# Patient Record
Sex: FEMALE | Race: BLACK OR AFRICAN AMERICAN | Employment: OTHER | URBAN - NONMETROPOLITAN AREA
[De-identification: names, ages, dates, MRNs, and addresses within clinical notes are randomized per-mention and may not be internally consistent; named-entity substitution may affect disease eponyms.]

---

## 2017-01-03 ENCOUNTER — TELEPHONE (OUTPATIENT)
Dept: FAMILY MEDICINE CLINIC | Age: 57
End: 2017-01-03

## 2017-01-03 DIAGNOSIS — M79.2 NERVE PAIN: Primary | ICD-10-CM

## 2017-01-03 RX ORDER — GABAPENTIN 100 MG/1
100 CAPSULE ORAL 2 TIMES DAILY
Qty: 60 CAPSULE | Refills: 3 | Status: SHIPPED | OUTPATIENT
Start: 2017-01-03 | End: 2017-07-06 | Stop reason: SDUPTHER

## 2017-04-03 RX ORDER — LORATADINE 10 MG/1
TABLET ORAL
Qty: 30 TABLET | Refills: 5 | Status: SHIPPED | OUTPATIENT
Start: 2017-04-03 | End: 2017-10-18 | Stop reason: SDUPTHER

## 2017-04-03 RX ORDER — LISINOPRIL AND HYDROCHLOROTHIAZIDE 12.5; 1 MG/1; MG/1
TABLET ORAL
Qty: 30 TABLET | Refills: 5 | Status: SHIPPED | OUTPATIENT
Start: 2017-04-03 | End: 2017-10-16 | Stop reason: SDUPTHER

## 2017-06-30 PROBLEM — R07.9 CHEST PAIN: Status: ACTIVE | Noted: 2017-06-30

## 2017-06-30 PROBLEM — J20.9 ACUTE BRONCHITIS: Status: ACTIVE | Noted: 2017-06-30

## 2017-06-30 PROBLEM — R05.9 COUGHING: Status: ACTIVE | Noted: 2017-06-30

## 2017-07-03 ENCOUNTER — TELEPHONE (OUTPATIENT)
Dept: FAMILY MEDICINE CLINIC | Age: 57
End: 2017-07-03

## 2017-07-06 ENCOUNTER — OFFICE VISIT (OUTPATIENT)
Dept: FAMILY MEDICINE CLINIC | Age: 57
End: 2017-07-06

## 2017-07-06 ENCOUNTER — TELEPHONE (OUTPATIENT)
Dept: FAMILY MEDICINE CLINIC | Age: 57
End: 2017-07-06

## 2017-07-06 VITALS
SYSTOLIC BLOOD PRESSURE: 126 MMHG | TEMPERATURE: 98 F | BODY MASS INDEX: 21.41 KG/M2 | DIASTOLIC BLOOD PRESSURE: 72 MMHG | WEIGHT: 113.4 LBS | HEART RATE: 80 BPM | HEIGHT: 61 IN | RESPIRATION RATE: 16 BRPM

## 2017-07-06 DIAGNOSIS — Z23 IMMUNIZATION DUE: ICD-10-CM

## 2017-07-06 DIAGNOSIS — J30.0 VASOMOTOR RHINITIS: ICD-10-CM

## 2017-07-06 DIAGNOSIS — Z13.9 SCREENING: ICD-10-CM

## 2017-07-06 DIAGNOSIS — I10 ESSENTIAL HYPERTENSION: ICD-10-CM

## 2017-07-06 DIAGNOSIS — M65.30 TRIGGER FINGER OF LEFT HAND, UNSPECIFIED FINGER: ICD-10-CM

## 2017-07-06 DIAGNOSIS — F10.10 ALCOHOL ABUSE: ICD-10-CM

## 2017-07-06 DIAGNOSIS — Z09 HOSPITAL DISCHARGE FOLLOW-UP: Primary | ICD-10-CM

## 2017-07-06 DIAGNOSIS — E78.5 HYPERLIPIDEMIA, UNSPECIFIED HYPERLIPIDEMIA TYPE: ICD-10-CM

## 2017-07-06 DIAGNOSIS — R05.9 COUGHING: ICD-10-CM

## 2017-07-06 PROCEDURE — 90471 IMMUNIZATION ADMIN: CPT | Performed by: NURSE PRACTITIONER

## 2017-07-06 PROCEDURE — 90732 PPSV23 VACC 2 YRS+ SUBQ/IM: CPT | Performed by: NURSE PRACTITIONER

## 2017-07-06 PROCEDURE — 99496 TRANSJ CARE MGMT HIGH F2F 7D: CPT | Performed by: NURSE PRACTITIONER

## 2017-07-06 RX ORDER — GABAPENTIN 100 MG/1
100 CAPSULE ORAL 2 TIMES DAILY
Qty: 60 CAPSULE | Refills: 3 | Status: SHIPPED | OUTPATIENT
Start: 2017-07-06 | End: 2017-11-15

## 2017-07-06 RX ORDER — AMLODIPINE BESYLATE 5 MG/1
5 TABLET ORAL DAILY
Qty: 30 TABLET | Refills: 6 | Status: SHIPPED | OUTPATIENT
Start: 2017-07-06 | End: 2017-11-15 | Stop reason: SDUPTHER

## 2017-07-06 RX ORDER — FLUTICASONE PROPIONATE 50 MCG
SPRAY, SUSPENSION (ML) NASAL
Qty: 1 BOTTLE | Refills: 5 | Status: SHIPPED | OUTPATIENT
Start: 2017-07-06 | End: 2017-11-15 | Stop reason: SDUPTHER

## 2017-07-06 RX ORDER — SIMVASTATIN 40 MG
40 TABLET ORAL NIGHTLY
Qty: 30 TABLET | Refills: 6 | Status: SHIPPED | OUTPATIENT
Start: 2017-07-06 | End: 2017-11-15 | Stop reason: SDUPTHER

## 2017-07-06 ASSESSMENT — ENCOUNTER SYMPTOMS
ABDOMINAL PAIN: 0
ABDOMINAL DISTENTION: 0
RESPIRATORY NEGATIVE: 1

## 2017-07-10 ENCOUNTER — TELEPHONE (OUTPATIENT)
Dept: FAMILY MEDICINE CLINIC | Age: 57
End: 2017-07-10

## 2017-07-12 ENCOUNTER — TELEPHONE (OUTPATIENT)
Dept: FAMILY MEDICINE CLINIC | Age: 57
End: 2017-07-12

## 2017-07-12 RX ORDER — THIAMINE MONONITRATE (VIT B1) 100 MG
100 TABLET ORAL DAILY
Qty: 30 TABLET | Refills: 11 | Status: SHIPPED | OUTPATIENT
Start: 2017-07-12 | End: 2018-06-10 | Stop reason: SDUPTHER

## 2017-07-14 ENCOUNTER — TELEPHONE (OUTPATIENT)
Dept: FAMILY MEDICINE CLINIC | Age: 57
End: 2017-07-14

## 2017-07-31 ENCOUNTER — TELEPHONE (OUTPATIENT)
Dept: FAMILY MEDICINE CLINIC | Age: 57
End: 2017-07-31

## 2017-10-16 RX ORDER — LISINOPRIL AND HYDROCHLOROTHIAZIDE 12.5; 1 MG/1; MG/1
TABLET ORAL
Qty: 30 TABLET | Refills: 5 | Status: SHIPPED | OUTPATIENT
Start: 2017-10-16 | End: 2018-04-10 | Stop reason: SDUPTHER

## 2017-10-16 NOTE — TELEPHONE ENCOUNTER
Danielle Pederson called requesting a refill on the following medications:  Requested Prescriptions     Pending Prescriptions Disp Refills    lisinopril-hydrochlorothiazide (PRINZIDE;ZESTORETIC) 10-12.5 MG per tablet 30 tablet 5     Sig: TAKE 1 [address] verified:  .pv      Date of last visit: 7/6/17  Date of next visit (if applicable): 33/31/1248        Date of last fill and quantity (to be completed by clinical staff)  Pharmacy name: [address]

## 2017-10-18 ENCOUNTER — OFFICE VISIT (OUTPATIENT)
Dept: FAMILY MEDICINE CLINIC | Age: 57
End: 2017-10-18
Payer: COMMERCIAL

## 2017-10-18 VITALS
WEIGHT: 114 LBS | SYSTOLIC BLOOD PRESSURE: 138 MMHG | BODY MASS INDEX: 20.98 KG/M2 | HEIGHT: 62 IN | HEART RATE: 68 BPM | RESPIRATION RATE: 12 BRPM | DIASTOLIC BLOOD PRESSURE: 76 MMHG | TEMPERATURE: 98.4 F

## 2017-10-18 DIAGNOSIS — Z01.419 PAP SMEAR, AS PART OF ROUTINE GYNECOLOGICAL EXAMINATION: Primary | ICD-10-CM

## 2017-10-18 DIAGNOSIS — K21.9 GASTROESOPHAGEAL REFLUX DISEASE WITHOUT ESOPHAGITIS: ICD-10-CM

## 2017-10-18 DIAGNOSIS — F34.1 DYSTHYMIA: ICD-10-CM

## 2017-10-18 DIAGNOSIS — J30.1 ALLERGIC RHINITIS DUE TO POLLEN, UNSPECIFIED CHRONICITY, UNSPECIFIED SEASONALITY: ICD-10-CM

## 2017-10-18 PROBLEM — R07.9 CHEST PAIN: Status: RESOLVED | Noted: 2017-06-30 | Resolved: 2017-10-18

## 2017-10-18 PROBLEM — J20.9 ACUTE BRONCHITIS: Status: RESOLVED | Noted: 2017-06-30 | Resolved: 2017-10-18

## 2017-10-18 PROCEDURE — 99396 PREV VISIT EST AGE 40-64: CPT | Performed by: NURSE PRACTITIONER

## 2017-10-18 RX ORDER — LORATADINE 10 MG/1
TABLET ORAL
Qty: 30 TABLET | Refills: 5 | Status: SHIPPED | OUTPATIENT
Start: 2017-10-18 | End: 2018-06-10 | Stop reason: SDUPTHER

## 2017-10-18 RX ORDER — PANTOPRAZOLE SODIUM 40 MG/1
TABLET, DELAYED RELEASE ORAL
Qty: 30 TABLET | Refills: 5 | Status: SHIPPED | OUTPATIENT
Start: 2017-10-18 | End: 2018-04-10 | Stop reason: SDUPTHER

## 2017-10-18 RX ORDER — SERTRALINE HYDROCHLORIDE 25 MG/1
25 TABLET, FILM COATED ORAL DAILY
Qty: 30 TABLET | Refills: 3 | Status: SHIPPED | OUTPATIENT
Start: 2017-10-18 | End: 2018-02-10 | Stop reason: SDUPTHER

## 2017-10-18 NOTE — PROGRESS NOTES
medications for this visit. Allergies: Pork-derived products   No LMP recorded. Patient is postmenopausal.    ROS:  Feeling well. No dyspnea or chest pain on exertion. No abdominal pain, change in bowel habits, black or bloody stools. No urinary tract symptoms. GYN ROS: no breast pain or new or enlarging lumps on self exam, no vaginal bleeding, no discharge or pelvic pain, no hot flashes. No neurological complaints. OBJECTIVE:   The patient appears well, alert, oriented x 3, in no distress. /76   Pulse 68   Temp 98.4 °F (36.9 °C) (Oral)   Resp 12   Ht 5' 2.21\" (1.58 m)   Wt 114 lb (51.7 kg)   BMI 20.71 kg/m²   ENT normal.  Neck supple. No adenopathy or thyromegaly. TIMI. Lungs are clear, good air entry, no wheezes, rhonchi or rales. S1 and S2 normal, no murmurs, regular rate and rhythm. Abdomen soft without tenderness, guarding, mass or organomegaly. Extremities show no edema, normal peripheral pulses. Neurological is normal, no focal findings. BREAST EXAM: breasts appear normal, no suspicious masses, no skin or nipple changes or axillary nodes, no axillary lymphadenopathy, risk and benefit of breast self-exam was discussed    PELVIC EXAM: normal external genitalia, vulva, vagina, cervix, uterus and adnexa, VULVA: normal appearing vulva with no masses, tenderness or lesions, VAGINA: normal appearing vagina with normal color and discharge, no lesions, PELVIC FLOOR EXAM: no cystocele, rectocele or prolapse noted, CERVIX: normal appearing cervix without discharge or lesions, no discharge noted, cervical motion tenderness absent, UTERUS: uterus is normal size, shape, consistency and nontender, ADNEXA: normal adnexa in size, nontender and no masses, no masses, RECTAL: normal rectal, no masses, guaiac negative stool obtained, normal sphincter tone, no rectocele, PAP: Pap smear done today, thin-prep method    ASSESSMENT:   well woman  Encounter Diagnoses   Name Primary?     Pap smear, as part of routine gynecological examination Yes    Gastroesophageal reflux disease without esophagitis     Allergic rhinitis due to pollen, unspecified chronicity, unspecified seasonality     Dysthymia      PLAN:   mammogram  pap smear  counseled on breast self exam, mammography screening, menopause, osteoporosis and adequate intake of calcium and vitamin D  return annually or prn  Orders Placed This Encounter   Procedures    PAP SMEAR   Start zoloft and monitor symptoms.

## 2017-10-18 NOTE — PATIENT INSTRUCTIONS
and stroke risk. At least every 4 to 6 years, you should have your risk for heart attack and stroke assessed. Your doctor uses factors such as your age, blood pressure, cholesterol, and whether you smoke or have diabetes to show what your risk for a heart attack or stroke is over the next 10 years. When should you call for help? Watch closely for changes in your health, and be sure to contact your doctor if you have any problems or symptoms that concern you. Where can you learn more? Go to https://Local.com.[employer]. org and sign in to your [employer] account. Enter F975 in the [employer]Tel.com box to learn more about \"Well Visit, Women 50 to 72: Care Instructions. \"     If you do not have an account, please click on the \"Sign Up Now\" link. Current as of: July 19, 2016  Content Version: 11.3  © 8564-4082 [employer]. Care instructions adapted under license by [facility] ([place] [facility]). If you have questions about a medical condition or this instruction, always ask your healthcare professional. [address] any warranty or liability for your use of this information.

## 2017-10-24 LAB — GYNECOLOGY CYTOLOGY REPORT: NORMAL

## 2017-10-26 ENCOUNTER — TELEPHONE (OUTPATIENT)
Dept: FAMILY MEDICINE CLINIC | Age: 57
End: 2017-10-26

## 2017-11-15 ENCOUNTER — OFFICE VISIT (OUTPATIENT)
Dept: FAMILY MEDICINE CLINIC | Age: 57
End: 2017-11-15
Payer: COMMERCIAL

## 2017-11-15 VITALS
HEIGHT: 62 IN | DIASTOLIC BLOOD PRESSURE: 76 MMHG | RESPIRATION RATE: 16 BRPM | TEMPERATURE: 97.9 F | SYSTOLIC BLOOD PRESSURE: 124 MMHG | HEART RATE: 88 BPM | WEIGHT: 117.2 LBS | BODY MASS INDEX: 21.57 KG/M2

## 2017-11-15 DIAGNOSIS — E78.5 HYPERLIPIDEMIA, UNSPECIFIED HYPERLIPIDEMIA TYPE: ICD-10-CM

## 2017-11-15 DIAGNOSIS — J30.0 CHRONIC VASOMOTOR RHINITIS: ICD-10-CM

## 2017-11-15 DIAGNOSIS — K21.9 GASTROESOPHAGEAL REFLUX DISEASE WITHOUT ESOPHAGITIS: ICD-10-CM

## 2017-11-15 DIAGNOSIS — Z86.010 HISTORY OF COLON POLYPS: ICD-10-CM

## 2017-11-15 DIAGNOSIS — I10 ESSENTIAL HYPERTENSION: Primary | ICD-10-CM

## 2017-11-15 DIAGNOSIS — F41.9 ANXIETY: ICD-10-CM

## 2017-11-15 PROCEDURE — 3017F COLORECTAL CA SCREEN DOC REV: CPT | Performed by: NURSE PRACTITIONER

## 2017-11-15 PROCEDURE — 4004F PT TOBACCO SCREEN RCVD TLK: CPT | Performed by: NURSE PRACTITIONER

## 2017-11-15 PROCEDURE — 3014F SCREEN MAMMO DOC REV: CPT | Performed by: NURSE PRACTITIONER

## 2017-11-15 PROCEDURE — G8482 FLU IMMUNIZE ORDER/ADMIN: HCPCS | Performed by: NURSE PRACTITIONER

## 2017-11-15 PROCEDURE — G8427 DOCREV CUR MEDS BY ELIG CLIN: HCPCS | Performed by: NURSE PRACTITIONER

## 2017-11-15 PROCEDURE — 99214 OFFICE O/P EST MOD 30 MIN: CPT | Performed by: NURSE PRACTITIONER

## 2017-11-15 PROCEDURE — G8420 CALC BMI NORM PARAMETERS: HCPCS | Performed by: NURSE PRACTITIONER

## 2017-11-15 RX ORDER — SIMVASTATIN 40 MG
40 TABLET ORAL NIGHTLY
Qty: 30 TABLET | Refills: 6 | Status: SHIPPED | OUTPATIENT
Start: 2017-11-15 | End: 2018-11-02 | Stop reason: SDUPTHER

## 2017-11-15 RX ORDER — FLUTICASONE PROPIONATE 50 MCG
SPRAY, SUSPENSION (ML) NASAL
Qty: 1 BOTTLE | Refills: 5 | Status: SHIPPED | OUTPATIENT
Start: 2017-11-15 | End: 2018-09-08 | Stop reason: SDUPTHER

## 2017-11-15 RX ORDER — AMLODIPINE BESYLATE 5 MG/1
5 TABLET ORAL DAILY
Qty: 30 TABLET | Refills: 6 | Status: SHIPPED | OUTPATIENT
Start: 2017-11-15 | End: 2018-12-05 | Stop reason: SDUPTHER

## 2017-11-15 RX ORDER — ASPIRIN 81 MG/1
81 TABLET ORAL DAILY
Qty: 30 TABLET | Refills: 11 | Status: SHIPPED | OUTPATIENT
Start: 2017-11-15 | End: 2018-11-05 | Stop reason: SDUPTHER

## 2017-11-15 ASSESSMENT — ENCOUNTER SYMPTOMS
GASTROINTESTINAL NEGATIVE: 1
RESPIRATORY NEGATIVE: 1
SINUS PAIN: 0
SINUS PRESSURE: 0
RHINORRHEA: 1

## 2017-11-15 ASSESSMENT — PATIENT HEALTH QUESTIONNAIRE - PHQ9
SUM OF ALL RESPONSES TO PHQ QUESTIONS 1-9: 0
SUM OF ALL RESPONSES TO PHQ9 QUESTIONS 1 & 2: 0
2. FEELING DOWN, DEPRESSED OR HOPELESS: 0
1. LITTLE INTEREST OR PLEASURE IN DOING THINGS: 0

## 2018-01-30 ENCOUNTER — HOSPITAL ENCOUNTER (EMERGENCY)
Age: 58
Discharge: HOME | End: 2018-01-30
Attending: FAMILY MEDICINE
Payer: COMMERCIAL

## 2018-01-30 VITALS
DIASTOLIC BLOOD PRESSURE: 73 MMHG | SYSTOLIC BLOOD PRESSURE: 94 MMHG | TEMPERATURE: 98.6 F | HEIGHT: 61 IN | OXYGEN SATURATION: 95 % | BODY MASS INDEX: 21.52 KG/M2 | WEIGHT: 114 LBS | RESPIRATION RATE: 18 BRPM | HEART RATE: 82 BPM

## 2018-01-30 DIAGNOSIS — R21 RASH AND OTHER NONSPECIFIC SKIN ERUPTION: Primary | ICD-10-CM

## 2018-01-30 PROCEDURE — 99282 EMERGENCY DEPT VISIT SF MDM: CPT

## 2018-01-30 RX ORDER — DIAPER,BRIEF,INFANT-TODD,DISP
EACH MISCELLANEOUS
Qty: 1 TUBE | Refills: 0 | Status: SHIPPED | OUTPATIENT
Start: 2018-01-30 | End: 2018-01-30

## 2018-01-30 RX ORDER — DIAPER,BRIEF,INFANT-TODD,DISP
EACH MISCELLANEOUS
Qty: 1 TUBE | Refills: 0 | Status: SHIPPED | OUTPATIENT
Start: 2018-01-30 | End: 2018-02-06

## 2018-01-30 ASSESSMENT — ENCOUNTER SYMPTOMS
NAUSEA: 0
ABDOMINAL DISTENTION: 0
DIARRHEA: 0
EYE DISCHARGE: 0
SHORTNESS OF BREATH: 0
EYE REDNESS: 0
PHOTOPHOBIA: 0
COUGH: 0
RHINORRHEA: 0
STRIDOR: 0
FACIAL SWELLING: 0
COLOR CHANGE: 0
VOMITING: 0

## 2018-02-10 DIAGNOSIS — F34.1 DYSTHYMIA: ICD-10-CM

## 2018-02-12 RX ORDER — SERTRALINE HYDROCHLORIDE 25 MG/1
25 TABLET, FILM COATED ORAL DAILY
Qty: 30 TABLET | Refills: 3 | Status: SHIPPED | OUTPATIENT
Start: 2018-02-12 | End: 2018-06-11 | Stop reason: SDUPTHER

## 2018-04-10 DIAGNOSIS — K21.9 GASTROESOPHAGEAL REFLUX DISEASE WITHOUT ESOPHAGITIS: ICD-10-CM

## 2018-04-10 RX ORDER — LISINOPRIL AND HYDROCHLOROTHIAZIDE 12.5; 1 MG/1; MG/1
TABLET ORAL
Qty: 30 TABLET | Refills: 5 | Status: SHIPPED | OUTPATIENT
Start: 2018-04-10 | End: 2018-10-08 | Stop reason: SDUPTHER

## 2018-04-10 RX ORDER — PANTOPRAZOLE SODIUM 40 MG/1
TABLET, DELAYED RELEASE ORAL
Qty: 30 TABLET | Refills: 5 | Status: SHIPPED | OUTPATIENT
Start: 2018-04-10 | End: 2018-10-08 | Stop reason: SDUPTHER

## 2018-05-07 ENCOUNTER — HOSPITAL ENCOUNTER (EMERGENCY)
Age: 58
Discharge: HOME | End: 2018-05-07
Payer: COMMERCIAL

## 2018-05-07 ENCOUNTER — APPOINTMENT (OUTPATIENT)
Dept: GENERAL RADIOLOGY | Age: 58
End: 2018-05-07
Payer: COMMERCIAL

## 2018-05-07 VITALS
OXYGEN SATURATION: 97 % | HEIGHT: 61 IN | WEIGHT: 114 LBS | SYSTOLIC BLOOD PRESSURE: 133 MMHG | BODY MASS INDEX: 21.52 KG/M2 | RESPIRATION RATE: 18 BRPM | DIASTOLIC BLOOD PRESSURE: 89 MMHG | HEART RATE: 83 BPM | TEMPERATURE: 98.1 F

## 2018-05-07 DIAGNOSIS — N30.00 ACUTE CYSTITIS WITHOUT HEMATURIA: Primary | ICD-10-CM

## 2018-05-07 DIAGNOSIS — J06.9 VIRAL URI WITH COUGH: ICD-10-CM

## 2018-05-07 LAB
BACTERIA: ABNORMAL /HPF
BILIRUBIN URINE: NEGATIVE
BLOOD, URINE: NEGATIVE
CASTS 2: ABNORMAL /LPF
CASTS UA: ABNORMAL /LPF
CHARACTER, URINE: ABNORMAL
COLOR: YELLOW
CRYSTALS, UA: ABNORMAL
EPITHELIAL CELLS, UA: ABNORMAL /HPF
FLU A ANTIGEN: NEGATIVE
FLU B ANTIGEN: NEGATIVE
GLUCOSE URINE: NEGATIVE MG/DL
KETONES, URINE: NEGATIVE
LEUKOCYTE ESTERASE, URINE: ABNORMAL
MISCELLANEOUS 2: ABNORMAL
NITRITE, URINE: NEGATIVE
PH UA: 5.5
PROTEIN UA: NEGATIVE
RBC URINE: ABNORMAL /HPF
RENAL EPITHELIAL, UA: ABNORMAL
SPECIFIC GRAVITY, URINE: 1.01 (ref 1–1.03)
UROBILINOGEN, URINE: 1 EU/DL
WBC UA: ABNORMAL /HPF
YEAST: ABNORMAL

## 2018-05-07 PROCEDURE — 6360000002 HC RX W HCPCS: Performed by: STUDENT IN AN ORGANIZED HEALTH CARE EDUCATION/TRAINING PROGRAM

## 2018-05-07 PROCEDURE — 71046 X-RAY EXAM CHEST 2 VIEWS: CPT

## 2018-05-07 PROCEDURE — 87086 URINE CULTURE/COLONY COUNT: CPT

## 2018-05-07 PROCEDURE — 6370000000 HC RX 637 (ALT 250 FOR IP): Performed by: STUDENT IN AN ORGANIZED HEALTH CARE EDUCATION/TRAINING PROGRAM

## 2018-05-07 PROCEDURE — 87804 INFLUENZA ASSAY W/OPTIC: CPT

## 2018-05-07 PROCEDURE — 99283 EMERGENCY DEPT VISIT LOW MDM: CPT

## 2018-05-07 PROCEDURE — 94640 AIRWAY INHALATION TREATMENT: CPT

## 2018-05-07 PROCEDURE — 81001 URINALYSIS AUTO W/SCOPE: CPT

## 2018-05-07 RX ORDER — IPRATROPIUM BROMIDE AND ALBUTEROL SULFATE 2.5; .5 MG/3ML; MG/3ML
1 SOLUTION RESPIRATORY (INHALATION) ONCE
Status: COMPLETED | OUTPATIENT
Start: 2018-05-07 | End: 2018-05-07

## 2018-05-07 RX ORDER — ONDANSETRON 4 MG/1
4 TABLET, ORALLY DISINTEGRATING ORAL ONCE
Status: COMPLETED | OUTPATIENT
Start: 2018-05-07 | End: 2018-05-07

## 2018-05-07 RX ORDER — ONDANSETRON 4 MG/1
4 TABLET, ORALLY DISINTEGRATING ORAL EVERY 8 HOURS PRN
Qty: 20 TABLET | Refills: 0 | Status: SHIPPED | OUTPATIENT
Start: 2018-05-07 | End: 2019-01-15 | Stop reason: ALTCHOICE

## 2018-05-07 RX ORDER — ALBUTEROL SULFATE 90 UG/1
2 AEROSOL, METERED RESPIRATORY (INHALATION) EVERY 4 HOURS PRN
Qty: 1 INHALER | Refills: 0 | Status: SHIPPED | OUTPATIENT
Start: 2018-05-07

## 2018-05-07 RX ORDER — NITROFURANTOIN 25; 75 MG/1; MG/1
100 CAPSULE ORAL 2 TIMES DAILY
Qty: 14 CAPSULE | Refills: 0 | Status: SHIPPED | OUTPATIENT
Start: 2018-05-07 | End: 2018-05-14

## 2018-05-07 RX ADMIN — IPRATROPIUM BROMIDE AND ALBUTEROL SULFATE 1 AMPULE: .5; 3 SOLUTION RESPIRATORY (INHALATION) at 16:26

## 2018-05-07 RX ADMIN — ONDANSETRON 4 MG: 4 TABLET, ORALLY DISINTEGRATING ORAL at 16:25

## 2018-05-07 ASSESSMENT — ENCOUNTER SYMPTOMS
EYE PAIN: 0
ABDOMINAL PAIN: 0
EYE DISCHARGE: 0
COUGH: 1
SHORTNESS OF BREATH: 0
VOMITING: 0
DIARRHEA: 1
BACK PAIN: 1
WHEEZING: 0
SORE THROAT: 0
NAUSEA: 1
RHINORRHEA: 1

## 2018-05-08 LAB
ORGANISM: ABNORMAL
URINE CULTURE REFLEX: ABNORMAL

## 2018-05-09 ENCOUNTER — CARE COORDINATION (OUTPATIENT)
Dept: CASE MANAGEMENT | Age: 58
End: 2018-05-09

## 2018-05-15 ENCOUNTER — OFFICE VISIT (OUTPATIENT)
Dept: FAMILY MEDICINE CLINIC | Age: 58
End: 2018-05-15
Payer: COMMERCIAL

## 2018-05-15 VITALS
SYSTOLIC BLOOD PRESSURE: 138 MMHG | WEIGHT: 112.8 LBS | TEMPERATURE: 98.7 F | HEIGHT: 61 IN | DIASTOLIC BLOOD PRESSURE: 82 MMHG | HEART RATE: 80 BPM | RESPIRATION RATE: 16 BRPM | BODY MASS INDEX: 21.3 KG/M2

## 2018-05-15 DIAGNOSIS — Z72.0 TOBACCO ABUSE: ICD-10-CM

## 2018-05-15 DIAGNOSIS — J40 BRONCHITIS: Primary | ICD-10-CM

## 2018-05-15 DIAGNOSIS — R09.89 HYPERINFLATION OF LUNGS: ICD-10-CM

## 2018-05-15 PROCEDURE — 99213 OFFICE O/P EST LOW 20 MIN: CPT | Performed by: NURSE PRACTITIONER

## 2018-05-15 PROCEDURE — 3017F COLORECTAL CA SCREEN DOC REV: CPT | Performed by: NURSE PRACTITIONER

## 2018-05-15 PROCEDURE — G8427 DOCREV CUR MEDS BY ELIG CLIN: HCPCS | Performed by: NURSE PRACTITIONER

## 2018-05-15 PROCEDURE — G8420 CALC BMI NORM PARAMETERS: HCPCS | Performed by: NURSE PRACTITIONER

## 2018-05-15 PROCEDURE — 4004F PT TOBACCO SCREEN RCVD TLK: CPT | Performed by: NURSE PRACTITIONER

## 2018-05-15 RX ORDER — GUAIFENESIN 600 MG/1
600 TABLET, EXTENDED RELEASE ORAL 2 TIMES DAILY
Qty: 30 TABLET | Refills: 0 | Status: SHIPPED | OUTPATIENT
Start: 2018-05-15 | End: 2019-01-15 | Stop reason: ALTCHOICE

## 2018-05-15 ASSESSMENT — ENCOUNTER SYMPTOMS
CHOKING: 0
CHEST TIGHTNESS: 0
COUGH: 1
SINUS PRESSURE: 0
SHORTNESS OF BREATH: 0
SINUS PAIN: 0
RHINORRHEA: 0
WHEEZING: 0

## 2018-05-30 ENCOUNTER — HOSPITAL ENCOUNTER (OUTPATIENT)
Dept: PULMONOLOGY | Age: 58
Discharge: HOME | End: 2018-05-30
Payer: COMMERCIAL

## 2018-05-30 ENCOUNTER — TELEPHONE (OUTPATIENT)
Dept: FAMILY MEDICINE CLINIC | Age: 58
End: 2018-05-30

## 2018-05-30 DIAGNOSIS — R09.89 HYPERINFLATION OF LUNGS: ICD-10-CM

## 2018-05-30 DIAGNOSIS — J40 BRONCHITIS: ICD-10-CM

## 2018-05-30 DIAGNOSIS — Z72.0 TOBACCO ABUSE: ICD-10-CM

## 2018-05-30 PROCEDURE — 94726 PLETHYSMOGRAPHY LUNG VOLUMES: CPT

## 2018-05-30 PROCEDURE — 94729 DIFFUSING CAPACITY: CPT

## 2018-05-30 PROCEDURE — 94060 EVALUATION OF WHEEZING: CPT

## 2018-06-04 ENCOUNTER — TELEPHONE (OUTPATIENT)
Dept: FAMILY MEDICINE CLINIC | Age: 58
End: 2018-06-04

## 2018-06-06 ENCOUNTER — TELEPHONE (OUTPATIENT)
Dept: FAMILY MEDICINE CLINIC | Age: 58
End: 2018-06-06

## 2018-06-10 DIAGNOSIS — J30.1 ALLERGIC RHINITIS DUE TO POLLEN, UNSPECIFIED CHRONICITY, UNSPECIFIED SEASONALITY: ICD-10-CM

## 2018-06-11 DIAGNOSIS — F34.1 DYSTHYMIA: ICD-10-CM

## 2018-06-11 RX ORDER — DEXTRIN 3 G/3.8 G
100 POWDER (GRAM) ORAL DAILY
Qty: 30 TABLET | Refills: 10 | Status: SHIPPED | OUTPATIENT
Start: 2018-06-11 | End: 2019-06-12 | Stop reason: SDUPTHER

## 2018-06-11 RX ORDER — LORATADINE 10 MG/1
TABLET ORAL
Qty: 30 TABLET | Refills: 5 | Status: SHIPPED | OUTPATIENT
Start: 2018-06-11 | End: 2018-12-04 | Stop reason: SDUPTHER

## 2018-06-11 RX ORDER — SERTRALINE HYDROCHLORIDE 25 MG/1
25 TABLET, FILM COATED ORAL DAILY
Qty: 30 TABLET | Refills: 3 | Status: SHIPPED | OUTPATIENT
Start: 2018-06-11 | End: 2018-10-06 | Stop reason: SDUPTHER

## 2018-07-02 ENCOUNTER — TELEPHONE (OUTPATIENT)
Dept: FAMILY MEDICINE CLINIC | Age: 58
End: 2018-07-02

## 2018-07-02 NOTE — TELEPHONE ENCOUNTER
Informed pt that we received an approval on 6/11/18 for the inhaler. She said she was going to contact her pharmacy about this, and if there are any issues to contact our office. Pt verbalized understanding.

## 2018-07-02 NOTE — TELEPHONE ENCOUNTER
7/2/18  Patient checking status on PA for her inhaler for bronchitis (didn't know name), as hasn't heard anything back. States [facility] [address] told her yesterday that they had something they could use, but was check with [facility]. She is asking for \"something\" to use. Please advise.   Thanks/blm  Dolv: 5/15/18      (Can be reached anytime)

## 2018-09-08 DIAGNOSIS — J30.0 CHRONIC VASOMOTOR RHINITIS: ICD-10-CM

## 2018-09-10 RX ORDER — FLUTICASONE PROPIONATE 50 MCG
SPRAY, SUSPENSION (ML) NASAL
Qty: 1 BOTTLE | Refills: 5 | Status: SHIPPED | OUTPATIENT
Start: 2018-09-10 | End: 2019-03-06 | Stop reason: SDUPTHER

## 2018-10-06 DIAGNOSIS — F34.1 DYSTHYMIA: ICD-10-CM

## 2018-10-08 DIAGNOSIS — K21.9 GASTROESOPHAGEAL REFLUX DISEASE WITHOUT ESOPHAGITIS: ICD-10-CM

## 2018-10-08 RX ORDER — SERTRALINE HYDROCHLORIDE 25 MG/1
25 TABLET, FILM COATED ORAL DAILY
Qty: 30 TABLET | Refills: 3 | Status: SHIPPED | OUTPATIENT
Start: 2018-10-08 | End: 2019-01-15 | Stop reason: SDUPTHER

## 2018-10-08 RX ORDER — LISINOPRIL AND HYDROCHLOROTHIAZIDE 12.5; 1 MG/1; MG/1
TABLET ORAL
Qty: 30 TABLET | Refills: 5 | Status: SHIPPED | OUTPATIENT
Start: 2018-10-08 | End: 2019-04-15 | Stop reason: SDUPTHER

## 2018-10-08 RX ORDER — PANTOPRAZOLE SODIUM 40 MG/1
TABLET, DELAYED RELEASE ORAL
Qty: 30 TABLET | Refills: 5 | Status: SHIPPED | OUTPATIENT
Start: 2018-10-08 | End: 2020-01-10 | Stop reason: SDUPTHER

## 2018-11-02 DIAGNOSIS — E78.5 HYPERLIPIDEMIA, UNSPECIFIED HYPERLIPIDEMIA TYPE: ICD-10-CM

## 2018-11-02 RX ORDER — SIMVASTATIN 40 MG
40 TABLET ORAL NIGHTLY
Qty: 30 TABLET | Refills: 6 | Status: SHIPPED | OUTPATIENT
Start: 2018-11-02 | End: 2019-06-12 | Stop reason: SDUPTHER

## 2018-11-05 DIAGNOSIS — I10 ESSENTIAL HYPERTENSION: ICD-10-CM

## 2018-11-05 RX ORDER — ASPIRIN 81 MG/1
81 TABLET ORAL DAILY
Qty: 30 TABLET | Refills: 11 | Status: SHIPPED | OUTPATIENT
Start: 2018-11-05 | End: 2019-01-15 | Stop reason: ALTCHOICE

## 2018-12-04 DIAGNOSIS — J30.1 ALLERGIC RHINITIS DUE TO POLLEN: ICD-10-CM

## 2018-12-04 RX ORDER — LORATADINE 10 MG/1
TABLET ORAL
Qty: 30 TABLET | Refills: 5 | Status: SHIPPED | OUTPATIENT
Start: 2018-12-04 | End: 2019-07-20 | Stop reason: SDUPTHER

## 2018-12-05 DIAGNOSIS — I10 ESSENTIAL HYPERTENSION: ICD-10-CM

## 2018-12-05 RX ORDER — AMLODIPINE BESYLATE 5 MG/1
5 TABLET ORAL DAILY
Qty: 30 TABLET | Refills: 4 | Status: SHIPPED | OUTPATIENT
Start: 2018-12-05 | End: 2019-05-10 | Stop reason: SDUPTHER

## 2018-12-17 ENCOUNTER — APPOINTMENT (OUTPATIENT)
Dept: GENERAL RADIOLOGY | Age: 58
End: 2018-12-17
Payer: COMMERCIAL

## 2018-12-17 ENCOUNTER — HOSPITAL ENCOUNTER (EMERGENCY)
Age: 58
Discharge: HOME | End: 2018-12-17
Payer: COMMERCIAL

## 2018-12-17 VITALS
OXYGEN SATURATION: 98 % | SYSTOLIC BLOOD PRESSURE: 112 MMHG | HEART RATE: 76 BPM | TEMPERATURE: 97.8 F | RESPIRATION RATE: 17 BRPM | DIASTOLIC BLOOD PRESSURE: 87 MMHG

## 2018-12-17 DIAGNOSIS — R07.9 CHEST PAIN, UNSPECIFIED TYPE: Primary | ICD-10-CM

## 2018-12-17 DIAGNOSIS — F43.21 GRIEF: ICD-10-CM

## 2018-12-17 DIAGNOSIS — F10.920 ACUTE ALCOHOLIC INTOXICATION WITHOUT COMPLICATION (HCC): ICD-10-CM

## 2018-12-17 LAB
ANION GAP SERPL CALCULATED.3IONS-SCNC: 18 MEQ/L (ref 8–16)
BASOPHILS # BLD: 0.4 %
BASOPHILS ABSOLUTE: 0 THOU/MM3 (ref 0–0.1)
BUN BLDV-MCNC: 9 MG/DL (ref 7–22)
CALCIUM SERPL-MCNC: 8.8 MG/DL (ref 8.5–10.5)
CHLORIDE BLD-SCNC: 100 MEQ/L (ref 98–111)
CO2: 23 MEQ/L (ref 23–33)
CREAT SERPL-MCNC: 1.1 MG/DL (ref 0.4–1.2)
D-DIMER QUANTITATIVE: < 215 NG/ML FEU (ref 0–500)
EKG ATRIAL RATE: 89 BPM
EKG P AXIS: 72 DEGREES
EKG P-R INTERVAL: 144 MS
EKG Q-T INTERVAL: 352 MS
EKG QRS DURATION: 78 MS
EKG QTC CALCULATION (BAZETT): 428 MS
EKG R AXIS: 57 DEGREES
EKG T AXIS: 40 DEGREES
EKG VENTRICULAR RATE: 89 BPM
EOSINOPHIL # BLD: 1.3 %
EOSINOPHILS ABSOLUTE: 0.1 THOU/MM3 (ref 0–0.4)
ERYTHROCYTE [DISTWIDTH] IN BLOOD BY AUTOMATED COUNT: 12.6 % (ref 11.5–14.5)
ERYTHROCYTE [DISTWIDTH] IN BLOOD BY AUTOMATED COUNT: 44.4 FL (ref 35–45)
ETHYL ALCOHOL, SERUM: 0.22 %
GFR SERPL CREATININE-BSD FRML MDRD: 62 ML/MIN/1.73M2
GLUCOSE BLD-MCNC: 118 MG/DL (ref 70–108)
HCT VFR BLD CALC: 41 % (ref 37–47)
HEMOGLOBIN: 14.2 GM/DL (ref 12–16)
IMMATURE GRANS (ABS): 0.01 THOU/MM3 (ref 0–0.07)
IMMATURE GRANULOCYTES: 0.1 %
LIPASE: 20.6 U/L (ref 5.6–51.3)
LYMPHOCYTES # BLD: 30.1 %
LYMPHOCYTES ABSOLUTE: 2.1 THOU/MM3 (ref 1–4.8)
MCH RBC QN AUTO: 33.7 PG (ref 26–33)
MCHC RBC AUTO-ENTMCNC: 34.6 GM/DL (ref 32.2–35.5)
MCV RBC AUTO: 97.4 FL (ref 81–99)
MONOCYTES # BLD: 10.7 %
MONOCYTES ABSOLUTE: 0.7 THOU/MM3 (ref 0.4–1.3)
NUCLEATED RED BLOOD CELLS: 0 /100 WBC
OSMOLALITY CALCULATION: 281 MOSMOL/KG (ref 275–300)
PLATELET # BLD: 307 THOU/MM3 (ref 130–400)
PMV BLD AUTO: 9.6 FL (ref 9.4–12.4)
POTASSIUM SERPL-SCNC: 3.9 MEQ/L (ref 3.5–5.2)
RBC # BLD: 4.21 MILL/MM3 (ref 4.2–5.4)
SEG NEUTROPHILS: 57.4 %
SEGMENTED NEUTROPHILS ABSOLUTE COUNT: 4 THOU/MM3 (ref 1.8–7.7)
SODIUM BLD-SCNC: 141 MEQ/L (ref 135–145)
TROPONIN T: < 0.01 NG/ML
TROPONIN T: < 0.01 NG/ML
TSH SERPL DL<=0.05 MIU/L-ACNC: 1.03 UIU/ML (ref 0.4–4.2)
WBC # BLD: 6.9 THOU/MM3 (ref 4.8–10.8)

## 2018-12-17 PROCEDURE — 83690 ASSAY OF LIPASE: CPT

## 2018-12-17 PROCEDURE — 84484 ASSAY OF TROPONIN QUANT: CPT

## 2018-12-17 PROCEDURE — 71046 X-RAY EXAM CHEST 2 VIEWS: CPT

## 2018-12-17 PROCEDURE — 99285 EMERGENCY DEPT VISIT HI MDM: CPT

## 2018-12-17 PROCEDURE — 85025 COMPLETE CBC W/AUTO DIFF WBC: CPT

## 2018-12-17 PROCEDURE — 93010 ELECTROCARDIOGRAM REPORT: CPT | Performed by: INTERNAL MEDICINE

## 2018-12-17 PROCEDURE — 93005 ELECTROCARDIOGRAM TRACING: CPT | Performed by: NURSE PRACTITIONER

## 2018-12-17 PROCEDURE — 80048 BASIC METABOLIC PNL TOTAL CA: CPT

## 2018-12-17 PROCEDURE — 85379 FIBRIN DEGRADATION QUANT: CPT

## 2018-12-17 PROCEDURE — G0480 DRUG TEST DEF 1-7 CLASSES: HCPCS

## 2018-12-17 PROCEDURE — 36415 COLL VENOUS BLD VENIPUNCTURE: CPT

## 2018-12-17 PROCEDURE — 84443 ASSAY THYROID STIM HORMONE: CPT

## 2018-12-17 ASSESSMENT — PAIN DESCRIPTION - PAIN TYPE: TYPE: ACUTE PAIN

## 2018-12-17 ASSESSMENT — ENCOUNTER SYMPTOMS
COUGH: 0
PHOTOPHOBIA: 0
DIARRHEA: 0
EYE REDNESS: 0
SORE THROAT: 0
CHEST TIGHTNESS: 0
VOICE CHANGE: 0
SHORTNESS OF BREATH: 1
COLOR CHANGE: 0
RHINORRHEA: 0
CONSTIPATION: 0
ABDOMINAL DISTENTION: 0
ABDOMINAL PAIN: 0
VOMITING: 0
BACK PAIN: 0
WHEEZING: 0
SINUS PRESSURE: 0
NAUSEA: 0
BLOOD IN STOOL: 0

## 2018-12-17 ASSESSMENT — PAIN DESCRIPTION - LOCATION: LOCATION: CHEST

## 2018-12-17 ASSESSMENT — PAIN SCALES - GENERAL: PAINLEVEL_OUTOF10: 8

## 2018-12-17 NOTE — ED TRIAGE NOTES
Patient presents from her grandsons  with chest pain that started while she was there at the . EKG completed showing NSR.

## 2018-12-17 NOTE — ED PROVIDER NOTES
[facility] Emergency Department    CHIEF COMPLAINT       Chief Complaint   Patient presents with    Chest Pain    Anxiety       Nurses Notes reviewed and I agree except as noted in the HPI. HISTORY OF PRESENT ILLNESS    Marge Sheehan is a 61 y.o. female who presents to the ED for evaluation of chest pain and anxiety. Patient reports a history of hypertension, hyperlipidemia, anxiety, DVT, and a clotting disorder for which she is currently not on any blood thinner. She states that her grandson  6 days ago and that she has been experiencing increased stress and anxiety in regards to her loss. She reports that she has been experiencing right-sided to mid-sternal chest pain for the past 2 days which worsened today while she was at her grandson's . She rates her current pain at an 8/10 in severity and describes the pain as a continuous pressure. Patient reports that she has also been experiencing fatigue, shortness of breath, and intermittent dizziness. She states that she smokes and that she has a family history of cardiac disease. She denies experiencing any nausea, emesis, diarrhea, abdominal pain, back pain, fever, chills, urinary symptoms, or URI symptoms. Patient denies further complaints at initial encounter. Pain description:  Onset: 2 days ago, worse today  Location: Chest pain  Duration: Continuous  Character: Pressure  Aggravating factors: None  Radiation: None  Severity: 8/10    Experienced previously: No    HPI was provided by the patient. REVIEW OF SYSTEMS     Review of Systems   Constitutional: Positive for fatigue. Negative for appetite change, chills, diaphoresis, fever and unexpected weight change. HENT: Negative for congestion, hearing loss, postnasal drip, rhinorrhea, sinus pressure, sore throat and voice change. Eyes: Negative for photophobia, redness and visual disturbance. Respiratory: Positive for shortness of breath.  Negative for cough, chest tightness and wheezing. Cardiovascular: Positive for chest pain. Negative for palpitations. Gastrointestinal: Negative for abdominal distention, abdominal pain, blood in stool, constipation, diarrhea, nausea and vomiting. Endocrine: Negative for cold intolerance, heat intolerance, polydipsia, polyphagia and polyuria. Genitourinary: Negative for difficulty urinating, dysuria, flank pain, frequency and vaginal pain. Musculoskeletal: Negative for arthralgias, back pain, gait problem, joint swelling, neck pain and neck stiffness. Skin: Negative for color change and rash. Allergic/Immunologic: Negative for immunocompromised state. Neurological: Positive for dizziness. Negative for tremors, weakness, light-headedness, numbness and headaches. Hematological: Does not bruise/bleed easily. Psychiatric/Behavioral: Negative for behavioral problems, confusion, decreased concentration, hallucinations, self-injury and suicidal ideas. The patient is nervous/anxious. PAST MEDICAL HISTORY     Past Medical History:   Diagnosis Date    Anxiety     Asthma     [facility]     Blood clotting disorder ([facility] [address] [facility] 2014    was on coumadin until 2014    Breast cyst 7-8 yrs ago    rt    Depression     Hyperlipidemia     Hypertension     Schizophrenia ([facility] [address]        SURGICALHISTORY      has a past surgical history that includes  section; Breast surgery (10 yrs); Tubal ligation; fasciotomy (Left, 2013); other surgical history (3/19/2013); and Elbow surgery (Left, 2013).     CURRENT MEDICATIONS       Discharge Medication List as of 2018  7:18 PM      CONTINUE these medications which have NOT CHANGED    Details   amLODIPine (NORVASC) 5 MG tablet TAKE 1 TABLET BY MOUTH DAILY, Disp-30 tablet, R-4Normal      loratadine (CLARITIN) 10 MG tablet TAKE 1 TABLET BY MOUTH EVERY DAY, Disp-30 tablet, R-5Normal      aspirin 81 MG EC tablet TAKE 1 TABLET BY MOUTH DAILY, Disp-30 tablet, R-11Normal simvastatin (ZOCOR) 40 MG tablet TAKE 1 TABLET BY MOUTH NIGHTLY, Disp-30 tablet, R-6Normal      sertraline (ZOLOFT) 25 MG tablet TAKE 1 TABLET BY MOUTH DAILY, Disp-30 tablet, R-3Normal      pantoprazole (PROTONIX) 40 MG tablet TAKE 1 TABLET BY MOUTH DAILY, Disp-30 tablet, R-5Normal      lisinopril-hydrochlorothiazide (PRINZIDE;ZESTORETIC) 10-12.5 MG per tablet TAKE 1 TABLET BY MOUTH EVERY DAY, Disp-30 tablet, R-5Normal      fluticasone (FLONASE) 50 MCG/ACT nasal spray USE 1 SPRAY INTO EACH NOSTRIL EVERY DAY AS DIRECTED, Disp-1 Bottle, R-5Normal      [facility] B-1 100 MG tablet TAKE 1 TABLET BY MOUTH DAILY, Disp-30 tablet, R-10Normal      formoterol (FORADIL AEROLIZER) 12 MCG capsule for inhaler Inhale 1 capsule into the lungs 2 times daily, Disp-60 capsule, R-3Normal      guaiFENesin (MUCINEX) 600 MG extended release tablet Take 1 tablet by mouth 2 times daily, Disp-30 tablet, R-0Normal      !! albuterol sulfate HFA (PROVENTIL HFA) 108 (90 Base) MCG/ACT inhaler Inhale 2 puffs into the lungs every 4 hours as needed for Wheezing or Shortness of Breath (Space out to every 6 hours as symptoms improve) Space out to every 6 hours as symptoms improve., Disp-1 Inhaler, R-0Print      ondansetron (ZOFRAN ODT) 4 MG disintegrating tablet Take 1 tablet by mouth every 8 hours as needed for Nausea, Disp-20 tablet, R-0Print      Calcium-Vitamin D-Vitamin K (CALCIUM + D) 500-1000-40 MG-UNT-MCG CHEW Take 1 tablet by mouth daily, Disp-30 tablet, R-11Normal      !! albuterol sulfate HFA (PROAIR HFA) 108 (90 BASE) MCG/ACT inhaler Inhale 2 puffs into the lungs every 4 hours as needed for Wheezing, Disp-1 Inhaler, R-0PLEASE REFILL      calcium carbonate 648 MG TABS TAKE 1 TABLET BY MOUTH TWICE A DAY, Disp-60 tablet, R-5       !! - Potential duplicate medications found. Please discuss with provider. ALLERGIES     is allergic to pork-derived products. FAMILY HISTORY     indicated that her mother is .  She indicated that her 12/17/18 1648 12/17/18 1759   BP: 99/65 (!) 108/94 112/87   Pulse: 84 80 76   Resp: 17 18 17   Temp: 97.8 °F (36.6 °C)     TempSrc: Oral     SpO2: 98% 97% 98%       MDM    Patient was seen and evaluated in the emergency department, patient appeared to be no acute distress. Vital signs were reviewed, no significant findings were noted. Physical exam completed, no significant findings were noted. No calf tenderness no and her thigh tenderness noted, no signs of DVT noted. Lungs are clear to auscultation. Patient's act appeared to be slightly intoxicated. Labs are obtained, confirming this. Negative troponin initially andDenies rash 2 hours. D-dimer was negative. I discussed my findings my plan of care the patient she is amenable discharge. It appears she had a cardiac workup in 2017 which was negative for any acute abnormality. She is going to be referred to the chest pain clinic. She was amenable to this plan of care. While here in the emergency department she maintained stable course is appropriate for discharge. Case my attending Dr. Tabby Gloria, agrees with plan of care. Medications - No data to display    Patient was seenindependently by myself. The patient's final impression and disposition and plan was determined by myself. CRITICAL CARE:   None    CONSULTS:  Dr. Tabby Gloria (ED Physician)    PROCEDURES:  None    FINAL IMPRESSION     1. Chest pain, unspecified type    2. Grief    3.  Acute alcoholic intoxication without complication [facility])          DISPOSITION/PLAN   Patient discharged in stable condition    PATIENT REFERREDTO:  [id number] [facility] [id number] [phone]  Go to   12/18/18 at 1030 am      DISCHARGE MEDICATIONS:  Discharge Medication List as of 12/17/2018  7:18 PM          (Please note that portions of this note were completed with a voice recognition program.  Efforts were made to edit the dictations but occasionally words are mis-transcribed.)    Scribe:  Carolin Fu 12/17/18 3:17 PM Scribing for and in the presence of Vito Chopra CNP. Signed by: Bailee Vo, 12/17/18 8:53 PM    Provider:  I personally performed the services described in the documentation,reviewed and edited the documentation which was dictated to the scribe in my presence, and it accurately records my words and actions.     Vito Chopra CNP 12/17/18 8:53 PM    Xavi Chopra, MELLY - UNIQUE       [employer], MELLY - CNP  12/17/18 2053

## 2018-12-18 ENCOUNTER — OFFICE VISIT (OUTPATIENT)
Dept: CARDIOLOGY CLINIC | Age: 58
End: 2018-12-18
Payer: COMMERCIAL

## 2018-12-18 VITALS
DIASTOLIC BLOOD PRESSURE: 72 MMHG | SYSTOLIC BLOOD PRESSURE: 113 MMHG | BODY MASS INDEX: 21.49 KG/M2 | HEART RATE: 72 BPM | WEIGHT: 113.8 LBS | HEIGHT: 61 IN

## 2018-12-18 DIAGNOSIS — R06.02 SOB (SHORTNESS OF BREATH) ON EXERTION: ICD-10-CM

## 2018-12-18 DIAGNOSIS — I10 ESSENTIAL HYPERTENSION: ICD-10-CM

## 2018-12-18 DIAGNOSIS — E78.5 HYPERLIPIDEMIA, UNSPECIFIED HYPERLIPIDEMIA TYPE: ICD-10-CM

## 2018-12-18 DIAGNOSIS — K21.9 GASTROESOPHAGEAL REFLUX DISEASE WITHOUT ESOPHAGITIS: ICD-10-CM

## 2018-12-18 DIAGNOSIS — Z72.0 TOBACCO ABUSE: ICD-10-CM

## 2018-12-18 DIAGNOSIS — R07.89 CHEST PAIN, ATYPICAL: Primary | ICD-10-CM

## 2018-12-18 DIAGNOSIS — R00.2 INTERMITTENT PALPITATIONS: ICD-10-CM

## 2018-12-18 PROCEDURE — G8484 FLU IMMUNIZE NO ADMIN: HCPCS | Performed by: INTERNAL MEDICINE

## 2018-12-18 PROCEDURE — G8420 CALC BMI NORM PARAMETERS: HCPCS | Performed by: INTERNAL MEDICINE

## 2018-12-18 PROCEDURE — G8427 DOCREV CUR MEDS BY ELIG CLIN: HCPCS | Performed by: INTERNAL MEDICINE

## 2018-12-18 PROCEDURE — 3017F COLORECTAL CA SCREEN DOC REV: CPT | Performed by: INTERNAL MEDICINE

## 2018-12-18 PROCEDURE — 4004F PT TOBACCO SCREEN RCVD TLK: CPT | Performed by: INTERNAL MEDICINE

## 2018-12-18 PROCEDURE — 99204 OFFICE O/P NEW MOD 45 MIN: CPT | Performed by: INTERNAL MEDICINE

## 2018-12-18 RX ORDER — NAPROXEN 500 MG/1
500 TABLET ORAL 2 TIMES DAILY WITH MEALS
COMMUNITY
End: 2018-12-18 | Stop reason: SDUPTHER

## 2018-12-18 NOTE — PROGRESS NOTES
Chief Complaint   Patient presents with    Establish Cardiologist    Follow-Up from [facility]   NP here - CP/ER    Pt denharshad BIRD  Lost her grandson few days back and had cp for 2 weeks    Chest Pain   Patient complains of chest pain. For the last 2 weeks . Retrosternal, sudden in onset, Symptoms have been unchanged since that time. The patient's pain is intermittent. The patient describes the pain as pressure and does not radiate. Patient rates pain as a 6/10 in intensity. Associated symptoms are: palpitations. Aggravating factors are: none. Alleviating factors are: none. CP last 10 min  freq daily    Intermittent palpitations    Worse with being upset    Pt did not bring a medication list. Pt verbally stated nothing has changed.        Patient Active Problem List   Diagnosis    Alcohol abuse    Hypertension    Hyperlipidemia    GERD (gastroesophageal reflux disease)    Adenomatous colon polyp    Chest pain, atypical    Intermittent palpitations    SOB (shortness of breath) on exertion    Tobacco abuse       Past Surgical History:   Procedure Laterality Date    BREAST SURGERY  10 yrs    lump right     SECTION      x 1    ELBOW SURGERY Left 2013    Deep hardware removal with ulnar nerve decompression    FASCIOTOMY Left 2013    L arm    OTHER SURGICAL HISTORY  3/19/2013    LEFT ELBOW ORIF WITH WOUND CLOSURE    TUBAL LIGATION         Allergies   Allergen Reactions    Pork-Derived Products Nausea Only        Family History   Problem Relation Age of Onset    Cancer Mother     Diabetes Father     Cancer Maternal Uncle         bone    Cancer Maternal Grandmother         uterine    Diabetes Maternal Grandfather     Cancer Brother         Colon Cancer        Social History     Social History    Marital status: Single     Spouse name: N/A    Number of children: 3    Years of education: 11     Occupational History    SSI      Social History Main Topics    Smoking status: Current Some Day Smoker     Packs/day: 0.00     Years: 35.00     Types: Cigarettes    Smokeless tobacco: Never Used    Alcohol use 3.0 oz/week     5 Glasses of wine per week      Comment: wine occasional    Drug use: No    Sexual activity: Yes     Partners: Male     Other Topics Concern    Not on file     Social History Narrative    No narrative on file       Current Outpatient Prescriptions   Medication Sig Dispense Refill    amLODIPine (NORVASC) 5 MG tablet TAKE 1 TABLET BY MOUTH DAILY 30 tablet 4    loratadine (CLARITIN) 10 MG tablet TAKE 1 TABLET BY MOUTH EVERY DAY 30 tablet 5    aspirin 81 MG EC tablet TAKE 1 TABLET BY MOUTH DAILY 30 tablet 11    simvastatin (ZOCOR) 40 MG tablet TAKE 1 TABLET BY MOUTH NIGHTLY 30 tablet 6    sertraline (ZOLOFT) 25 MG tablet TAKE 1 TABLET BY MOUTH DAILY 30 tablet 3    pantoprazole (PROTONIX) 40 MG tablet TAKE 1 TABLET BY MOUTH DAILY 30 tablet 5    lisinopril-hydrochlorothiazide (PRINZIDE;ZESTORETIC) 10-12.5 MG per tablet TAKE 1 TABLET BY MOUTH EVERY DAY 30 tablet 5    fluticasone (FLONASE) 50 MCG/ACT nasal spray USE 1 SPRAY INTO EACH NOSTRIL EVERY DAY AS DIRECTED 1 Bottle 5    [facility] B-1 100 MG tablet TAKE 1 TABLET BY MOUTH DAILY 30 tablet 10    formoterol (FORADIL AEROLIZER) 12 MCG capsule for inhaler Inhale 1 capsule into the lungs 2 times daily 60 capsule 3    guaiFENesin (MUCINEX) 600 MG extended release tablet Take 1 tablet by mouth 2 times daily 30 tablet 0    albuterol sulfate HFA (PROVENTIL HFA) 108 (90 Base) MCG/ACT inhaler Inhale 2 puffs into the lungs every 4 hours as needed for Wheezing or Shortness of Breath (Space out to every 6 hours as symptoms improve) Space out to every 6 hours as symptoms improve.  1 Inhaler 0    ondansetron (ZOFRAN ODT) 4 MG disintegrating tablet Take 1 tablet by mouth every 8 hours as needed for Nausea 20 tablet 0    Calcium-Vitamin D-Vitamin K (CALCIUM + D) 500-1000-40 MG-UNT-MCG CHEW Take 1 tablet by mouth daily 30 tablet 11    albuterol sulfate HFA (PROAIR HFA) 108 (90 BASE) MCG/ACT inhaler Inhale 2 puffs into the lungs every 4 hours as needed for Wheezing 1 Inhaler 0    calcium carbonate 648 MG TABS TAKE 1 TABLET BY MOUTH TWICE A DAY 60 tablet 5     No current facility-administered medications for this visit. Review of Systems -     General ROS: negative  Psychological ROS: negative  Hematological and Lymphatic ROS: No history of blood clots or bleeding disorder. Respiratory ROS: no cough,  or wheezing, the rest see HPI  Cardiovascular ROS: See HPI  Gastrointestinal ROS: negative  Genito-Urinary ROS: no dysuria, trouble voiding, or hematuria  Musculoskeletal ROS: negative  Neurological ROS: no TIA or stroke symptoms  Dermatological ROS: negative      Blood pressure 82/60, pulse 88, height 5' 1\" (1.549 m), weight 113 lb 12.8 oz (51.6 kg), not currently breastfeeding. Physical Examination:    General appearance - alert, well appearing, and in no distress  HEENT- Pink conjunctiva  , Non-icteri sclera,PERRLA  Mental status - alert, oriented to person, place, and time  Neck - supple, no significant adenopathy, no JVD, or carotid bruits  Chest - clear to auscultation, no wheezes, rales or rhonchi, symmetric air entry  Heart - normal rate, regular rhythm, normal S1, S2, no murmurs, rubs, clicks or gallops  Abdomen - soft, nontender, nondistended, no masses or organomegaly  PAPA- no CVA or flank tenderness, no suprapubic tenderness  Neurological - alert, oriented, normal speech, no focal findings or movement disorder noted  Musculoskeletal/limbs - no joint tenderness, deformity or swelling   - peripheral pulses normal, no pedal edema, no clubbing or cyanosis  Skin - normal coloration and turgor, no rashes, no suspicious skin lesions noted  Psych- appropriate mood and affect    Lab  No results for input(s): CKTOTAL, CKMB, CKMBINDEX, TROPONINI in the last 72 hours.   CBC:   Lab Results   Component Value Date    WBC 6.9 12/17/2018    RBC 4.21 12/17/2018    RBC 4.16 06/01/2012    HGB 14.2 12/17/2018    HCT 41.0 12/17/2018    MCV 97.4 12/17/2018    MCH 33.7 12/17/2018    MCHC 34.6 12/17/2018    RDW 13.9 06/30/2017     12/17/2018    MPV 9.6 12/17/2018     BMP:    Lab Results   Component Value Date     12/17/2018    K 3.9 12/17/2018     12/17/2018    CO2 23 12/17/2018    BUN 9 12/17/2018    LABALBU 3.5 06/30/2017    LABALBU 4.4 06/01/2012    CREATININE 1.1 12/17/2018    CALCIUM 8.8 12/17/2018    LABGLOM 62 12/17/2018    GLUCOSE 118 12/17/2018    GLUCOSE 107 06/01/2012     Hepatic Function Panel:    Lab Results   Component Value Date    ALKPHOS 115 06/30/2017    ALT 15 06/30/2017    AST 34 06/30/2017    PROT 7.2 06/30/2017    BILITOT 0.3 06/30/2017    BILIDIR <0.2 06/27/2014    LABALBU 3.5 06/30/2017    LABALBU 4.4 06/01/2012     Magnesium:    Lab Results   Component Value Date    MG 2.0 06/30/2017     Warfarin PT/INR:  No components found for: PTPATWAR, PTINRWAR  HgBA1c:  No results found for: LABA1C  FLP:    Lab Results   Component Value Date    TRIG 297 07/10/2017    HDL 82 07/10/2017    LDLCALC 53 07/10/2017     TSH:    Lab Results   Component Value Date    TSH 1.030 12/17/2018       EKG 12/18/18  NSR normal EKG    Assessment   Diagnosis Orders   1. Chest pain, atypical     2. Intermittent palpitations     3. SOB (shortness of breath) on exertion     4. Essential hypertension     5. Hyperlipidemia, unspecified hyperlipidemia type     6. Tobacco abuse     7. Gastroesophageal reflux disease without esophagitis           Plan   Continue the current treatment and with constant vigilance to changes in symptoms and also any potential side effects. Return for care or seek medical attention immediately if symptoms got worse and/or develop new symptoms.     Chest pain  Severe chest wall tenderness  Sob  Trop neg x2  Naproxen 500 bid for 5 day  Echo  lexiscan nuc  Asa 81    ER record reviewed    D/w the pat the plan of

## 2018-12-19 RX ORDER — NAPROXEN 500 MG/1
500 TABLET ORAL 2 TIMES DAILY WITH MEALS
Qty: 10 TABLET | Refills: 0 | OUTPATIENT
Start: 2018-12-19 | End: 2019-01-15 | Stop reason: ALTCHOICE

## 2019-01-02 ENCOUNTER — TELEPHONE (OUTPATIENT)
Dept: CARDIOLOGY CLINIC | Age: 59
End: 2019-01-02

## 2019-01-02 DIAGNOSIS — E78.5 HYPERLIPIDEMIA, UNSPECIFIED HYPERLIPIDEMIA TYPE: ICD-10-CM

## 2019-01-02 DIAGNOSIS — I10 ESSENTIAL HYPERTENSION: Primary | ICD-10-CM

## 2019-01-02 DIAGNOSIS — R07.89 CHEST PAIN, ATYPICAL: ICD-10-CM

## 2019-01-02 DIAGNOSIS — R00.2 INTERMITTENT PALPITATIONS: ICD-10-CM

## 2019-01-15 ENCOUNTER — TELEPHONE (OUTPATIENT)
Dept: FAMILY MEDICINE CLINIC | Age: 59
End: 2019-01-15

## 2019-01-15 ENCOUNTER — OFFICE VISIT (OUTPATIENT)
Dept: FAMILY MEDICINE CLINIC | Age: 59
End: 2019-01-15
Payer: COMMERCIAL

## 2019-01-15 VITALS
RESPIRATION RATE: 16 BRPM | TEMPERATURE: 98.5 F | SYSTOLIC BLOOD PRESSURE: 134 MMHG | HEIGHT: 61 IN | HEART RATE: 83 BPM | BODY MASS INDEX: 21.52 KG/M2 | DIASTOLIC BLOOD PRESSURE: 82 MMHG | WEIGHT: 114 LBS

## 2019-01-15 DIAGNOSIS — B96.81 H PYLORI ULCER: Primary | ICD-10-CM

## 2019-01-15 DIAGNOSIS — F43.21 GRIEF REACTION: ICD-10-CM

## 2019-01-15 DIAGNOSIS — Z12.39 SCREENING FOR BREAST CANCER: ICD-10-CM

## 2019-01-15 DIAGNOSIS — R19.5 LOOSE STOOLS: ICD-10-CM

## 2019-01-15 DIAGNOSIS — F34.1 DYSTHYMIA: ICD-10-CM

## 2019-01-15 DIAGNOSIS — K25.3 ACUTE GASTRIC ULCER WITHOUT HEMORRHAGE OR PERFORATION: ICD-10-CM

## 2019-01-15 DIAGNOSIS — R10.84 GENERALIZED ABDOMINAL PAIN: Primary | ICD-10-CM

## 2019-01-15 DIAGNOSIS — K27.9 H PYLORI ULCER: Primary | ICD-10-CM

## 2019-01-15 DIAGNOSIS — F10.10 ALCOHOL ABUSE: ICD-10-CM

## 2019-01-15 DIAGNOSIS — R74.01 TRANSAMINITIS: ICD-10-CM

## 2019-01-15 PROCEDURE — G8484 FLU IMMUNIZE NO ADMIN: HCPCS | Performed by: NURSE PRACTITIONER

## 2019-01-15 PROCEDURE — 3017F COLORECTAL CA SCREEN DOC REV: CPT | Performed by: NURSE PRACTITIONER

## 2019-01-15 PROCEDURE — 99215 OFFICE O/P EST HI 40 MIN: CPT | Performed by: NURSE PRACTITIONER

## 2019-01-15 PROCEDURE — 4004F PT TOBACCO SCREEN RCVD TLK: CPT | Performed by: NURSE PRACTITIONER

## 2019-01-15 PROCEDURE — G8420 CALC BMI NORM PARAMETERS: HCPCS | Performed by: NURSE PRACTITIONER

## 2019-01-15 PROCEDURE — G8427 DOCREV CUR MEDS BY ELIG CLIN: HCPCS | Performed by: NURSE PRACTITIONER

## 2019-01-15 RX ORDER — AMOXICILLIN 500 MG/1
1 TABLET, FILM COATED ORAL 2 TIMES DAILY
Qty: 28 TABLET | Refills: 0 | Status: SHIPPED | OUTPATIENT
Start: 2019-01-15 | End: 2019-01-29

## 2019-01-15 RX ORDER — OMEPRAZOLE 20 MG/1
20 CAPSULE, DELAYED RELEASE ORAL 2 TIMES DAILY
Qty: 28 CAPSULE | Refills: 3 | Status: SHIPPED | OUTPATIENT
Start: 2019-01-15 | End: 2020-01-10 | Stop reason: SDUPTHER

## 2019-01-15 RX ORDER — CLARITHROMYCIN 500 MG/1
500 TABLET, COATED ORAL 2 TIMES DAILY
Qty: 28 TABLET | Refills: 0 | Status: SHIPPED | OUTPATIENT
Start: 2019-01-15 | End: 2019-01-29

## 2019-01-15 ASSESSMENT — ENCOUNTER SYMPTOMS
ABDOMINAL PAIN: 1
CONSTIPATION: 0
DIARRHEA: 1
NAUSEA: 0
RESPIRATORY NEGATIVE: 1
ABDOMINAL DISTENTION: 0

## 2019-01-15 ASSESSMENT — PATIENT HEALTH QUESTIONNAIRE - PHQ9: DEPRESSION UNABLE TO ASSESS: URGENT/EMERGENT SITUATION

## 2019-01-22 ENCOUNTER — TELEPHONE (OUTPATIENT)
Dept: FAMILY MEDICINE CLINIC | Age: 59
End: 2019-01-22

## 2019-01-22 LAB
ALBUMIN SERPL-MCNC: 4.1 GM/DL (ref 3.5–5)
ALP BLD-CCNC: 110 IU/L (ref 39–118)
ALT SERPL-CCNC: 29 IU/L (ref 10–40)
AST SERPL-CCNC: 53 IU/L (ref 15–41)
BILIRUB SERPL-MCNC: 0.7 MG/DL (ref 0.2–1)
BILIRUBIN DIRECT: 0.2 MG/DL (ref 0.1–0.2)
TOTAL PROTEIN: 8 G/DL (ref 6.2–8)

## 2019-01-23 ENCOUNTER — HOSPITAL ENCOUNTER (OUTPATIENT)
Dept: WOMENS IMAGING | Age: 59
Discharge: HOME | End: 2019-01-23
Payer: COMMERCIAL

## 2019-01-23 DIAGNOSIS — Z12.39 SCREENING FOR BREAST CANCER: ICD-10-CM

## 2019-01-23 PROCEDURE — 77063 BREAST TOMOSYNTHESIS BI: CPT

## 2019-03-06 DIAGNOSIS — J30.0 CHRONIC VASOMOTOR RHINITIS: ICD-10-CM

## 2019-03-07 RX ORDER — FLUTICASONE PROPIONATE 50 MCG
SPRAY, SUSPENSION (ML) NASAL
Qty: 1 BOTTLE | Refills: 5 | Status: SHIPPED | OUTPATIENT
Start: 2019-03-07 | End: 2019-09-30 | Stop reason: SDUPTHER

## 2019-06-11 ENCOUNTER — HOSPITAL ENCOUNTER (EMERGENCY)
Age: 59
Discharge: HOME | End: 2019-06-11
Attending: INTERNAL MEDICINE
Payer: COMMERCIAL

## 2019-06-11 ENCOUNTER — APPOINTMENT (OUTPATIENT)
Dept: GENERAL RADIOLOGY | Age: 59
End: 2019-06-11
Payer: COMMERCIAL

## 2019-06-11 VITALS
HEIGHT: 61 IN | HEART RATE: 95 BPM | RESPIRATION RATE: 18 BRPM | WEIGHT: 114 LBS | BODY MASS INDEX: 21.52 KG/M2 | DIASTOLIC BLOOD PRESSURE: 87 MMHG | OXYGEN SATURATION: 98 % | TEMPERATURE: 97.6 F | SYSTOLIC BLOOD PRESSURE: 118 MMHG

## 2019-06-11 DIAGNOSIS — R07.81 RIB PAIN ON RIGHT SIDE: Primary | ICD-10-CM

## 2019-06-11 LAB
ALBUMIN SERPL-MCNC: 3.7 G/DL (ref 3.5–5.1)
ALP BLD-CCNC: 133 U/L (ref 38–126)
ALT SERPL-CCNC: 34 U/L (ref 11–66)
ANION GAP SERPL CALCULATED.3IONS-SCNC: 17 MEQ/L (ref 8–16)
AST SERPL-CCNC: 101 U/L (ref 5–40)
BASOPHILS # BLD: 0.4 %
BASOPHILS ABSOLUTE: 0 THOU/MM3 (ref 0–0.1)
BILIRUB SERPL-MCNC: 0.4 MG/DL (ref 0.3–1.2)
BILIRUBIN DIRECT: < 0.2 MG/DL (ref 0–0.3)
BUN BLDV-MCNC: 5 MG/DL (ref 7–22)
CALCIUM SERPL-MCNC: 8.8 MG/DL (ref 8.5–10.5)
CHLORIDE BLD-SCNC: 102 MEQ/L (ref 98–111)
CO2: 21 MEQ/L (ref 23–33)
CREAT SERPL-MCNC: 0.5 MG/DL (ref 0.4–1.2)
D-DIMER QUANTITATIVE: 243 NG/ML FEU (ref 0–500)
EOSINOPHIL # BLD: 1.8 %
EOSINOPHILS ABSOLUTE: 0.1 THOU/MM3 (ref 0–0.4)
ERYTHROCYTE [DISTWIDTH] IN BLOOD BY AUTOMATED COUNT: 11.8 % (ref 11.5–14.5)
ERYTHROCYTE [DISTWIDTH] IN BLOOD BY AUTOMATED COUNT: 42.6 FL (ref 35–45)
GFR SERPL CREATININE-BSD FRML MDRD: > 90 ML/MIN/1.73M2
GLUCOSE BLD-MCNC: 113 MG/DL (ref 70–108)
HCT VFR BLD CALC: 39.5 % (ref 37–47)
HEMOGLOBIN: 13.6 GM/DL (ref 12–16)
IMMATURE GRANS (ABS): 0.02 THOU/MM3 (ref 0–0.07)
IMMATURE GRANULOCYTES: 0.2 %
LIPASE: 20.5 U/L (ref 5.6–51.3)
LYMPHOCYTES # BLD: 27.3 %
LYMPHOCYTES ABSOLUTE: 2.3 THOU/MM3 (ref 1–4.8)
MCH RBC QN AUTO: 33.7 PG (ref 26–33)
MCHC RBC AUTO-ENTMCNC: 34.4 GM/DL (ref 32.2–35.5)
MCV RBC AUTO: 97.8 FL (ref 81–99)
MONOCYTES # BLD: 9.4 %
MONOCYTES ABSOLUTE: 0.8 THOU/MM3 (ref 0.4–1.3)
NUCLEATED RED BLOOD CELLS: 0 /100 WBC
OSMOLALITY CALCULATION: 277.5 MOSMOL/KG (ref 275–300)
PLATELET # BLD: 269 THOU/MM3 (ref 130–400)
PMV BLD AUTO: 9.9 FL (ref 9.4–12.4)
POTASSIUM SERPL-SCNC: 4 MEQ/L (ref 3.5–5.2)
RBC # BLD: 4.04 MILL/MM3 (ref 4.2–5.4)
SEG NEUTROPHILS: 60.9 %
SEGMENTED NEUTROPHILS ABSOLUTE COUNT: 5.1 THOU/MM3 (ref 1.8–7.7)
SODIUM BLD-SCNC: 140 MEQ/L (ref 135–145)
TOTAL PROTEIN: 7.8 G/DL (ref 6.1–8)
TROPONIN T: < 0.01 NG/ML
WBC # BLD: 8.3 THOU/MM3 (ref 4.8–10.8)

## 2019-06-11 PROCEDURE — 96372 THER/PROPH/DIAG INJ SC/IM: CPT

## 2019-06-11 PROCEDURE — 82248 BILIRUBIN DIRECT: CPT

## 2019-06-11 PROCEDURE — 85025 COMPLETE CBC W/AUTO DIFF WBC: CPT

## 2019-06-11 PROCEDURE — 36415 COLL VENOUS BLD VENIPUNCTURE: CPT

## 2019-06-11 PROCEDURE — 83690 ASSAY OF LIPASE: CPT

## 2019-06-11 PROCEDURE — 99283 EMERGENCY DEPT VISIT LOW MDM: CPT

## 2019-06-11 PROCEDURE — 80053 COMPREHEN METABOLIC PANEL: CPT

## 2019-06-11 PROCEDURE — 6360000002 HC RX W HCPCS: Performed by: INTERNAL MEDICINE

## 2019-06-11 PROCEDURE — 71101 X-RAY EXAM UNILAT RIBS/CHEST: CPT

## 2019-06-11 PROCEDURE — 85379 FIBRIN DEGRADATION QUANT: CPT

## 2019-06-11 PROCEDURE — 84484 ASSAY OF TROPONIN QUANT: CPT

## 2019-06-11 RX ORDER — KETOROLAC TROMETHAMINE 30 MG/ML
15 INJECTION, SOLUTION INTRAMUSCULAR; INTRAVENOUS ONCE
Status: COMPLETED | OUTPATIENT
Start: 2019-06-11 | End: 2019-06-11

## 2019-06-11 RX ORDER — LIDOCAINE 50 MG/G
1 PATCH TOPICAL DAILY
Qty: 30 PATCH | Refills: 0 | Status: SHIPPED | OUTPATIENT
Start: 2019-06-11 | End: 2019-07-11

## 2019-06-11 RX ADMIN — KETOROLAC TROMETHAMINE 15 MG: 30 INJECTION, SOLUTION INTRAMUSCULAR at 10:53

## 2019-06-11 ASSESSMENT — ENCOUNTER SYMPTOMS
SHORTNESS OF BREATH: 0
WHEEZING: 0
RHINORRHEA: 0
ABDOMINAL PAIN: 0
EYE PAIN: 0
COUGH: 0
VOMITING: 0
BACK PAIN: 0
DIARRHEA: 0
NAUSEA: 0
EYE DISCHARGE: 0
SORE THROAT: 0

## 2019-06-11 ASSESSMENT — PAIN DESCRIPTION - LOCATION: LOCATION: RIB CAGE

## 2019-06-11 ASSESSMENT — PAIN DESCRIPTION - PAIN TYPE: TYPE: ACUTE PAIN

## 2019-06-11 ASSESSMENT — PAIN DESCRIPTION - DESCRIPTORS: DESCRIPTORS: ACHING

## 2019-06-11 ASSESSMENT — PAIN SCALES - GENERAL
PAINLEVEL_OUTOF10: 8
PAINLEVEL_OUTOF10: 8

## 2019-06-11 ASSESSMENT — PAIN DESCRIPTION - ONSET: ONSET: GRADUAL

## 2019-06-11 ASSESSMENT — PAIN DESCRIPTION - FREQUENCY: FREQUENCY: INTERMITTENT

## 2019-06-11 ASSESSMENT — PAIN DESCRIPTION - ORIENTATION: ORIENTATION: RIGHT

## 2019-06-11 NOTE — ED NOTES
Dr. Cindy Howell at bedside to update on POC.  Pt rprts decreased pain; rates 6/10     Sharon Rangel, RN  06/11/19 [id number]

## 2019-06-11 NOTE — ED PROVIDER NOTES
[facility]  eMERGENCY dEPARTMENT eNCOUnter          CHIEF COMPLAINT       Chief Complaint   Patient presents with    Rib Pain       Nurses Notes reviewed and I agree except as noted in the HPI. HISTORY OF PRESENT ILLNESS    John Farias is a 60 y.o. female who presents to the Emergency Department for the evaluation of worsening right sided rib pain first onset 3 days ago while wresting with her boyfriend. She rates her current pain a 8/10 and stated that it is a worse when taking a deep breath. She denies hearing a crackling sound at the onset of pain. The patient denies any shortness of breath, nausea, vomiting, but admits to some mild dizziness and diaphoresis. The patient has a history of anxiety, hypertension hyperlipidemia, asthma, schizophrenia, depression and GERD. The patient denies any other symptoms or relevant history at this time. The HPI was provided by the patient. REVIEW OF SYSTEMS     Review of Systems   Constitutional: Negative for appetite change, chills, fatigue and fever. HENT: Negative for congestion, ear pain, rhinorrhea and sore throat. Eyes: Negative for pain, discharge and visual disturbance. Respiratory: Negative for cough, shortness of breath and wheezing. Cardiovascular: Negative for chest pain, palpitations and leg swelling. Gastrointestinal: Negative for abdominal pain, diarrhea, nausea and vomiting. Genitourinary: Negative for difficulty urinating, dysuria, hematuria and vaginal discharge. Musculoskeletal: Positive for arthralgias (right rib pain). Negative for back pain, joint swelling and neck pain. Skin: Negative for pallor and rash. Neurological: Negative for dizziness, syncope, weakness, light-headedness, numbness and headaches. Hematological: Negative for adenopathy. Psychiatric/Behavioral: Negative for confusion and suicidal ideas. The patient is not nervous/anxious.         PAST MEDICAL HISTORY    has a past medical history of Anxiety, Asthma, Bipolar disorder ([facility] [address] Blood clotting disorder ([facility] [address] Breast cyst, Depression, Duodenal ulcer, GERD (gastroesophageal reflux disease), Hyperlipidemia, Hypertension, and Schizophrenia ([facility] [address] SURGICAL HISTORY    has a past surgical history that includes  section; Breast surgery (10 yrs); Tubal ligation; fasciotomy (Left, 2013); other surgical history (3/19/2013); Elbow surgery (Left, 2013); and Upper gastrointestinal endoscopy (2019). CURRENT MEDICATIONS       Previous Medications    ALBUTEROL SULFATE HFA (PROVENTIL HFA) 108 (90 BASE) MCG/ACT INHALER    Inhale 2 puffs into the lungs every 4 hours as needed for Wheezing or Shortness of Breath (Space out to every 6 hours as symptoms improve) Space out to every 6 hours as symptoms improve. AMLODIPINE (NORVASC) 5 MG TABLET    TAKE 1 TABLET BY MOUTH DAILY    [employer] B-1 100 MG TABLET    TAKE 1 TABLET BY MOUTH DAILY    FLUTICASONE (FLONASE) 50 MCG/ACT NASAL SPRAY    USE 1 SPRAY INTO EACH NOSTRIL EVERY DAY AS DIRECTED    LISINOPRIL-HYDROCHLOROTHIAZIDE (PRINZIDE;ZESTORETIC) 10-12.5 MG PER TABLET    TAKE 1 TABLET BY MOUTH EVERY DAY    LORATADINE (CLARITIN) 10 MG TABLET    TAKE 1 TABLET BY MOUTH EVERY DAY    OMEPRAZOLE (PRILOSEC) 20 MG DELAYED RELEASE CAPSULE    Take 1 capsule by mouth 2 times daily for 14 days    PANTOPRAZOLE (PROTONIX) 40 MG TABLET    TAKE 1 TABLET BY MOUTH DAILY    SERTRALINE (ZOLOFT) 50 MG TABLET    Take 1 tablet by mouth daily    SIMVASTATIN (ZOCOR) 40 MG TABLET    TAKE 1 TABLET BY MOUTH NIGHTLY       ALLERGIES     is allergic to pork-derived products. FAMILY HISTORY     indicated that her mother is . She indicated that her father is . She indicated that her brother is alive. She indicated that the status of her maternal grandmother is unknown. She indicated that the status of her maternal grandfather is unknown. She indicated that the status of her maternal uncle is unknown. She exhibits normal muscle tone. Coordination normal.   Skin: Skin is warm and dry. No rash noted. She is not diaphoretic. Nursing note and vitals reviewed. DIAGNOSTIC RESULTS     EKG: All EKG's are interpreted by the Emergency Department Physician who either signs or Co-signs this chart in the absence of a cardiologist.  EKG interpreted by Yousif Valle MD:        RADIOLOGY: non-plain film images(s) such as CT, Ultrasound and MRI are read by the radiologist.    XR RIBS RIGHT INCLUDE CHEST (MIN 3 VIEWS)   Final Result      No fracture. **This report has been created using voice recognition software. It may contain minor errors which are inherent in voice recognition technology. **      Final report electronically signed by Dr. Yash Graves on 6/11/2019 11:27 AM           LABS:   Labs Reviewed   CBC WITH AUTO DIFFERENTIAL - Abnormal; Notable for the following components:       Result Value    RBC 4.04 (*)     MCH 33.7 (*)     All other components within normal limits   BASIC METABOLIC PANEL - Abnormal; Notable for the following components:    CO2 21 (*)     Glucose 113 (*)     BUN 5 (*)     All other components within normal limits   HEPATIC FUNCTION PANEL - Abnormal; Notable for the following components:    Alkaline Phosphatase 133 (*)      (*)     All other components within normal limits   ANION GAP - Abnormal; Notable for the following components:    Anion Gap 17.0 (*)     All other components within normal limits   D-DIMER, QUANTITATIVE   LIPASE   TROPONIN   GLOMERULAR FILTRATION RATE, ESTIMATED   OSMOLALITY       EMERGENCY DEPARTMENT COURSE:   Vitals:    Vitals:    06/11/19 1040   BP: 118/87   Pulse: 95   Resp: 18   Temp: 97.6 °F (36.4 °C)   TempSrc: Oral   SpO2: 98%   Weight: 114 lb (51.7 kg)   Height: 5' 1\" (1.549 m)       10:48 AM: The patient was seen and evaluated. MDM:  She was given Toradol shot in the emergency department.   Patient's x-ray of the rib on the right side was negative for any fracture. D-dimer and troponin was also negative. Patient will be discharged home on lidocaine patch. Patient is advised to follow-up with her primary care physician tomorrow morning. She is also advised to come back to the emergency department if her symptoms get worse. Patient's labs or testing were reviewed discussed with the patient. CRITICAL CARE:        CONSULTS:      PROCEDURES:       FINAL IMPRESSION      1. Rib pain on right side          DISPOSITION/PLAN   Discharge    PATIENT REFERRED TO:  MELLY Bond CNP  [address]  [facility]  [phone]    Go in 1 day  If symptoms worsen    [facility] EMERGENCY DEPT  [address] [address]  [phone]  Go in 1 day  If symptoms worsen      DISCHARGE MEDICATIONS:  New Prescriptions    LIDOCAINE (LIDODERM) 5 %    Place 1 patch onto the skin daily 12 hours on, 12 hours off. (Please note that portions of this note were completed with a voice recognition program.  Efforts were made to edit the dictations but occasionally words are mis-transcribed.)    Scribe:  Miley Canales  6/11/19 10:48 AM Scribing for and in the presence of Laci Cain MD.    Signed by: Bailee Casas, 06/11/19 11:41 AM    Provider:  I personally performed the services described in the documentation, reviewed and edited the documentation which was dictated to the scribe in my presence, and it accurately records my words and actions.     Laci Cain MD 6/11/19 11:41 AM       Laci Cain MD  06/11/19 [id number]

## 2019-06-11 NOTE — ED TRIAGE NOTES
Pt to ED w/rprts of R rib pain after wrestling w/boyfriend three days ago. Rates 8/10, worsening of pain w/deep breathing or physical exertion. Pt alert and oriented x4. Breathing easy and unlabored on RA.

## 2019-07-20 DIAGNOSIS — J30.1 ALLERGIC RHINITIS DUE TO POLLEN: ICD-10-CM

## 2019-07-20 DIAGNOSIS — F34.1 DYSTHYMIA: ICD-10-CM

## 2019-07-22 RX ORDER — LORATADINE 10 MG/1
TABLET ORAL
Qty: 30 TABLET | Refills: 5 | Status: SHIPPED | OUTPATIENT
Start: 2019-07-22 | End: 2020-02-03

## 2019-09-17 ENCOUNTER — HOSPITAL ENCOUNTER (EMERGENCY)
Age: 59
Discharge: HOME | End: 2019-09-17
Payer: COMMERCIAL

## 2019-09-17 ENCOUNTER — APPOINTMENT (OUTPATIENT)
Dept: GENERAL RADIOLOGY | Age: 59
End: 2019-09-17
Payer: COMMERCIAL

## 2019-09-17 VITALS
DIASTOLIC BLOOD PRESSURE: 89 MMHG | BODY MASS INDEX: 21.52 KG/M2 | WEIGHT: 114 LBS | RESPIRATION RATE: 18 BRPM | SYSTOLIC BLOOD PRESSURE: 133 MMHG | OXYGEN SATURATION: 95 % | HEIGHT: 61 IN | TEMPERATURE: 98.8 F | HEART RATE: 85 BPM

## 2019-09-17 DIAGNOSIS — S80.02XA CONTUSION OF LEFT KNEE, INITIAL ENCOUNTER: Primary | ICD-10-CM

## 2019-09-17 PROCEDURE — 6370000000 HC RX 637 (ALT 250 FOR IP): Performed by: PHYSICIAN ASSISTANT

## 2019-09-17 PROCEDURE — 99283 EMERGENCY DEPT VISIT LOW MDM: CPT

## 2019-09-17 PROCEDURE — 73564 X-RAY EXAM KNEE 4 OR MORE: CPT

## 2019-09-17 PROCEDURE — 2709999900 HC NON-CHARGEABLE SUPPLY

## 2019-09-17 RX ORDER — TRAMADOL HYDROCHLORIDE 50 MG/1
50 TABLET ORAL ONCE
Status: COMPLETED | OUTPATIENT
Start: 2019-09-17 | End: 2019-09-17

## 2019-09-17 RX ORDER — TRAMADOL HYDROCHLORIDE 50 MG/1
50 TABLET ORAL EVERY 6 HOURS PRN
Qty: 10 TABLET | Refills: 0 | Status: SHIPPED | OUTPATIENT
Start: 2019-09-17 | End: 2019-09-20

## 2019-09-17 RX ADMIN — TRAMADOL HYDROCHLORIDE 50 MG: 50 TABLET, FILM COATED ORAL at 21:30

## 2019-09-17 ASSESSMENT — PAIN DESCRIPTION - DESCRIPTORS: DESCRIPTORS: SHARP

## 2019-09-17 ASSESSMENT — PAIN DESCRIPTION - FREQUENCY: FREQUENCY: INTERMITTENT

## 2019-09-17 ASSESSMENT — PAIN SCALES - GENERAL
PAINLEVEL_OUTOF10: 8
PAINLEVEL_OUTOF10: 8

## 2019-09-17 ASSESSMENT — ENCOUNTER SYMPTOMS
VOMITING: 0
NAUSEA: 0
BACK PAIN: 0

## 2019-09-17 ASSESSMENT — PAIN DESCRIPTION - LOCATION: LOCATION: KNEE

## 2019-09-17 ASSESSMENT — PAIN DESCRIPTION - PAIN TYPE: TYPE: ACUTE PAIN

## 2019-09-17 ASSESSMENT — PAIN DESCRIPTION - ORIENTATION: ORIENTATION: LEFT

## 2019-09-18 NOTE — ED PROVIDER NOTES
contusion    DIAGNOSTIC RESULTS     EKG: [facility] who either signs or Co-signs this chart in the absence of a cardiologist.  None     RADIOLOGY: non-plain film images(s) such as CT,Ultrasound and MRI are read by the radiologist.  Plain radiographic images are visualized and preliminarily interpreted by the emergency physician unless otherwise stated below. XR KNEE LEFT (MIN 4 VIEWS)   Final Result   Mild degenerative changes. Otherwise normal knee. No fracture. **This report has been created using voice recognition software. It may contain minor errors which are inherent in voice recognition technology. **            Final report electronically signed by Dr. Quang Rhodes on 9/17/2019 8:32 PM          LABS:   Labs Reviewed - No data to display    EMERGENCY DEPARTMENT COURSE:   Vitals:    Vitals:    09/17/19 2000   BP: 133/89   Pulse: 85   Resp: 18   Temp: 98.8 °F (37.1 °C)   TempSrc: Oral   SpO2: 95%   Weight: 114 lb (51.7 kg)   Height: 5' 1\" (1.549 m)       MDM:  The patient was seen within the ED today for the evaluation of left knee pain following a fall. The patient arrived in no acute distress and in stable condition. Within the department, I observed the patient's vital signs to be within acceptable range. On exam, I appreciated left knee tenderness, swelling, and decreased range of motion secondary to pain. Radiological studies within the department revealed mild degenerative changes. Within the department, the patient was treated with Ultram for pain management. I observed the patient's condition to remain stable during the duration of her stay. I explained my proposed course of treatment to the patient, who was amenable to my decision, and I answered all questions that were asked.  She was discharged home in stable condition with prescriptions for Ultram, and the patient will return to the ED if her symptoms become more severe in nature or

## 2019-09-30 DIAGNOSIS — J30.0 CHRONIC VASOMOTOR RHINITIS: ICD-10-CM

## 2019-09-30 RX ORDER — FLUTICASONE PROPIONATE 50 MCG
SPRAY, SUSPENSION (ML) NASAL
Qty: 1 BOTTLE | Refills: 5 | Status: SHIPPED | OUTPATIENT
Start: 2019-09-30 | End: 2020-04-01 | Stop reason: SDUPTHER

## 2019-11-14 DIAGNOSIS — I10 ESSENTIAL HYPERTENSION: ICD-10-CM

## 2019-11-14 RX ORDER — ASPIRIN 81 MG/1
TABLET, COATED ORAL
Qty: 90 TABLET | Refills: 3 | Status: SHIPPED | OUTPATIENT
Start: 2019-11-14

## 2020-01-10 ENCOUNTER — APPOINTMENT (OUTPATIENT)
Dept: CT IMAGING | Age: 60
End: 2020-01-10
Payer: COMMERCIAL

## 2020-01-10 ENCOUNTER — HOSPITAL ENCOUNTER (EMERGENCY)
Age: 60
Discharge: HOME | End: 2020-01-10
Attending: EMERGENCY MEDICINE
Payer: COMMERCIAL

## 2020-01-10 VITALS
DIASTOLIC BLOOD PRESSURE: 86 MMHG | WEIGHT: 114 LBS | SYSTOLIC BLOOD PRESSURE: 136 MMHG | HEART RATE: 79 BPM | HEIGHT: 61 IN | RESPIRATION RATE: 16 BRPM | OXYGEN SATURATION: 99 % | BODY MASS INDEX: 21.52 KG/M2 | TEMPERATURE: 97.9 F

## 2020-01-10 LAB
ALBUMIN SERPL-MCNC: 3.8 G/DL (ref 3.5–5.1)
ALP BLD-CCNC: 110 U/L (ref 38–126)
ALT SERPL-CCNC: 16 U/L (ref 11–66)
AMYLASE: 50 U/L (ref 20–104)
ANION GAP SERPL CALCULATED.3IONS-SCNC: 17 MEQ/L (ref 8–16)
AST SERPL-CCNC: 70 U/L (ref 5–40)
BASOPHILS # BLD: 0.5 %
BASOPHILS ABSOLUTE: 0 THOU/MM3 (ref 0–0.1)
BILIRUB SERPL-MCNC: 0.3 MG/DL (ref 0.3–1.2)
BILIRUBIN DIRECT: < 0.2 MG/DL (ref 0–0.3)
BUN BLDV-MCNC: 7 MG/DL (ref 7–22)
CALCIUM SERPL-MCNC: 8.5 MG/DL (ref 8.5–10.5)
CHLORIDE BLD-SCNC: 104 MEQ/L (ref 98–111)
CO2: 23 MEQ/L (ref 23–33)
CREAT SERPL-MCNC: 0.6 MG/DL (ref 0.4–1.2)
EOSINOPHIL # BLD: 2.1 %
EOSINOPHILS ABSOLUTE: 0.2 THOU/MM3 (ref 0–0.4)
ERYTHROCYTE [DISTWIDTH] IN BLOOD BY AUTOMATED COUNT: 14.6 % (ref 11.5–14.5)
ERYTHROCYTE [DISTWIDTH] IN BLOOD BY AUTOMATED COUNT: 53.3 FL (ref 35–45)
GFR SERPL CREATININE-BSD FRML MDRD: > 90 ML/MIN/1.73M2
GLUCOSE BLD-MCNC: 115 MG/DL (ref 70–108)
HCT VFR BLD CALC: 36.1 % (ref 37–47)
HEMOGLOBIN: 12.9 GM/DL (ref 12–16)
IMMATURE GRANS (ABS): 0.03 THOU/MM3 (ref 0–0.07)
IMMATURE GRANULOCYTES: 0.4 %
LIPASE: 14.7 U/L (ref 5.6–51.3)
LYMPHOCYTES # BLD: 38.9 %
LYMPHOCYTES ABSOLUTE: 3 THOU/MM3 (ref 1–4.8)
MCH RBC QN AUTO: 36 PG (ref 26–33)
MCHC RBC AUTO-ENTMCNC: 35.7 GM/DL (ref 32.2–35.5)
MCV RBC AUTO: 100.8 FL (ref 81–99)
MONOCYTES # BLD: 8.4 %
MONOCYTES ABSOLUTE: 0.6 THOU/MM3 (ref 0.4–1.3)
NUCLEATED RED BLOOD CELLS: 0 /100 WBC
OSMOLALITY CALCULATION: 285.7 MOSMOL/KG (ref 275–300)
PLATELET # BLD: 159 THOU/MM3 (ref 130–400)
PMV BLD AUTO: 9.9 FL (ref 9.4–12.4)
POTASSIUM SERPL-SCNC: 3.7 MEQ/L (ref 3.5–5.2)
RBC # BLD: 3.58 MILL/MM3 (ref 4.2–5.4)
SEG NEUTROPHILS: 49.7 %
SEGMENTED NEUTROPHILS ABSOLUTE COUNT: 3.8 THOU/MM3 (ref 1.8–7.7)
SODIUM BLD-SCNC: 144 MEQ/L (ref 135–145)
TOTAL PROTEIN: 7.4 G/DL (ref 6.1–8)
TROPONIN T: < 0.01 NG/ML
WBC # BLD: 7.6 THOU/MM3 (ref 4.8–10.8)

## 2020-01-10 PROCEDURE — 6360000004 HC RX CONTRAST MEDICATION: Performed by: EMERGENCY MEDICINE

## 2020-01-10 PROCEDURE — 84484 ASSAY OF TROPONIN QUANT: CPT

## 2020-01-10 PROCEDURE — C9113 INJ PANTOPRAZOLE SODIUM, VIA: HCPCS | Performed by: EMERGENCY MEDICINE

## 2020-01-10 PROCEDURE — 36415 COLL VENOUS BLD VENIPUNCTURE: CPT

## 2020-01-10 PROCEDURE — 2580000003 HC RX 258: Performed by: EMERGENCY MEDICINE

## 2020-01-10 PROCEDURE — 99284 EMERGENCY DEPT VISIT MOD MDM: CPT

## 2020-01-10 PROCEDURE — 85025 COMPLETE CBC W/AUTO DIFF WBC: CPT

## 2020-01-10 PROCEDURE — 6360000002 HC RX W HCPCS: Performed by: EMERGENCY MEDICINE

## 2020-01-10 PROCEDURE — 80053 COMPREHEN METABOLIC PANEL: CPT

## 2020-01-10 PROCEDURE — 83690 ASSAY OF LIPASE: CPT

## 2020-01-10 PROCEDURE — 82150 ASSAY OF AMYLASE: CPT

## 2020-01-10 PROCEDURE — 82248 BILIRUBIN DIRECT: CPT

## 2020-01-10 PROCEDURE — 74177 CT ABD & PELVIS W/CONTRAST: CPT

## 2020-01-10 PROCEDURE — 96374 THER/PROPH/DIAG INJ IV PUSH: CPT

## 2020-01-10 RX ORDER — PANTOPRAZOLE SODIUM 40 MG/1
TABLET, DELAYED RELEASE ORAL
Qty: 60 TABLET | Refills: 0 | Status: SHIPPED | OUTPATIENT
Start: 2020-01-10 | End: 2020-07-28 | Stop reason: SDUPTHER

## 2020-01-10 RX ORDER — SODIUM CHLORIDE 9 MG/ML
INJECTION, SOLUTION INTRAVENOUS CONTINUOUS
Status: DISCONTINUED | OUTPATIENT
Start: 2020-01-10 | End: 2020-01-10 | Stop reason: HOSPADM

## 2020-01-10 RX ORDER — PANTOPRAZOLE SODIUM 40 MG/10ML
40 INJECTION, POWDER, LYOPHILIZED, FOR SOLUTION INTRAVENOUS ONCE
Status: COMPLETED | OUTPATIENT
Start: 2020-01-10 | End: 2020-01-10

## 2020-01-10 RX ORDER — SUCRALFATE 1 G/1
TABLET ORAL
Qty: 60 TABLET | Refills: 0 | Status: SHIPPED | OUTPATIENT
Start: 2020-01-10 | End: 2021-08-18

## 2020-01-10 RX ADMIN — SODIUM CHLORIDE: 9 INJECTION, SOLUTION INTRAVENOUS at 14:59

## 2020-01-10 RX ADMIN — PANTOPRAZOLE SODIUM 40 MG: 40 INJECTION, POWDER, FOR SOLUTION INTRAVENOUS at 14:59

## 2020-01-10 RX ADMIN — IOPAMIDOL 80 ML: 755 INJECTION, SOLUTION INTRAVENOUS at 16:09

## 2020-01-10 ASSESSMENT — PAIN DESCRIPTION - PAIN TYPE: TYPE: ACUTE PAIN

## 2020-01-10 ASSESSMENT — PAIN SCALES - GENERAL: PAINLEVEL_OUTOF10: 8

## 2020-01-10 ASSESSMENT — PAIN DESCRIPTION - DESCRIPTORS: DESCRIPTORS: SHARP

## 2020-01-10 ASSESSMENT — PAIN DESCRIPTION - LOCATION: LOCATION: ABDOMEN

## 2020-01-10 ASSESSMENT — ENCOUNTER SYMPTOMS
ABDOMINAL DISTENTION: 0
ABDOMINAL PAIN: 1
VOMITING: 0
DIARRHEA: 0
NAUSEA: 0
BLOOD IN STOOL: 0
SHORTNESS OF BREATH: 0
CONSTIPATION: 0

## 2020-01-10 ASSESSMENT — PAIN DESCRIPTION - PROGRESSION: CLINICAL_PROGRESSION: GRADUALLY WORSENING

## 2020-01-10 ASSESSMENT — PAIN DESCRIPTION - ORIENTATION: ORIENTATION: MID

## 2020-01-10 ASSESSMENT — PAIN DESCRIPTION - FREQUENCY: FREQUENCY: INTERMITTENT

## 2020-01-10 ASSESSMENT — PAIN DESCRIPTION - ONSET: ONSET: ON-GOING

## 2020-01-10 NOTE — ED PROVIDER NOTES
[address] [facility]        CHIEF COMPLAINT    Chief Complaint   Patient presents with    Abdominal Pain       Nurses Notes reviewed and I agree except as noted in the HPI. HPI    Isaiah Serrano is a 60 y.o. female who presents for evaluation of epigastric abdominal pain and decreased appetite that has been ongoing for the last two days. She states that she feels as if she needs to pass gas but is unable to. She also admits to some black stool but states that she ate some greens and that it is likely what caused the color change. The patient denies ever experiencing similar symptoms in the past. She admits that she drinks wine daily morning, afternoon, and night. She denies having DTs or ever having medical issues secondary to her drinking in the past. She states that she has been drinking her whole life and cannot stop now secondary to stress. She has GERD and duodena ulcers. She had a colonoscopy in 2018. It is unclear which medications the patient is currently taking; she states that she takes \"everything that it in the computer,\" however our records show \"current\" medications that have not been refilled in months. Patient denies vomiting, bloody stools, fever, chills, and urinary symptoms. No further complaints at this time. REVIEW OF SYSTEMS    Review of Systems   Constitutional: Negative for appetite change, chills, diaphoresis, fatigue and fever. HENT: Negative for congestion, ear pain, postnasal drip, rhinorrhea, sinus pressure, sneezing, sore throat, trouble swallowing and voice change. Respiratory: Negative for cough, chest tightness, shortness of breath and wheezing. Cardiovascular: Negative for chest pain, palpitations and leg swelling. Gastrointestinal: Positive for abdominal pain (Epigastric). Negative for abdominal distention, blood in stool, constipation, diarrhea, nausea and vomiting.    Genitourinary: Musculoskeletal: Normal range of motion and neck supple. Vascular: No JVD. Cardiovascular:      Rate and Rhythm: Normal rate and regular rhythm. Pulses: Normal pulses. Heart sounds: Normal heart sounds. No murmur. No friction rub. No gallop. Pulmonary:      Effort: Pulmonary effort is normal. No respiratory distress. Breath sounds: Normal breath sounds. No decreased breath sounds, wheezing, rhonchi or rales. Abdominal:      General: Bowel sounds are normal. There is no distension. Palpations: Abdomen is soft. Tenderness: There is tenderness in the epigastric area. There is no guarding or rebound. Musculoskeletal: Normal range of motion. Skin:     General: Skin is warm and dry. Findings: No rash. Neurological:      Mental Status: She is alert and oriented to person, place, and time. Motor: No abnormal muscle tone. Coordination: Coordination normal.         MEDICAL DECISION MAKING    DIFFERENTIAL DIAGNOSIS:  Gastritis, peptic ulcer disease, GERD, alcoholism gallbladder disease, pyelonephritis UTI      DIAGNOSTIC RESULTS    EKG     None. RADIOLOGY:  I have reviewed radiologic plain film image(s). The plain films will be read or overread by the radiologist.All other non-plain film images(s) such as CT, Ultrasound and MRI have been read by the radiologist.  CT ABDOMEN PELVIS W IV CONTRAST Additional Contrast? None   Final Result      1. No acute infectious or inflammatory process is identified throughout the abdomen or pelvis. 2. Hepatic steatosis. **This report has been created using voice recognition software. It may contain minor errors which are inherent in voice recognition technology. **      Final report electronically signed by Dr Anita Kline on 1/10/2020 4:20 PM          LABS:   Labs Reviewed   CBC WITH AUTO DIFFERENTIAL - Abnormal; Notable for the following components:       Result Value    RBC 3.58 (*)     Hematocrit 36.1 (*)     MCV ([facility]    3. Gastroesophageal reflux disease without esophagitis          DISPOSITION/PLAN  PATIENT REFERRED TO:  Daria Holbrook, APRN - UNIQUE  [address]  [phone]          DISCHARGE MEDICATIONS:  Discharge Medication List as of 1/10/2020  5:05 PM      START taking these medications    Details   pantoprazole (PROTONIX) 40 MG tablet 1 tablet twice a day for 10 days followed by 1 tablet daily, Disp-60 tablet, R-0Print      sucralfate (CARAFATE) 1 GM tablet 1 tablet after meals and at bedtime, Disp-60 tablet, R-0Print             (Please note that portions of this note were completed with a voice recognition program.  Efforts were made to edit the dictations but occasionallywords are mis-transcribed.)    Scribe:  Manan Correa 01/20/20 2:45 PM Scribing for and in the presence of Lorena Gupta M.D. Signed by: Bailee Mcclain, 01/20/20 2:07 PM    Provider:  I personally performed the services described in the documentation, reviewed and edited the documentation which was dictated to the scribe in my presence, and it accurately records my words andactions.      01/20/20 2:07 PM      Lay Lomeli MD        Emergency room physician     Lay Lomeli MD  01/20/20 [id number]

## 2020-01-10 NOTE — ED TRIAGE NOTES
Patient arrived to room 37 with c/o ABD pain. Patient stated this started 2 days ago. Patient stated she has hx of ABD problems and had a scope done last febuary. Patient stated she has tried taken stuff at home with no improvement. Patient stated the is intermittent, stating it will go away then comes back very strong.

## 2020-01-14 ENCOUNTER — OFFICE VISIT (OUTPATIENT)
Dept: FAMILY MEDICINE CLINIC | Age: 60
End: 2020-01-14
Payer: COMMERCIAL

## 2020-01-14 VITALS
OXYGEN SATURATION: 97 % | HEART RATE: 90 BPM | SYSTOLIC BLOOD PRESSURE: 120 MMHG | BODY MASS INDEX: 20.42 KG/M2 | RESPIRATION RATE: 14 BRPM | DIASTOLIC BLOOD PRESSURE: 69 MMHG | WEIGHT: 104 LBS | HEIGHT: 60 IN | TEMPERATURE: 98.4 F

## 2020-01-14 PROCEDURE — G8427 DOCREV CUR MEDS BY ELIG CLIN: HCPCS | Performed by: NURSE PRACTITIONER

## 2020-01-14 PROCEDURE — G8482 FLU IMMUNIZE ORDER/ADMIN: HCPCS | Performed by: NURSE PRACTITIONER

## 2020-01-14 PROCEDURE — 90686 IIV4 VACC NO PRSV 0.5 ML IM: CPT | Performed by: NURSE PRACTITIONER

## 2020-01-14 PROCEDURE — 4004F PT TOBACCO SCREEN RCVD TLK: CPT | Performed by: NURSE PRACTITIONER

## 2020-01-14 PROCEDURE — 3017F COLORECTAL CA SCREEN DOC REV: CPT | Performed by: NURSE PRACTITIONER

## 2020-01-14 PROCEDURE — 90471 IMMUNIZATION ADMIN: CPT | Performed by: NURSE PRACTITIONER

## 2020-01-14 PROCEDURE — 99213 OFFICE O/P EST LOW 20 MIN: CPT | Performed by: NURSE PRACTITIONER

## 2020-01-14 PROCEDURE — G8420 CALC BMI NORM PARAMETERS: HCPCS | Performed by: NURSE PRACTITIONER

## 2020-01-14 RX ORDER — MEGESTROL ACETATE 20 MG/1
20 TABLET ORAL DAILY
Qty: 30 TABLET | Refills: 3 | Status: SHIPPED | OUTPATIENT
Start: 2020-01-14 | End: 2020-05-26

## 2020-01-14 ASSESSMENT — ENCOUNTER SYMPTOMS
VOMITING: 0
RECTAL PAIN: 0
ABDOMINAL PAIN: 1
RESPIRATORY NEGATIVE: 1
BLOOD IN STOOL: 1
RHINORRHEA: 0
SINUS PAIN: 0
CONSTIPATION: 0
ANAL BLEEDING: 0
SINUS PRESSURE: 0
NAUSEA: 0
DIARRHEA: 1
ABDOMINAL DISTENTION: 0

## 2020-01-14 ASSESSMENT — PATIENT HEALTH QUESTIONNAIRE - PHQ9
1. LITTLE INTEREST OR PLEASURE IN DOING THINGS: 0
SUM OF ALL RESPONSES TO PHQ9 QUESTIONS 1 & 2: 0
SUM OF ALL RESPONSES TO PHQ QUESTIONS 1-9: 0
SUM OF ALL RESPONSES TO PHQ QUESTIONS 1-9: 0
2. FEELING DOWN, DEPRESSED OR HOPELESS: 0

## 2020-01-14 NOTE — PROGRESS NOTES
4    megestrol (MEGACE) 20 MG tablet Take 1 tablet by mouth daily 30 tablet 3    pantoprazole (PROTONIX) 40 MG tablet 1 tablet twice a day for 10 days followed by 1 tablet daily 60 tablet 0    sucralfate (CARAFATE) 1 GM tablet 1 tablet after meals and at bedtime 60 tablet 0    ASPIRIN LOW DOSE 81 MG EC tablet TAKE 1 TABLET BY MOUTH DAILY 90 tablet 3    fluticasone (FLONASE) 50 MCG/ACT nasal spray USE 1 SPRAY INTO EACH NOSTRIL EVERY DAY AS DIRECTED 1 Bottle 5    loratadine (CLARITIN) 10 MG tablet TAKE 1 TABLET BY MOUTH EVERY DAY 30 tablet 5    simvastatin (ZOCOR) 40 MG tablet TAKE 1 TABLET BY MOUTH NIGHTLY 90 tablet 3    vitamin B-1 (THIAMINE) 100 MG tablet TAKE 1 TABLET BY MOUTH DAILY 90 tablet 3    amLODIPine (NORVASC) 5 MG tablet TAKE 1 TABLET BY MOUTH DAILY 90 tablet 3    lisinopril-hydrochlorothiazide (PRINZIDE;ZESTORETIC) 10-12.5 MG per tablet TAKE 1 TABLET BY MOUTH EVERY DAY 90 tablet 3    albuterol sulfate HFA (PROVENTIL HFA) 108 (90 Base) MCG/ACT inhaler Inhale 2 puffs into the lungs every 4 hours as needed for Wheezing or Shortness of Breath (Space out to every 6 hours as symptoms improve) Space out to every 6 hours as symptoms improve. 1 Inhaler 0     No current facility-administered medications for this visit.            Past Medical History:   Diagnosis Date    Anxiety     Asthma     Bipolar disorder ([facility] [address]     Blood clotting disorder ([facility] [address] 2014    was on coumadin until 2014    Breast cyst 7-8 yrs ago    rt    Depression     Duodenal ulcer 2019    GERD (gastroesophageal reflux disease)     Hyperlipidemia     Hypertension     Schizophrenia ([facility] [address]       Past Surgical History:   Procedure Laterality Date    BREAST SURGERY  10 yrs    lump right     SECTION      x 1    ELBOW SURGERY Left 2013    Deep hardware removal with ulnar nerve decompression    FASCIOTOMY Left 2013    L arm    OTHER SURGICAL HISTORY  3/19/2013    LEFT ELBOW ORIF WITH WOUND nervous/anxious and is hyperactive. Objective:      /69   Pulse 90   Temp 98.4 °F (36.9 °C) (Oral)   Resp 14   Ht 5' (1.524 m)   Wt 104 lb (47.2 kg)   SpO2 97%   BMI 20.31 kg/m²      Physical Exam  Vitals signs and nursing note reviewed. Cardiovascular:      Rate and Rhythm: Normal rate and regular rhythm. Pulses: Normal pulses. Heart sounds: Normal heart sounds. No murmur. Pulmonary:      Effort: Pulmonary effort is normal. No respiratory distress. Breath sounds: Normal breath sounds. Abdominal:      General: Abdomen is flat. Bowel sounds are normal. There is no distension. Palpations: Abdomen is soft. Tenderness: There is tenderness. Genitourinary:     Rectum: Guaiac result negative. External hemorrhoid present. No mass, tenderness or anal fissure. Normal anal tone. Comments: External hemorrhoid with external skin excoriation  Skin:     General: Skin is warm and dry. Capillary Refill: Capillary refill takes less than 2 seconds. Psychiatric:         Attention and Perception: Attention normal.         Mood and Affect: Mood is anxious. Affect is angry. Speech: Speech is rapid and pressured. Behavior: Behavior is agitated, aggressive and hyperactive. Thought Content: Thought content is paranoid. Thought content does not include homicidal or suicidal plan. Cognition and Memory: Cognition normal.         Judgment: Judgment normal.          Assessment/Plan:           1. Acute alcoholic gastritis with hemorrhage  appt made for her with Dr. Ramona Gray on Thursday January 16th, at 3;30 2020.  - Occult blood x 1, stool; Future  - CBC Auto Differential; Future    2. Dysthymia  Restart zoloft  - sertraline (ZOLOFT) 50 MG tablet; TAKE 1 TABLET BY MOUTH EVERY DAY  Dispense: 90 tablet; Refill: 4    3. Alcohol abuse  Declines alcohol abuse counseling  Does not want to quit drinking    4.  Decreased appetite  Megace to stimulate

## 2020-01-15 ENCOUNTER — HOSPITAL ENCOUNTER (OUTPATIENT)
Age: 60
Setting detail: SPECIMEN
Discharge: HOME | End: 2020-01-15
Payer: COMMERCIAL

## 2020-01-15 LAB — HEMOCCULT STL QL: NEGATIVE

## 2020-01-15 PROCEDURE — 82272 OCCULT BLD FECES 1-3 TESTS: CPT

## 2020-01-16 ENCOUNTER — TELEPHONE (OUTPATIENT)
Dept: FAMILY MEDICINE CLINIC | Age: 60
End: 2020-01-16

## 2020-01-20 ASSESSMENT — ENCOUNTER SYMPTOMS
SORE THROAT: 0
SINUS PRESSURE: 0
CHEST TIGHTNESS: 0
WHEEZING: 0
VOICE CHANGE: 0
RHINORRHEA: 0
TROUBLE SWALLOWING: 0
BACK PAIN: 0
COUGH: 0

## 2020-02-03 RX ORDER — LORATADINE 10 MG/1
TABLET ORAL
Qty: 30 TABLET | Refills: 5 | Status: SHIPPED | OUTPATIENT
Start: 2020-02-03 | End: 2020-08-03

## 2020-04-01 RX ORDER — FLUTICASONE PROPIONATE 50 MCG
2 SPRAY, SUSPENSION (ML) NASAL DAILY
Qty: 1 BOTTLE | Refills: 5 | Status: SHIPPED | OUTPATIENT
Start: 2020-04-01 | End: 2020-10-14 | Stop reason: SDUPTHER

## 2020-04-01 NOTE — TELEPHONE ENCOUNTER
Pharmacy called requesting a refill on the following medications:  Requested Prescriptions     Pending Prescriptions Disp Refills    fluticasone (FLONASE) 50 MCG/ACT nasal spray 1 Bottle 5     Pharmacy verified:  [email or web address]      Date of last visit:   Date of next visit (if applicable): 8/79/0836

## 2020-05-04 RX ORDER — LISINOPRIL AND HYDROCHLOROTHIAZIDE 12.5; 1 MG/1; MG/1
TABLET ORAL
Qty: 30 TABLET | Refills: 11 | Status: SHIPPED | OUTPATIENT
Start: 2020-05-04 | End: 2021-03-25

## 2020-05-26 RX ORDER — MEGESTROL ACETATE 20 MG/1
TABLET ORAL
Qty: 30 TABLET | Refills: 3 | Status: SHIPPED | OUTPATIENT
Start: 2020-05-26 | End: 2020-07-28 | Stop reason: SDUPTHER

## 2020-05-28 RX ORDER — SIMVASTATIN 40 MG
TABLET ORAL
Qty: 30 TABLET | Refills: 11 | Status: SHIPPED | OUTPATIENT
Start: 2020-05-28 | End: 2021-05-20

## 2020-06-30 ENCOUNTER — OFFICE VISIT (OUTPATIENT)
Dept: FAMILY MEDICINE CLINIC | Age: 60
End: 2020-06-30
Payer: COMMERCIAL

## 2020-06-30 VITALS
HEIGHT: 60 IN | WEIGHT: 106 LBS | HEART RATE: 88 BPM | TEMPERATURE: 98.6 F | BODY MASS INDEX: 20.81 KG/M2 | RESPIRATION RATE: 16 BRPM | OXYGEN SATURATION: 98 % | SYSTOLIC BLOOD PRESSURE: 112 MMHG | DIASTOLIC BLOOD PRESSURE: 68 MMHG

## 2020-06-30 PROCEDURE — G8420 CALC BMI NORM PARAMETERS: HCPCS | Performed by: NURSE PRACTITIONER

## 2020-06-30 PROCEDURE — 3017F COLORECTAL CA SCREEN DOC REV: CPT | Performed by: NURSE PRACTITIONER

## 2020-06-30 PROCEDURE — 99214 OFFICE O/P EST MOD 30 MIN: CPT | Performed by: NURSE PRACTITIONER

## 2020-06-30 PROCEDURE — G8427 DOCREV CUR MEDS BY ELIG CLIN: HCPCS | Performed by: NURSE PRACTITIONER

## 2020-06-30 PROCEDURE — 4004F PT TOBACCO SCREEN RCVD TLK: CPT | Performed by: NURSE PRACTITIONER

## 2020-06-30 RX ORDER — LAMOTRIGINE 25 MG/1
25 TABLET ORAL 2 TIMES DAILY
Qty: 60 TABLET | Refills: 3 | Status: SHIPPED | OUTPATIENT
Start: 2020-06-30 | End: 2020-11-11 | Stop reason: SDUPTHER

## 2020-06-30 NOTE — PROGRESS NOTES
(1.524 m)   Wt 106 lb (48.1 kg)   SpO2 98%   BMI 20.70 kg/m²   General Appearance: well developed and well- nourished, in no acute distress  Head: normocephalic and atraumatic  Pulmonary/Chest: clear to auscultation bilaterally- no wheezes, rales or rhonchi, normal air movement, no respiratory distress  Cardiovascular: normal rate, regular rhythm, normal S1 and S2, no murmurs, rubs, clicks, or gallops, Musculoskeletal: No joint swelling or gross deformity   Skin: warm and dry, no rash or erythema    Denies auditory  Hallucinations. Thinks sometimes she sees shawdows  Affect is appropriate. Mood is angry. She denies suicidal and homicidal thought, plan, or intent. She has good insight into current situation. Wants help, she wants to get better. ASSESSMENT & PLAN  Junior Yun was seen today for other and mood swings. Diagnoses and all orders for this visit:    Paranoia ([address]  -     lamoTRIgine (LAMICTAL) 25 MG tablet; Take 1 tablet by mouth 2 times daily  Continue with zoloft, add lamictal  Pt declines counseling or psychiatry referral  Bipolar disorder, current episode mixed, moderate (HCC)  -     lamoTRIgine (LAMICTAL) 25 MG tablet; Take 1 tablet by mouth 2 times daily    PTSD (post-traumatic stress disorder)  As above  Asked her to consider counseling at out next visit. Pt in agreement with plan. Return in about 4 weeks (around 7/28/2020) for f/u new med.

## 2020-07-07 RX ORDER — LANOLIN ALCOHOL/MO/W.PET/CERES
CREAM (GRAM) TOPICAL
Qty: 30 TABLET | Refills: 11 | Status: SHIPPED | OUTPATIENT
Start: 2020-07-07 | End: 2021-07-30

## 2020-07-22 ENCOUNTER — TELEPHONE (OUTPATIENT)
Dept: FAMILY MEDICINE CLINIC | Age: 60
End: 2020-07-22

## 2020-07-22 ENCOUNTER — HOSPITAL ENCOUNTER (OUTPATIENT)
Age: 60
Discharge: HOME | End: 2020-07-22
Payer: COMMERCIAL

## 2020-07-22 LAB
BASOPHILS # BLD: 1 %
BASOPHILS ABSOLUTE: 0 THOU/MM3 (ref 0–0.1)
EOSINOPHIL # BLD: 2 %
EOSINOPHILS ABSOLUTE: 0.1 THOU/MM3 (ref 0–0.4)
ERYTHROCYTE [DISTWIDTH] IN BLOOD BY AUTOMATED COUNT: 13.2 % (ref 11.5–14.5)
ERYTHROCYTE [DISTWIDTH] IN BLOOD BY AUTOMATED COUNT: 48.9 FL (ref 35–45)
HCT VFR BLD CALC: 43 % (ref 37–47)
HEMOGLOBIN: 14.9 GM/DL (ref 12–16)
IMMATURE GRANS (ABS): 0.04 THOU/MM3 (ref 0–0.07)
IMMATURE GRANULOCYTES: 0.8 %
LYMPHOCYTES # BLD: 26.8 %
LYMPHOCYTES ABSOLUTE: 1.3 THOU/MM3 (ref 1–4.8)
MCH RBC QN AUTO: 35.3 PG (ref 26–33)
MCHC RBC AUTO-ENTMCNC: 34.7 GM/DL (ref 32.2–35.5)
MCV RBC AUTO: 101.9 FL (ref 81–99)
MONOCYTES # BLD: 12 %
MONOCYTES ABSOLUTE: 0.6 THOU/MM3 (ref 0.4–1.3)
NUCLEATED RED BLOOD CELLS: 0 /100 WBC
PLATELET # BLD: 176 THOU/MM3 (ref 130–400)
PMV BLD AUTO: 9.3 FL (ref 9.4–12.4)
RBC # BLD: 4.22 MILL/MM3 (ref 4.2–5.4)
SEG NEUTROPHILS: 57.4 %
SEGMENTED NEUTROPHILS ABSOLUTE COUNT: 2.8 THOU/MM3 (ref 1.8–7.7)
WBC # BLD: 4.9 THOU/MM3 (ref 4.8–10.8)

## 2020-07-22 PROCEDURE — 85025 COMPLETE CBC W/AUTO DIFF WBC: CPT

## 2020-07-22 PROCEDURE — 36415 COLL VENOUS BLD VENIPUNCTURE: CPT

## 2020-07-22 RX ORDER — FOLIC ACID 1 MG/1
1 TABLET ORAL DAILY
Qty: 90 TABLET | Refills: 4 | Status: SHIPPED | OUTPATIENT
Start: 2020-07-22 | End: 2021-08-18 | Stop reason: SDUPTHER

## 2020-07-22 NOTE — TELEPHONE ENCOUNTER
----- Message from MELLY Fuentes CNP sent at 7/22/2020  2:07 PM EDT -----  Let pt know her labs look good. I am going to start her on folic acid in addition to her other medications.

## 2020-07-23 ENCOUNTER — HOSPITAL ENCOUNTER (OUTPATIENT)
Age: 60
Discharge: HOME | End: 2020-07-23
Payer: COMMERCIAL

## 2020-07-23 ENCOUNTER — TELEPHONE (OUTPATIENT)
Dept: FAMILY MEDICINE CLINIC | Age: 60
End: 2020-07-23

## 2020-07-23 NOTE — TELEPHONE ENCOUNTER
Pina with [address] lab called stating pt is there dropping off a stool sample. She state there is no active order in pt's chart for testing. Attempted to call pt to see what symptoms she is having that she would need testing for this. No answer, and no answering mach to leave a msg. Will try back at a later time. Pablito do you have any reason pt would need to give a sample? I am not finding any recent notes in chart for stool sample.

## 2020-07-23 NOTE — TELEPHONE ENCOUNTER
Lab states that order was already resulted, and they need a new order the same at that one if they are to be  checking for occult blood fecal.  Please advise.

## 2020-07-28 ENCOUNTER — OFFICE VISIT (OUTPATIENT)
Dept: FAMILY MEDICINE CLINIC | Age: 60
End: 2020-07-28
Payer: COMMERCIAL

## 2020-07-28 VITALS
HEIGHT: 61 IN | RESPIRATION RATE: 16 BRPM | SYSTOLIC BLOOD PRESSURE: 122 MMHG | WEIGHT: 104 LBS | TEMPERATURE: 98.3 F | HEART RATE: 83 BPM | BODY MASS INDEX: 19.63 KG/M2 | DIASTOLIC BLOOD PRESSURE: 78 MMHG

## 2020-07-28 PROCEDURE — 3017F COLORECTAL CA SCREEN DOC REV: CPT | Performed by: NURSE PRACTITIONER

## 2020-07-28 PROCEDURE — G8420 CALC BMI NORM PARAMETERS: HCPCS | Performed by: NURSE PRACTITIONER

## 2020-07-28 PROCEDURE — 99213 OFFICE O/P EST LOW 20 MIN: CPT | Performed by: NURSE PRACTITIONER

## 2020-07-28 PROCEDURE — G8427 DOCREV CUR MEDS BY ELIG CLIN: HCPCS | Performed by: NURSE PRACTITIONER

## 2020-07-28 PROCEDURE — 4004F PT TOBACCO SCREEN RCVD TLK: CPT | Performed by: NURSE PRACTITIONER

## 2020-07-28 RX ORDER — MEGESTROL ACETATE 20 MG/1
TABLET ORAL
Qty: 30 TABLET | Refills: 6 | Status: SHIPPED | OUTPATIENT
Start: 2020-07-28 | End: 2021-02-15 | Stop reason: SDUPTHER

## 2020-07-28 RX ORDER — PANTOPRAZOLE SODIUM 40 MG/1
TABLET, DELAYED RELEASE ORAL
Qty: 30 TABLET | Refills: 6 | Status: SHIPPED | OUTPATIENT
Start: 2020-07-28 | End: 2020-12-23

## 2020-07-28 ASSESSMENT — ENCOUNTER SYMPTOMS
ABDOMINAL DISTENTION: 0
ABDOMINAL PAIN: 0
RESPIRATORY NEGATIVE: 1

## 2020-07-28 NOTE — PROGRESS NOTES
[facility]  [address] [place] [address]  Dept: [phone]  Dept Fax: [phone]  Loc: Irnee Lloyd is a 60 y.o. Bunny Hale presents today for her medical conditions/complaints as noted below. Santiago Loyola c/o of Check-Up (4 week f/u after starting folic acid) and Results (discuss lab results)      HPI:      Pt here to follow up on new meds. She started lamictal at her last visit for her mood issues and paranoia and irritability. She does continue to take zoloft. She denies being depressed or down. Denies thoughts of hurting herself or others. Is sleeping well, still does not like to be around a lot of people. Declines a dose increase. GERD    HPI:     Symptoms:  belching and eructation, heartburn  Length of symptoms: 1 years  Current therapy and response:  Taking protonix but has been out, will restart  Recent change in symptoms? No  Symptoms associated with certain foods? Yes  Alcohol use? Yes - she does drink alcohol, taking folic aicd and MVI  Tobacco use? No    Abdominal Pain? No  Mid Back Pain? No  Melena?    No         Current Outpatient Medications   Medication Sig Dispense Refill    megestrol (MEGACE) 20 MG tablet TAKE 1 TABLET BY MOUTH EVERY DAY 30 tablet 6    pantoprazole (PROTONIX) 40 MG tablet 1 tablet daily 30 tablet 6    folic acid (FOLVITE) 1 MG tablet Take 1 tablet by mouth daily 90 tablet 4    thiamine 100 MG tablet TAKE 1 TABLET BY MOUTH EVERY DAY 30 tablet 11    lamoTRIgine (LAMICTAL) 25 MG tablet Take 1 tablet by mouth 2 times daily 60 tablet 3    simvastatin (ZOCOR) 40 MG tablet TAKE 1 TABLET BY MOUTH EVERY DAY AT NIGHT 30 tablet 11    lisinopril-hydroCHLOROthiazide (PRINZIDE;ZESTORETIC) 10-12.5 MG per tablet TAKE 1 TABLET BY MOUTH EVERY DAY 30 tablet 11    fluticasone (FLONASE) 50 MCG/ACT nasal spray 2 sprays by Nasal route daily 1 Bottle 5    loratadine (CLARITIN) 10 MG Topics    Alcohol use: Yes     Alcohol/week: 5.0 standard drinks     Types: 5 Glasses of wine per week     Comment: wine occasional        Allergies   Allergen Reactions    Pork-Derived Products Nausea Only       Health Maintenance   Topic Date Due    Shingles Vaccine (1 of 2) 12/25/2009    Lipid screen  07/10/2018    Flu vaccine (1) 09/01/2020    Cervical cancer screen  10/25/2020    Potassium monitoring  01/10/2021    Creatinine monitoring  01/10/2021    Breast cancer screen  01/23/2021    DTaP/Tdap/Td vaccine (2 - Td) 03/26/2023    Colon cancer screen colonoscopy  02/15/2029    Pneumococcal 0-64 years Vaccine  Completed    Hepatitis C screen  Addressed    HIV screen  Addressed    Hepatitis A vaccine  Aged Out    Hepatitis B vaccine  Aged Out    Hib vaccine  Aged Out    Meningococcal (ACWY) vaccine  Aged Out       Subjective:      Review of Systems   Respiratory: Negative. Cardiovascular: Negative. Gastrointestinal: Negative for abdominal distention and abdominal pain. Skin: Negative. Psychiatric/Behavioral: Positive for agitation. Negative for behavioral problems, decreased concentration, dysphoric mood, self-injury, sleep disturbance and suicidal ideas. The patient is nervous/anxious and is hyperactive. Objective:      /78   Pulse 83   Temp 98.3 °F (36.8 °C) (Oral)   Resp 16   Ht 5' 1\" (1.549 m)   Wt 104 lb (47.2 kg)   BMI 19.65 kg/m²      Physical Exam  Vitals signs and nursing note reviewed. Constitutional:       Appearance: She is not ill-appearing. Cardiovascular:      Rate and Rhythm: Normal rate and regular rhythm. Pulses: Normal pulses. Heart sounds: Normal heart sounds. No murmur. Abdominal:      General: Abdomen is flat. Bowel sounds are normal. There is no distension. Palpations: Abdomen is soft. Tenderness: There is no abdominal tenderness. Neurological:      Mental Status: She is alert.    Psychiatric:         Attention and Perception: Attention normal.         Mood and Affect: Mood is anxious. Speech: Speech is rapid and pressured. Behavior: Behavior is aggressive. Thought Content: Thought content is paranoid. Thought content does not include homicidal or suicidal plan. Cognition and Memory: Cognition normal.         Judgment: Judgment normal.          Assessment/Plan:           1. Bipolar disorder, current episode mixed, moderate ([address]  Controlled  Continue current meds  [t denies a dose increase    2. Paranoia ([address]  A above    3. Gastroesophageal reflux disease, esophagitis presence not specified  gerd diet  Continue current hterapy  - pantoprazole (PROTONIX) 40 MG tablet; 1 tablet daily  Dispense: 30 tablet; Refill: 6      Return in about 3 months (around 10/28/2020) for f/u in office. Reccommended tobaccocessation options including pharmacologic methods, counseled great than 3 minutesduring this visit:  Yes[]  No  []       Patient given educational materials -see patient instructions. Discussed use, benefit, and side effects of prescribedmedications. All patient questions answered. Pt voiced understanding. Reviewedhealth maintenance. Instructed to continue current medications, diet and exercise. Patient agreed with treatment plan. Follow up as directed.        Electronicallysigned by MELLY Gonzalez CNP on 7/28/2020 at 12:33 PM

## 2020-10-14 RX ORDER — FLUTICASONE PROPIONATE 50 MCG
2 SPRAY, SUSPENSION (ML) NASAL DAILY
Qty: 1 BOTTLE | Refills: 5 | Status: SHIPPED | OUTPATIENT
Start: 2020-10-14

## 2020-10-14 NOTE — TELEPHONE ENCOUNTER
Please see the attached scanned fax requesting new rx for the following  Please approve or refuse Rx request:  Requested Prescriptions     Pending Prescriptions Disp Refills    fluticasone (FLONASE) 50 MCG/ACT nasal spray 1 Bottle 5     Si sprays by Nasal route daily       Next appointment:  10/28/2020

## 2020-10-28 ENCOUNTER — TELEPHONE (OUTPATIENT)
Dept: FAMILY MEDICINE CLINIC | Age: 60
End: 2020-10-28

## 2020-10-28 NOTE — TELEPHONE ENCOUNTER
Pt scheduled today 10/28/20 at 1:00 with Dylon Douglas. Pablito requested that I call pt to see if this time still worked for her. Pt states she will be coming to appt today.

## 2020-11-11 ENCOUNTER — OFFICE VISIT (OUTPATIENT)
Dept: FAMILY MEDICINE CLINIC | Age: 60
End: 2020-11-11
Payer: COMMERCIAL

## 2020-11-11 VITALS
TEMPERATURE: 97.4 F | BODY MASS INDEX: 20.62 KG/M2 | WEIGHT: 109.2 LBS | HEIGHT: 61 IN | SYSTOLIC BLOOD PRESSURE: 108 MMHG | HEART RATE: 74 BPM | DIASTOLIC BLOOD PRESSURE: 72 MMHG | RESPIRATION RATE: 12 BRPM | OXYGEN SATURATION: 98 %

## 2020-11-11 PROBLEM — R06.02 SOB (SHORTNESS OF BREATH) ON EXERTION: Status: RESOLVED | Noted: 2018-12-18 | Resolved: 2020-11-11

## 2020-11-11 PROBLEM — R07.89 CHEST PAIN, ATYPICAL: Status: RESOLVED | Noted: 2018-12-18 | Resolved: 2020-11-11

## 2020-11-11 PROCEDURE — G8427 DOCREV CUR MEDS BY ELIG CLIN: HCPCS | Performed by: NURSE PRACTITIONER

## 2020-11-11 PROCEDURE — 4004F PT TOBACCO SCREEN RCVD TLK: CPT | Performed by: NURSE PRACTITIONER

## 2020-11-11 PROCEDURE — 90471 IMMUNIZATION ADMIN: CPT | Performed by: NURSE PRACTITIONER

## 2020-11-11 PROCEDURE — 3017F COLORECTAL CA SCREEN DOC REV: CPT | Performed by: NURSE PRACTITIONER

## 2020-11-11 PROCEDURE — 90686 IIV4 VACC NO PRSV 0.5 ML IM: CPT | Performed by: NURSE PRACTITIONER

## 2020-11-11 PROCEDURE — 99214 OFFICE O/P EST MOD 30 MIN: CPT | Performed by: NURSE PRACTITIONER

## 2020-11-11 PROCEDURE — G8482 FLU IMMUNIZE ORDER/ADMIN: HCPCS | Performed by: NURSE PRACTITIONER

## 2020-11-11 PROCEDURE — G8420 CALC BMI NORM PARAMETERS: HCPCS | Performed by: NURSE PRACTITIONER

## 2020-11-11 RX ORDER — LAMOTRIGINE 25 MG/1
50 TABLET ORAL 2 TIMES DAILY
Qty: 120 TABLET | Refills: 3 | Status: SHIPPED | OUTPATIENT
Start: 2020-11-11 | End: 2021-02-22

## 2020-11-11 ASSESSMENT — ENCOUNTER SYMPTOMS: RESPIRATORY NEGATIVE: 1

## 2020-11-11 NOTE — PROGRESS NOTES
Vaccine Information Sheet, \"Influenza - Inactivated\"  given to Thomas Dover, or parent/legal guardian of  Thomas Dover and verbalized understanding. Patient responses:    Have you ever had a reaction to a flu vaccine? No  Do you have an allergy to eggs, neomycin or polymixin? No  Do you have an allergy to Thimerosal, contact lens solution, or Merthiolate? No  Have you ever had [place] Syndrome? No  Do you have any current illness? No  Do you have a temperature above 100 degrees? No  Are you pregnant? No  If pregnant, permission obtained from physician? No  Do you have an active neurological disorder? No      Flu vaccine given per order. Please see immunization tab.     Immunization(s) given during visit:    Immunizations Administered     Name Date Dose Route    Influenza, Quadv, IM, PF (6 mo and older Fluzone, Flulaval, Fluarix, and 3 yrs and older Afluria) 11/11/2020 0.5 mL Intramuscular    Site: Deltoid- Right    Lot: [id number]    NDC: 70647-769-11          Most recent Vaccine Information Sheet dated 8/15/19 given to pt

## 2020-11-11 NOTE — PROGRESS NOTES
[facility]  [address] [place] [facility] [place] [address]  Dept: [phone]  Dept Fax: [phone]  Loc: Kirsty Randolph is a 60 y.o. Ghulam Allegheny presents today for her medical conditions/complaints as noted below. Santiago Loyola c/o of Follow-up (Pt presents for a 3 month f/u for chronic conditions. Pt states she has been doing ok. Her hand is still numb.)      HPI:      Pt here to discuss her med increases at her last visit. She was diagnosed with bipolar disorder and added lamictal 25 mg bid to her daily regimen. She states she has noticed her moods have improved since doing this, she does still get irritable and does not be around people, has claustrophobia and can't be in closed rooms. Will increase lamictal to 50 mg bid. Andrew thoughts of hurting herself or others. We also added megace at her last visit to increase her appetite states working well has gained 5 pounds appetite increased.      Lab Results       Component                Value               Date                       CHOL                     194                 07/10/2017                 CHOL                     213 (H)             06/30/2017                 CHOL                     246 (H)             05/23/2016            Lab Results       Component                Value               Date                       TRIG                     297 (H)             07/10/2017                 TRIG                     254 (H)             06/30/2017                 TRIG                     136                 05/23/2016            Lab Results       Component                Value               Date                       HDL                      82                  07/10/2017                 HDL                      69                  06/30/2017                 HDL                      102                 05/23/2016            Lab Results       Component                Value Date                       LDLCALC                  53                  07/10/2017                 LDLCALC                  93                  06/30/2017                 LDLCALC                  117                 05/23/2016            No results found for: LABVLDL, VLDL  No results found for: ARACELI  Continues to take her cholesterol meds. Current Outpatient Medications   Medication Sig Dispense Refill    lamoTRIgine (LAMICTAL) 25 MG tablet Take 2 tablets by mouth 2 times daily 120 tablet 3    fluticasone (FLONASE) 50 MCG/ACT nasal spray 2 sprays by Nasal route daily 1 Bottle 5    loratadine (CLARITIN) 10 MG tablet TAKE 1 TABLET BY MOUTH EVERY DAY 90 tablet 3    megestrol (MEGACE) 20 MG tablet TAKE 1 TABLET BY MOUTH EVERY DAY 30 tablet 6    pantoprazole (PROTONIX) 40 MG tablet 1 tablet daily 30 tablet 6    folic acid (FOLVITE) 1 MG tablet Take 1 tablet by mouth daily 90 tablet 4    thiamine 100 MG tablet TAKE 1 TABLET BY MOUTH EVERY DAY 30 tablet 11    simvastatin (ZOCOR) 40 MG tablet TAKE 1 TABLET BY MOUTH EVERY DAY AT NIGHT 30 tablet 11    lisinopril-hydroCHLOROthiazide (PRINZIDE;ZESTORETIC) 10-12.5 MG per tablet TAKE 1 TABLET BY MOUTH EVERY DAY 30 tablet 11    sertraline (ZOLOFT) 50 MG tablet TAKE 1 TABLET BY MOUTH EVERY DAY 90 tablet 4    sucralfate (CARAFATE) 1 GM tablet 1 tablet after meals and at bedtime 60 tablet 0    ASPIRIN LOW DOSE 81 MG EC tablet TAKE 1 TABLET BY MOUTH DAILY 90 tablet 3    albuterol sulfate HFA (PROVENTIL HFA) 108 (90 Base) MCG/ACT inhaler Inhale 2 puffs into the lungs every 4 hours as needed for Wheezing or Shortness of Breath (Space out to every 6 hours as symptoms improve) Space out to every 6 hours as symptoms improve. 1 Inhaler 0     No current facility-administered medications for this visit.            Past Medical History:   Diagnosis Date    Anxiety     Asthma     B[facility] disorder ([facility]     Blood clotting disorder ([facility] feb 2014    was Subjective:      Review of Systems   Constitutional: Negative for chills, fatigue and fever. Respiratory: Negative. Cardiovascular: Negative. Skin: Negative. Neurological: Negative for dizziness and headaches. Psychiatric/Behavioral: Positive for agitation, behavioral problems, decreased concentration and sleep disturbance. Negative for dysphoric mood, hallucinations, self-injury and suicidal ideas. The patient is nervous/anxious and is hyperactive. Objective:      /72   Pulse 74   Temp 97.4 °F (36.3 °C)   Resp 12   Ht 5' 1\" (1.549 m)   Wt 109 lb 3.2 oz (49.5 kg)   SpO2 98%   BMI 20.63 kg/m²      Physical Exam  Vitals signs and nursing note reviewed. Constitutional:       Appearance: She is not ill-appearing. Cardiovascular:      Rate and Rhythm: Normal rate and regular rhythm. Pulses: Normal pulses. Heart sounds: Normal heart sounds. No murmur. Pulmonary:      Effort: Pulmonary effort is normal. No respiratory distress. Breath sounds: Normal breath sounds. No wheezing. Neurological:      Mental Status: She is alert. Psychiatric:         Attention and Perception: Attention normal.         Mood and Affect: Mood is anxious. Affect is labile, angry and inappropriate. Speech: Speech is rapid and pressured. Behavior: Behavior is aggressive. Thought Content: Thought content is paranoid. Judgment: Judgment is impulsive. Assessment/Plan:           1. Bipolar disorder, current episode mixed, moderate (HCC)  Increase lamictal to 50 mg bid   - lamoTRIgine (LAMICTAL) 25 MG tablet; Take 2 tablets by mouth 2 times daily  Dispense: 120 tablet; Refill: 3    2. Flu vaccine need    - INFLUENZA, QUADV, 3 YRS AND OLDER, IM PF, PREFILL SYR OR SDV, 0.5ML (AFLURIA QUADV, PF)    3. Paranoia (HCC)  Increase lamictal to 50 mg bid  - lamoTRIgine (LAMICTAL) 25 MG tablet; Take 2 tablets by mouth 2 times daily  Dispense: 120 tablet;  Refill: 3    4. Mixed hyperlipidemia  Await new labs  Low fat low cholesterol diet   - Lipid Panel; Future    Pt in agreement with plan. Return in about 3 months (around 2/11/2021) for pap test and check up. Reccommended tobaccocessation options including pharmacologic methods, counseled great than 3 minutesduring this visit:  Yes[]  No  []       Patient given educational materials -see patient instructions. Discussed use, benefit, and side effects of prescribedmedications. All patient questions answered. Pt voiced understanding. Reviewedhealth maintenance. Instructed to continue current medications, diet and exercise. Patient agreed with treatment plan. Follow up as directed.        Electronicallysigned by MELLY Nichols CNP on 11/11/2020 at 9:18 AM

## 2020-12-23 RX ORDER — PANTOPRAZOLE SODIUM 40 MG/1
TABLET, DELAYED RELEASE ORAL
Qty: 90 TABLET | Refills: 2 | Status: SHIPPED | OUTPATIENT
Start: 2020-12-23 | End: 2021-08-18 | Stop reason: SDUPTHER

## 2021-02-12 ENCOUNTER — NURSE TRIAGE (OUTPATIENT)
Dept: OTHER | Facility: CLINIC | Age: 61
End: 2021-02-12

## 2021-02-12 ENCOUNTER — VIRTUAL VISIT (OUTPATIENT)
Dept: FAMILY MEDICINE CLINIC | Age: 61
End: 2021-02-12
Payer: COMMERCIAL

## 2021-02-12 DIAGNOSIS — F41.9 ANXIETY: ICD-10-CM

## 2021-02-12 DIAGNOSIS — A08.4 VIRAL GASTROENTERITIS: Primary | ICD-10-CM

## 2021-02-12 DIAGNOSIS — F31.62 BIPOLAR DISORDER, CURRENT EPISODE MIXED, MODERATE (HCC): ICD-10-CM

## 2021-02-12 PROCEDURE — 99214 OFFICE O/P EST MOD 30 MIN: CPT | Performed by: NURSE PRACTITIONER

## 2021-02-12 RX ORDER — ONDANSETRON 4 MG/1
4 TABLET, FILM COATED ORAL 3 TIMES DAILY PRN
Qty: 30 TABLET | Refills: 0 | Status: SHIPPED | OUTPATIENT
Start: 2021-02-12 | End: 2022-01-10

## 2021-02-12 ASSESSMENT — ENCOUNTER SYMPTOMS
CONSTIPATION: 0
RESPIRATORY NEGATIVE: 1
ABDOMINAL DISTENTION: 0
DIARRHEA: 0
VOMITING: 1
ABDOMINAL PAIN: 0
NAUSEA: 1

## 2021-02-12 NOTE — ASSESSMENT & PLAN NOTE
Pt was recently started on zoloft for anxiety, pt having issues with her family and having some anger issues, will continue on zoloft for now may need to add a mood stabilize in future.

## 2021-02-12 NOTE — PROGRESS NOTES
Omid Rincon (:  1960) is a 63 y.o. female,Established patient, here for evaluation of the following chief complaint(s): Nausea & Vomiting and Generalized Body German Donis is a 63 y.o. female evaluated via telephone on 2021. Consent:  She and/or health care decision maker is aware that that she may receive a bill for this telephone service, depending on her insurance coverage, and has provided verbal consent to proceed: Yes      Documentation:  I communicated with the patient and/or health care decision maker about her N/V and body aches. Details of this discussion including any medical advice provided: We discussed her symptoms of N/V and body aches. She states her symptoms started 4 days ago and is slowly improving. States she had been vomiting and today was able to keep food down. States she has not jarad around anyone that is sick. Had chills but did not take her temp, denies abdominal pain or diarrhea. I did discuss covid testing with her but she declines, and refuses will not get a covid test. Advised ot advance diet slowly and take tylenol if needed. Advised sounds as though could be viral gastroenteritis, advised unlikely coming from the zoloft. States she has continued the zoloft      Has been diagnosed with bipolar in past and does drink alcohol. Recently started on zoloft for worsening family anxiety and stress, pt having some mood swings and angry and irritable. Denies thoughts of hurting herself or others. Anxiety     HPI:    Inciting events or triggers for anxiety - everyday life events   Frequency of anxiety - daily  Panic attacks? Yes  Symptoms of panic attacks -  chest pain, difficulty concentrating and racing thoughts  Sleep Disturbances? No  Impaired concentration? No  Substance abuse? She does drink alcohol but states she is cutting back advised not to drink alcohol iwht zoloft  Suicidal/Homicidal Ideation?  No    Compliant with meds: yes Med side effects: as above but unlikely symptoms coming from zoloft. Sees therapist?: No  Family History of Mental Illness? Yes      I affirm this is a Patient Initiated Episode with a Patient who has not had a related appointment within my department in the past 7 days or scheduled within the next 24 hours. Patient identification was verified at the start of the visit: Yes    Total Time: minutes: 21-30 minutes    Note: not billable if this call serves to triage the patient into an appointment for the relevant concern      Zahida Coughlin   ASSESSMENT/PLAN:  1. Viral gastroenteritis  Advance diet slowly  Call if any symptom changes  Likely viral in nature  zofran ordered   2. Bipolar disorder, current episode mixed, moderate (HCC)  Assessment & Plan:  Pt was recently started on zoloft for anxiety, pt having issues with her family and having some anger issues, will continue on zoloft for now may need to add a mood stabilize in future. 3. Anxiety  #2    No follow-ups on file. SUBJECTIVE/OBJECTIVE:  HPI    Review of Systems   Constitutional: Positive for appetite change (decreased), chills and fatigue. Negative for unexpected weight change. HENT: Negative for congestion. Respiratory: Negative. Cardiovascular: Negative. Gastrointestinal: Positive for nausea and vomiting. Negative for abdominal distention, abdominal pain, constipation and diarrhea. Psychiatric/Behavioral: Positive for agitation. Negative for behavioral problems, decreased concentration, dysphoric mood, sleep disturbance and suicidal ideas. The patient is nervous/anxious and is hyperactive. No flowsheet data found.      Physical Exam    [INSTRUCTIONS:  \"[x]\" Indicates a positive item  \"[]\" Indicates a negative item  -- DELETE ALL ITEMS NOT EXAMINED]    Constitutional: [] Appears well-developed and well-nourished [] No apparent distress      [] Abnormal - Mental status: [x] Alert and awake  [x] Oriented to person/place/time [x] Able to follow commands    [] Abnormal -     Eyes:   EOM    []  Normal    [] Abnormal -   Sclera  []  Normal    [] Abnormal -          Discharge []  None visible   [] Abnormal -     HENT: [] Normocephalic, atraumatic  [] Abnormal -   [] Mouth/Throat: Mucous membranes are moist    External Ears [] Normal  [] Abnormal -    Neck: [] No visualized mass [] Abnormal -     Pulmonary/Chest: [x] Respiratory effort normal   [] No visualized signs of difficulty breathing or respiratory distress        [] Abnormal -      Musculoskeletal:   [] Normal gait with no signs of ataxia         [] Normal range of motion of neck        [] Abnormal -     Neurological:        [] No Facial Asymmetry (Cranial nerve 7 motor function) (limited exam due to video visit)          [] No gaze palsy        [] Abnormal -          Skin:        [x] No significant exanthematous lesions or discoloration noted on facial skin  , per pt        [] Abnormal -            Psychiatric:       [x] Normal Affect [] Abnormal -        [x] No Hallucinations    Other pertinent observable physical exam findings:-          On this date 02/12/21 I have spent 30 minutes reviewing previous notes, test results and face to face (virtual) with the patient discussing the diagnosis and importance of compliance with the treatment plan as well as documenting on the day of the visit. Francisco Nelson is a 63 y.o. female being evaluated by a Virtual Visit (video visit) encounter to address concerns as mentioned above. A caregiver was present when appropriate. Due to this being a TeleHealth encounter (During XNVBV-23 public health emergency), evaluation of the following organ systems was limited: Vitals/Constitutional/EENT/Resp/CV/GI//MS/Neuro/Skin/Heme-Lymph-Imm. Pursuant to the emergency declaration under the [address] [place] [address] and the [employer] Act, this Virtual Visit was conducted with patient's (and/or legal guardian's) consent, to reduce the patient's risk of exposure to COVID-19 and provide necessary medical care. The patient (and/or legal guardian) has also been advised to contact this office for worsening conditions or problems, and seek emergency medical treatment and/or call 911 if deemed necessary. Patient identification was verified at the start of the visit: Yes    Services were provided through a video synchronous discussion virtually to substitute for in-person clinic visit. Patient was located at home and provider was located in office or at home. An electronic signature was used to authenticate this note.     --Eliud Sierra, MELLY - CNP

## 2021-02-12 NOTE — TELEPHONE ENCOUNTER
Patient called Rosa Isela Mandel at [facility]  with red flag complaint. Brief description of triage:  Patient calling for concerns about nausea and vomiting over the past couple days. Triage indicates for patient to see today or tomorrow in office. Care advice provided, patient verbalizes understanding; denies any other questions or concerns; instructed to call back for any new or worsening symptoms. Writer attempted to provide a warm transfer to [facility] for appointment scheduling, but patient disconnected from the line before transfer. Aline will call patient back for scheduling. Attention Provider: Thank you for allowing me to participate in the care of your patient. The patient was connected to triage in response to information provided to the ECC. Please do not respond through this encounter as the response is not directed to a shared pool. Reason for Disposition   Patient wants to be seen    Answer Assessment - Initial Assessment Questions  1. NAUSEA SEVERITY: \"How bad is the nausea? \" (e.g., mild, moderate, severe; dehydration, weight loss)    - MILD: loss of appetite without change in eating habits    - MODERATE: decreased oral intake without significant weight loss, dehydration, or malnutrition    - SEVERE: inadequate caloric or fluid intake, significant weight loss, symptoms of dehydration      Moderate, but today she is eating and drinking    2. ONSET: \"When did the nausea begin? \"      Tuesday    3. VOMITING: \"Any vomiting? \" If so, ask: \"How many times today? \"      Yesterday, x 3    4. RECURRENT SYMPTOM: \"Have you had nausea before? \" If so, ask: \"When was the last time? \" \"What happened that time? \"      No    5. CAUSE: \"What do you think is causing the nausea? \"      Unsure, states she started a new medication this week written by Guero Barber. Unable to see any new medications in Epic prescribed by PCP.   Patient is unable to tell writer what medication it was.    6. PREGNANCY: \"Is there any chance you are pregnant? \" (e.g., unprotected intercourse, missed birth control pill, broken condom)      N/a    Protocols used: NAUSEA-ADULT-[place]

## 2021-02-15 ENCOUNTER — OFFICE VISIT (OUTPATIENT)
Dept: FAMILY MEDICINE CLINIC | Age: 61
End: 2021-02-15
Payer: COMMERCIAL

## 2021-02-15 VITALS
TEMPERATURE: 98.2 F | DIASTOLIC BLOOD PRESSURE: 62 MMHG | WEIGHT: 110 LBS | RESPIRATION RATE: 10 BRPM | BODY MASS INDEX: 20.24 KG/M2 | HEIGHT: 62 IN | HEART RATE: 72 BPM | SYSTOLIC BLOOD PRESSURE: 108 MMHG

## 2021-02-15 DIAGNOSIS — Z12.31 ENCOUNTER FOR SCREENING MAMMOGRAM FOR MALIGNANT NEOPLASM OF BREAST: Primary | ICD-10-CM

## 2021-02-15 DIAGNOSIS — F31.77 BIPOLAR DISORDER, IN PARTIAL REMISSION, MOST RECENT EPISODE MIXED (HCC): ICD-10-CM

## 2021-02-15 DIAGNOSIS — Z12.4 ENCOUNTER FOR PAPANICOLAOU SMEAR FOR CERVICAL CANCER SCREENING: ICD-10-CM

## 2021-02-15 PROCEDURE — 99213 OFFICE O/P EST LOW 20 MIN: CPT | Performed by: NURSE PRACTITIONER

## 2021-02-15 PROCEDURE — G8482 FLU IMMUNIZE ORDER/ADMIN: HCPCS | Performed by: NURSE PRACTITIONER

## 2021-02-15 PROCEDURE — 3017F COLORECTAL CA SCREEN DOC REV: CPT | Performed by: NURSE PRACTITIONER

## 2021-02-15 PROCEDURE — G8427 DOCREV CUR MEDS BY ELIG CLIN: HCPCS | Performed by: NURSE PRACTITIONER

## 2021-02-15 PROCEDURE — 99396 PREV VISIT EST AGE 40-64: CPT | Performed by: NURSE PRACTITIONER

## 2021-02-15 PROCEDURE — G8420 CALC BMI NORM PARAMETERS: HCPCS | Performed by: NURSE PRACTITIONER

## 2021-02-15 PROCEDURE — 4004F PT TOBACCO SCREEN RCVD TLK: CPT | Performed by: NURSE PRACTITIONER

## 2021-02-15 RX ORDER — MEGESTROL ACETATE 20 MG/1
TABLET ORAL
Qty: 30 TABLET | Refills: 6 | Status: SHIPPED | OUTPATIENT
Start: 2021-02-15 | End: 2021-08-18 | Stop reason: SDUPTHER

## 2021-02-15 RX ORDER — GAUZE BANDAGE 2" X 2"
BANDAGE TOPICAL
COMMUNITY
Start: 2020-12-20 | End: 2022-07-25 | Stop reason: SDUPTHER

## 2021-02-15 NOTE — PROGRESS NOTES
SUBJECTIVE:  Becky Gonzalez is a 63 y.o. female for   Chief Complaint   Patient presents with    Gynecologic Exam    Flank Pain     pt states she was wrestling with her grandson and is having flake pain on the left side, pt states it she turns it will pop. Taking megace to gain weight appetite has been increased. HPI:    Depressed Mood? yes - but better with the zoloft  Anhedonia?  no  Appetite changes? yes - appetite is improving has gained a few pounds since her last visit   Sleep disturbances? no  Feelings of guilt? no  Decreased energy? no  Impaired concentration? no  Substance abuse? yes - she does admit to some alcohol use     Suicidal/Homicidal Ideation? no      Compliant with meds: Yes  Med side effects: yes had some nausea and vomiting in the beginning but better now    Sees therapist?:  no  Family History of Mental Illness? yes -     Psychological ROS: negative for - suicidal ideation    States since she started taking the zoloft and lamictal her moods have calmed down does not get as irritated as easily or angry not as excited and anxious. Working better with family members. OBJECTIVE:    /62   Pulse 72   Temp 98.2 °F (36.8 °C) (Oral)   Resp 10   Ht 5' 2.21\" (1.58 m)   Wt 110 lb (49.9 kg)   BMI 19.99 kg/m²   Skin: warm and dry, no rash or erythema    Denies auditory or visual hallucinations. Affect is appropriate. Mood is congruent. She denies suicidal and homicidal thought, plan, or intent. She has good insight into current situation. ASSESSMENT & PLAN  Ayaka Avila was seen today for gynecologic exam and flank pain. Diagnoses and all orders for this visit:      Bipolar disorder, in partial remission, most recent episode mixed ([address]  Stable controlled with meds zoloft and lamicta    Other orders  -     megestrol (MEGACE) 20 MG tablet; TAKE 1 TABLET BY MOUTH EVERY DAY        Return in about 6 months (around 8/15/2021) for check up.

## 2021-02-15 NOTE — PROGRESS NOTES
Francisco Nelson is a 63 y.o. female for   Chief Complaint   Patient presents with    Gynecologic Exam    Flank Pain     pt states she was wrestling with her grandson and is having flake pain on the left side, pt states it she turns it will pop. HPI:    No LMP recorded. Patient is postmenopausal.      Patient Gynecological History No Yes Not Asked comment          History of Abnormal Pap []                     [x]                     []                  Last pap: 2019   Has received HPV vaccine [x]                     []                     []                     Currently sexually active []                     [x]                     []                  post menopausal status   History of Sexually Transmitted Disease []            [x]                     []                     History of Abnormal Mammograms [x]            []                     []                   Last mammo: over a year ago      Last Dexa Scan: has not had yet  Last Colonoscopy: normal    Urinary Incontinence? No  Dysuria? No  Vaginal itching or Dryness? No  Vaginal discharge? No    Social History     Socioeconomic History    Marital status: Single     Spouse name: Not on file    Number of children: 3    Years of education: 6    Highest education level: Not on file   Occupational History    Occupation: SSI   Social Needs    Financial resource strain: Not on file    Food insecurity     Worry: Not on file     Inability: Not on file   [employer] needs     Medical: Not on file     Non-medical: Not on file   Tobacco Use    Smoking status: Current Some Day Smoker     Packs/day: 0.00     Years: 35.00     Pack years: 0.00     Types: Cigarettes    Smokeless tobacco: Never Used   Substance and Sexual Activity    Alcohol use:  Yes     Alcohol/week: 5.0 standard drinks     Types: 5 Glasses of wine per week     Comment: wine occasional    Drug use: No    Sexual activity: Yes     Partners: Male   Lifestyle    Physical activity Days per week: Not on file     Minutes per session: Not on file    Stress: Not on file   Relationships    Social connections     Talks on phone: Not on file     Gets together: Not on file     Attends Druze service: Not on file     Active member of club or organization: Not on file     Attends meetings of clubs or organizations: Not on file     Relationship status: Not on file    Intimate partner violence     Fear of current or ex partner: Not on file     Emotionally abused: Not on file     Physically abused: Not on file     Forced sexual activity: Not on file   Other Topics Concern    Not on file   Social History Narrative    Not on file     Social History     Substance and Sexual Activity   Sexual Activity Yes    Partners: Male       OBJECTIVE:  /62   Pulse 72   Temp 98.2 °F (36.8 °C) (Oral)   Resp 10   Ht 5' 2.21\" (1.58 m)   Wt 110 lb (49.9 kg)   BMI 19.99 kg/m²   Physical Examination: General appearance - alert, well appearing, and in no distress  Chest - clear to auscultation, no wheezes, rales or rhonchi, symmetric air entry  Heart - normal rate, regular rhythm, normal S1, S2, no murmurs, rubs, clicks or gallops  Abdomen - soft, nontender, nondistended, no masses or organomegaly  Extremities - peripheral pulses normal, no pedal edema, no clubbing or cyanosis  Psych:  Affect appropriate. Thought process is normal without evidence of depression or psychosis. Good insight and appropriae interaction. Cognition and memory appear to be intact.   Breasts - breasts appear normal, no suspicious masses, no skin or nipple changes or axillary nodes, no axillary lymphadenopathy, risk and benefit of breast self-exam was discussed  Pelvic - normal external genitalia, vulva, vagina, cervix, uterus and adnexa, VULVA: normal appearing vulva with no masses, tenderness or lesions, VAGINA: normal appearing vagina with normal color and discharge, no lesions, PELVIC FLOOR EXAM: no cystocele, rectocele or prolapse noted, CERVIX: normal appearing cervix without discharge or lesions, UTERUS: uterus is normal size, shape, consistency and nontender, ADNEXA: normal adnexa in size, nontender and no masses, no masses, RECTAL: normal rectal, no masses, guaiac negative stool obtained, external hemorrhoids non-thrombosed, normal sphincter tone, PAP: Pap smear done today      ASSESSMENT & PLAN  July Mcgrath was seen today for gynecologic exam and flank pain. Diagnoses and all orders for this visit:    Encounter for screening mammogram for malignant neoplasm of breast  -     DEBRA DIGITAL SCREEN W OR WO CAD BILATERAL; Future  -     PAP SMEAR    Encounter for Papanicolaou smear for cervical cancer screening  -     PAP SMEAR    Await pap smear results. Pt in agreement with plan     Return in about 6 months (around 8/15/2021) for check up. Follow-up: Will notify patient of pap smear results by phone. Return PRN or 1 year for annual well woman exam.    Counseling was provided today regarding the following topics: Healthy eating habits, Regular exercise, substance abuse and healthy sleep habits. Breast CA Risk Assessment Calculator - https://www.tita.org/  FRAX Calculator - MapSeats.co.uk  Pap Guidelines - [employer].co.za. 13.pdf    Santiago received counseling on the following healthy behaviors: medication adherence  Reviewed prior labs and health maintenance. Continue current medications, diet and exercise. Discussed use, benefit, and side effects of prescribed medications. Barriers to medication compliance addressed. Patient given educational materials - see patient instructions. All patient questions answered. Patient voiced understanding.

## 2021-02-20 DIAGNOSIS — F31.62 BIPOLAR DISORDER, CURRENT EPISODE MIXED, MODERATE (HCC): ICD-10-CM

## 2021-02-20 DIAGNOSIS — F22 PARANOIA (HCC): ICD-10-CM

## 2021-02-22 RX ORDER — LAMOTRIGINE 25 MG/1
TABLET ORAL
Qty: 120 TABLET | Refills: 3 | Status: SHIPPED | OUTPATIENT
Start: 2021-02-22 | End: 2021-07-12

## 2021-02-25 ENCOUNTER — HOSPITAL ENCOUNTER (OUTPATIENT)
Dept: WOMENS IMAGING | Age: 61
Discharge: HOME | End: 2021-02-25
Payer: COMMERCIAL

## 2021-02-25 DIAGNOSIS — Z12.31 ENCOUNTER FOR SCREENING MAMMOGRAM FOR MALIGNANT NEOPLASM OF BREAST: ICD-10-CM

## 2021-02-25 PROCEDURE — 77067 SCR MAMMO BI INCL CAD: CPT

## 2021-02-26 ENCOUNTER — TELEPHONE (OUTPATIENT)
Dept: FAMILY MEDICINE CLINIC | Age: 61
End: 2021-02-26

## 2021-02-26 NOTE — TELEPHONE ENCOUNTER
----- Message from Eileen Kaur DO sent at 2/26/2021  7:51 AM EST -----  Please let pt know that mammogram is normal. Let me know if questions, thanks!

## 2021-03-01 ENCOUNTER — TELEPHONE (OUTPATIENT)
Dept: FAMILY MEDICINE CLINIC | Age: 61
End: 2021-03-01

## 2021-03-01 LAB — CYTOLOGY THIN PREP PAP: NORMAL

## 2021-03-01 NOTE — TELEPHONE ENCOUNTER
----- Message from MELLY Sanchez CNP sent at 3/1/2021 10:27 AM EST -----  Let Gilda Varma know her PAP test was normal.

## 2021-03-04 NOTE — PATIENT INSTRUCTIONS
You may receive a survey about your visit with us today. The feedback from our patients helps us identify what is working well and where the service to all patients can be enhanced. Thank you! Initiate Treatment: Sarita Frazier Continue Regimen: Moisturizer daily. Detail Level: Zone Initiate Treatment: Hibiscus solution use daily to affected areas on feet

## 2021-03-25 RX ORDER — LISINOPRIL AND HYDROCHLOROTHIAZIDE 12.5; 1 MG/1; MG/1
TABLET ORAL
Qty: 30 TABLET | Refills: 11 | Status: SHIPPED | OUTPATIENT
Start: 2021-03-25 | End: 2022-02-07

## 2021-03-25 NOTE — TELEPHONE ENCOUNTER
Recent Visits  Date Type Provider Dept   02/15/21 Office Visit MELLY Gunter CNP [facility]   11/11/20 Office Visit MELLY Gunter CNP [facility]   07/28/20 Office Visit MELLY Gunter CNP [facility]   06/30/20 Office Visit MELLY Gunter CNP [facility]   01/14/20 Office Visit MELLY Gunter CNP [facility]   Showing recent visits within past 540 days with a meds authorizing provider and meeting all other requirements     Future Appointments  Date Type Provider Dept   08/16/21 Appointment MELLY Gunter CNP [facility]   Showing future appointments within next 150 days with a meds authorizing provider and meeting all other requirements     Future Appointments   Date Time Provider Mayda Chappell   8/16/2021  1:20 PM MELLY Gunter

## 2021-05-19 DIAGNOSIS — E78.5 HYPERLIPIDEMIA, UNSPECIFIED HYPERLIPIDEMIA TYPE: ICD-10-CM

## 2021-05-20 RX ORDER — SIMVASTATIN 40 MG
TABLET ORAL
Qty: 90 TABLET | Refills: 3 | Status: SHIPPED | OUTPATIENT
Start: 2021-05-20 | End: 2022-07-19

## 2021-06-01 ENCOUNTER — TELEPHONE (OUTPATIENT)
Dept: FAMILY MEDICINE CLINIC | Age: 61
End: 2021-06-01

## 2021-06-01 NOTE — TELEPHONE ENCOUNTER
Called pt to see if she was interested in completing her lipid from 11/2020. She asked for the order to be mailed and she would complete it.

## 2021-06-08 ENCOUNTER — TELEPHONE (OUTPATIENT)
Dept: FAMILY MEDICINE CLINIC | Age: 61
End: 2021-06-08

## 2021-06-08 ENCOUNTER — HOSPITAL ENCOUNTER (OUTPATIENT)
Age: 61
Discharge: HOME | End: 2021-06-08
Payer: COMMERCIAL

## 2021-06-08 DIAGNOSIS — E78.2 MIXED HYPERLIPIDEMIA: ICD-10-CM

## 2021-06-08 LAB
CHOLESTEROL, TOTAL: 185 MG/DL (ref 100–199)
HDLC SERPL-MCNC: 70 MG/DL
LDL CHOLESTEROL CALCULATED: 66 MG/DL
TRIGL SERPL-MCNC: 243 MG/DL (ref 0–199)

## 2021-06-08 PROCEDURE — 36415 COLL VENOUS BLD VENIPUNCTURE: CPT

## 2021-06-08 PROCEDURE — 80061 LIPID PANEL: CPT

## 2021-07-11 DIAGNOSIS — F22 PARANOIA (HCC): ICD-10-CM

## 2021-07-11 DIAGNOSIS — F31.62 BIPOLAR DISORDER, CURRENT EPISODE MIXED, MODERATE (HCC): ICD-10-CM

## 2021-07-12 RX ORDER — LAMOTRIGINE 25 MG/1
TABLET ORAL
Qty: 120 TABLET | Refills: 3 | Status: SHIPPED | OUTPATIENT
Start: 2021-07-12 | End: 2021-11-15

## 2021-07-12 NOTE — TELEPHONE ENCOUNTER
This medication refill is regarding a electronic request by [facility].    Requested Prescriptions     Pending Prescriptions Disp Refills    lamoTRIgine (LAMICTAL) 25 MG tablet [Pharmacy Med Name: LAMOTRIGINE 25 MG TABLET] 120 tablet 3     Sig: TAKE 2 TABLETS BY MOUTH TWICE A DAY       Date of last visit: 2/15/2021   Date of next visit: 8/16/2021  Date of last refill: 2/22/2021  120/3    Rx verified, ordered and set to EP.

## 2021-07-30 RX ORDER — GAUZE BANDAGE 2" X 2"
BANDAGE TOPICAL
Qty: 90 TABLET | Refills: 3 | Status: SHIPPED | OUTPATIENT
Start: 2021-07-30

## 2021-08-15 DIAGNOSIS — J30.1 ALLERGIC RHINITIS DUE TO POLLEN: ICD-10-CM

## 2021-08-16 RX ORDER — LORATADINE 10 MG/1
TABLET ORAL
Qty: 30 TABLET | Refills: 11 | Status: SHIPPED | OUTPATIENT
Start: 2021-08-16

## 2021-08-18 ENCOUNTER — OFFICE VISIT (OUTPATIENT)
Dept: FAMILY MEDICINE CLINIC | Age: 61
End: 2021-08-18
Payer: COMMERCIAL

## 2021-08-18 VITALS
WEIGHT: 109 LBS | TEMPERATURE: 97.7 F | OXYGEN SATURATION: 97 % | DIASTOLIC BLOOD PRESSURE: 70 MMHG | SYSTOLIC BLOOD PRESSURE: 108 MMHG | HEIGHT: 62 IN | BODY MASS INDEX: 20.06 KG/M2 | RESPIRATION RATE: 12 BRPM | HEART RATE: 86 BPM

## 2021-08-18 DIAGNOSIS — F39 MOOD DISORDER (HCC): ICD-10-CM

## 2021-08-18 DIAGNOSIS — R63.0 DECREASED APPETITE: ICD-10-CM

## 2021-08-18 DIAGNOSIS — D53.9 MACROCYTIC ANEMIA: ICD-10-CM

## 2021-08-18 DIAGNOSIS — Z13.29 SCREENING FOR THYROID DISORDER: ICD-10-CM

## 2021-08-18 DIAGNOSIS — K21.9 GASTROESOPHAGEAL REFLUX DISEASE, UNSPECIFIED WHETHER ESOPHAGITIS PRESENT: Primary | ICD-10-CM

## 2021-08-18 PROCEDURE — 99214 OFFICE O/P EST MOD 30 MIN: CPT | Performed by: NURSE PRACTITIONER

## 2021-08-18 PROCEDURE — G8420 CALC BMI NORM PARAMETERS: HCPCS | Performed by: NURSE PRACTITIONER

## 2021-08-18 PROCEDURE — G8427 DOCREV CUR MEDS BY ELIG CLIN: HCPCS | Performed by: NURSE PRACTITIONER

## 2021-08-18 PROCEDURE — 3017F COLORECTAL CA SCREEN DOC REV: CPT | Performed by: NURSE PRACTITIONER

## 2021-08-18 PROCEDURE — 4004F PT TOBACCO SCREEN RCVD TLK: CPT | Performed by: NURSE PRACTITIONER

## 2021-08-18 RX ORDER — FOLIC ACID 1 MG/1
1 TABLET ORAL DAILY
Qty: 90 TABLET | Refills: 4 | Status: SHIPPED | OUTPATIENT
Start: 2021-08-18 | End: 2022-07-25 | Stop reason: SDUPTHER

## 2021-08-18 RX ORDER — PANTOPRAZOLE SODIUM 40 MG/1
TABLET, DELAYED RELEASE ORAL
Qty: 90 TABLET | Refills: 3 | Status: SHIPPED | OUTPATIENT
Start: 2021-08-18 | End: 2022-07-25 | Stop reason: SDUPTHER

## 2021-08-18 RX ORDER — MEGESTROL ACETATE 20 MG/1
TABLET ORAL
Qty: 90 TABLET | Refills: 3 | Status: SHIPPED | OUTPATIENT
Start: 2021-08-18

## 2021-08-18 ASSESSMENT — ENCOUNTER SYMPTOMS
ABDOMINAL DISTENTION: 0
NAUSEA: 1
DIARRHEA: 0
RESPIRATORY NEGATIVE: 1
BLOOD IN STOOL: 0
VOMITING: 0
ABDOMINAL PAIN: 0
BACK PAIN: 0
CONSTIPATION: 0

## 2021-08-18 NOTE — PROGRESS NOTES
megace to increase her appetite. Also had a negative stool for occult blood. Current Outpatient Medications   Medication Sig Dispense Refill    folic acid (FOLVITE) 1 MG tablet Take 1 tablet by mouth daily 90 tablet 4    pantoprazole (PROTONIX) 40 MG tablet TAKE 1 TABLET BY MOUTH EVERY DAY 90 tablet 3    megestrol (MEGACE) 20 MG tablet TAKE 1 TABLET BY MOUTH EVERY DAY 90 tablet 3    loratadine (CLARITIN) 10 MG tablet TAKE 1 TABLET BY MOUTH EVERY DAY 30 tablet 11    thiamine mononitrate 100 MG tablet TAKE 1 TABLET BY MOUTH EVERY DAY 90 tablet 3    lamoTRIgine (LAMICTAL) 25 MG tablet TAKE 2 TABLETS BY MOUTH TWICE A  tablet 3    simvastatin (ZOCOR) 40 MG tablet TAKE 1 TABLET BY MOUTH EVERY DAY AT NIGHT 90 tablet 3    lisinopril-hydroCHLOROthiazide (PRINZIDE;ZESTORETIC) 10-12.5 MG per tablet TAKE 1 TABLET BY MOUTH EVERY DAY 30 tablet 11    thiamine mononitrate 100 MG tablet TAKE 1 TABLET BY MOUTH EVERY DAY      ondansetron (ZOFRAN) 4 MG tablet Take 1 tablet by mouth 3 times daily as needed for Nausea or Vomiting 30 tablet 0    sertraline (ZOLOFT) 50 MG tablet TAKE 1 TABLET BY MOUTH EVERY DAY 90 tablet 3    fluticasone (FLONASE) 50 MCG/ACT nasal spray 2 sprays by Nasal route daily 1 Bottle 5    ASPIRIN LOW DOSE 81 MG EC tablet TAKE 1 TABLET BY MOUTH DAILY 90 tablet 3    albuterol sulfate HFA (PROVENTIL HFA) 108 (90 Base) MCG/ACT inhaler Inhale 2 puffs into the lungs every 4 hours as needed for Wheezing or Shortness of Breath (Space out to every 6 hours as symptoms improve) Space out to every 6 hours as symptoms improve. 1 Inhaler 0     No current facility-administered medications for this visit.           Past Medical History:   Diagnosis Date    Anxiety     Asthma     Bipolar disorder ([address]     Blood clotting disorder ([address] feb 2014    was on coumadin until April 2014    Breast cyst 7-8 yrs ago    rt    Depression     Duodenal ulcer 01/05/2019    GERD (gastroesophageal reflux disease)     Hyperlipidemia     Hypertension     Schizophrenia ([facility] [address]       Past Surgical History:   Procedure Laterality Date    BREAST SURGERY  10 yrs    lump right     SECTION      x 1    ELBOW SURGERY Left 2013    Deep hardware removal with ulnar nerve decompression    FASCIOTOMY Left 2013    L arm    OTHER SURGICAL HISTORY  3/19/2013    LEFT ELBOW ORIF WITH WOUND CLOSURE    TUBAL LIGATION      UPPER GASTROINTESTINAL ENDOSCOPY  2019     Family History   Problem Relation Age of Onset    Cancer Mother     Diabetes Father     Cancer Maternal Uncle         bone    Cancer Maternal Grandmother         uterine    Diabetes Maternal Grandfather     Cancer Brother         Colon Cancer     Social History     Tobacco Use    Smoking status: Current Some Day Smoker     Packs/day: 0.00     Years: 35.00     Pack years: 0.00     Types: Cigarettes    Smokeless tobacco: Never Used   Substance Use Topics    Alcohol use:  Yes     Alcohol/week: 5.0 standard drinks     Types: 5 Glasses of wine per week     Comment: wine occasional        Allergies   Allergen Reactions    Pork-Derived Products Nausea Only       Health Maintenance   Topic Date Due    COVID-19 Vaccine (1) Never done    Potassium monitoring  01/10/2021    Creatinine monitoring  01/10/2021    Shingles Vaccine (1 of 2) 2021 (Originally 2009)    Flu vaccine (1) 2021    Lipid screen  2022    Breast cancer screen  2023    DTaP/Tdap/Td vaccine (2 - Td or Tdap) 2023    Cervical cancer screen  02/15/2024    Pneumococcal 0-64 years Vaccine (2 of 2 - PPSV23) 2024    Colon cancer screen colonoscopy  02/15/2029    Hepatitis C screen  Addressed    HIV screen  Addressed    Hepatitis A vaccine  Aged Out    Hepatitis B vaccine  Aged Out    Hib vaccine  Aged Out    Meningococcal (ACWY) vaccine  Aged Out       Subjective:      Review of Systems   Constitutional: Negative for chills, fatigue and fever. HENT: Negative. Respiratory: Negative. Cardiovascular: Negative. Gastrointestinal: Positive for nausea (sometimes better with acid reflux meds). Negative for abdominal distention, abdominal pain, blood in stool, constipation, diarrhea and vomiting. Genitourinary: Negative for difficulty urinating and dysuria. Musculoskeletal: Negative for arthralgias, back pain and myalgias. Skin: Negative. Neurological: Negative for dizziness, syncope, facial asymmetry, weakness, light-headedness and headaches. Psychiatric/Behavioral: Positive for agitation (soem times when she gets upset), behavioral problems (improved with lamictal ) and sleep disturbance (better with meds ). Negative for confusion, decreased concentration and suicidal ideas. The patient is hyperactive. The patient is not nervous/anxious. Objective:      /70   Pulse 86   Temp 97.7 °F (36.5 °C) (Oral)   Resp 12   Ht 5' 2\" (1.575 m)   Wt 109 lb (49.4 kg)   SpO2 97%   BMI 19.94 kg/m²      Physical Exam  Vitals and nursing note reviewed. Constitutional:       Appearance: She is not ill-appearing. HENT:      Mouth/Throat:      Mouth: Mucous membranes are moist.   Eyes:      Pupils: Pupils are equal, round, and reactive to light. Cardiovascular:      Rate and Rhythm: Normal rate and regular rhythm. Pulses: Normal pulses. Heart sounds: Normal heart sounds. No murmur heard. Pulmonary:      Effort: Pulmonary effort is normal. No respiratory distress. Breath sounds: Normal breath sounds. No wheezing. Abdominal:      General: Abdomen is flat. Bowel sounds are normal. There is no distension. Palpations: Abdomen is soft. Musculoskeletal:         General: Normal range of motion. Skin:     General: Skin is warm and dry. Capillary Refill: Capillary refill takes less than 2 seconds. Neurological:      General: No focal deficit present.       Mental Status: She is alert and oriented to person, place, and time. Psychiatric:         Attention and Perception: Attention normal.         Mood and Affect: Mood is elated. Speech: Speech is rapid and pressured. Behavior: Behavior normal. Behavior is cooperative. Thought Content: Thought content does not include homicidal or suicidal plan. Cognition and Memory: Cognition normal.         Judgment: Judgment is impulsive. Assessment/Plan:           1. Gastroesophageal reflux disease  gerd diet  Improved   - pantoprazole (PROTONIX) 40 MG tablet; TAKE 1 TABLET BY MOUTH EVERY DAY  Dispense: 90 tablet; Refill: 3  - Basic Metabolic Panel; Future    2. Decreased appetite  improving and staying stable  Continue with carnation instant breakfast and increse calories high protein foods   - megestrol (MEGACE) 20 MG tablet; TAKE 1 TABLET BY MOUTH EVERY DAY  Dispense: 90 tablet; Refill: 3  - Basic Metabolic Panel; Future    3. Macrocytic anemia  Continue with MVI and thiamine and folic acid  - CBC With Auto Differential; Future    4. Screening for thyroid disorder    - TSH With Reflex Ft4; Future    5. Mood disorder ([facility] [address]  Improved with current med  Pt declines any med changes   Pt in agreement with plan     Return in about 6 months (around 2/18/2022) for check up . Reccommended tobaccocessation options including pharmacologic methods, counseled great than 3 minutesduring this visit:  Yes[]  No  []       Patient given educational materials -see patient instructions. Discussed use, benefit, and side effects of prescribedmedications. All patient questions answered. Pt voiced understanding. Reviewedhealth maintenance. Instructed to continue current medications, diet and exercise. Patient agreed with treatment plan. Follow up as directed.        Electronicallysigned by MELLY Hudson CNP on 8/18/2021 at 10:33 AM

## 2021-11-05 ENCOUNTER — APPOINTMENT (OUTPATIENT)
Dept: GENERAL RADIOLOGY | Age: 61
End: 2021-11-05
Payer: COMMERCIAL

## 2021-11-05 ENCOUNTER — HOSPITAL ENCOUNTER (EMERGENCY)
Age: 61
Discharge: HOME | End: 2021-11-05
Attending: FAMILY MEDICINE
Payer: COMMERCIAL

## 2021-11-05 VITALS
OXYGEN SATURATION: 99 % | TEMPERATURE: 98.8 F | SYSTOLIC BLOOD PRESSURE: 151 MMHG | DIASTOLIC BLOOD PRESSURE: 82 MMHG | RESPIRATION RATE: 18 BRPM | HEART RATE: 89 BPM

## 2021-11-05 DIAGNOSIS — S52.692A OTHER CLOSED FRACTURE OF DISTAL END OF LEFT ULNA, INITIAL ENCOUNTER: Primary | ICD-10-CM

## 2021-11-05 DIAGNOSIS — S22.31XA CLOSED FRACTURE OF ONE RIB OF RIGHT SIDE, INITIAL ENCOUNTER: ICD-10-CM

## 2021-11-05 LAB
ALBUMIN SERPL-MCNC: 4.1 G/DL (ref 3.5–5.1)
ALP BLD-CCNC: 168 U/L (ref 38–126)
ALT SERPL-CCNC: 25 U/L (ref 11–66)
ANION GAP SERPL CALCULATED.3IONS-SCNC: 13 MEQ/L (ref 8–16)
AST SERPL-CCNC: 84 U/L (ref 5–40)
BASOPHILS # BLD: 0.5 %
BASOPHILS ABSOLUTE: 0 THOU/MM3 (ref 0–0.1)
BILIRUB SERPL-MCNC: 0.4 MG/DL (ref 0.3–1.2)
BUN BLDV-MCNC: 6 MG/DL (ref 7–22)
CALCIUM SERPL-MCNC: 9.3 MG/DL (ref 8.5–10.5)
CHLORIDE BLD-SCNC: 104 MEQ/L (ref 98–111)
CO2: 26 MEQ/L (ref 23–33)
CREAT SERPL-MCNC: 0.5 MG/DL (ref 0.4–1.2)
EOSINOPHIL # BLD: 0.8 %
EOSINOPHILS ABSOLUTE: 0.1 THOU/MM3 (ref 0–0.4)
ERYTHROCYTE [DISTWIDTH] IN BLOOD BY AUTOMATED COUNT: 12.4 % (ref 11.5–14.5)
ERYTHROCYTE [DISTWIDTH] IN BLOOD BY AUTOMATED COUNT: 44.5 FL (ref 35–45)
GFR SERPL CREATININE-BSD FRML MDRD: > 90 ML/MIN/1.73M2
GLUCOSE BLD-MCNC: 121 MG/DL (ref 70–108)
HCT VFR BLD CALC: 41 % (ref 37–47)
HEMOGLOBIN: 14.3 GM/DL (ref 12–16)
IMMATURE GRANS (ABS): 0.02 THOU/MM3 (ref 0–0.07)
IMMATURE GRANULOCYTES: 0.3 %
LYMPHOCYTES # BLD: 22.2 %
LYMPHOCYTES ABSOLUTE: 1.7 THOU/MM3 (ref 1–4.8)
MAGNESIUM: 2 MG/DL (ref 1.6–2.4)
MCH RBC QN AUTO: 34.3 PG (ref 26–33)
MCHC RBC AUTO-ENTMCNC: 34.9 GM/DL (ref 32.2–35.5)
MCV RBC AUTO: 98.3 FL (ref 81–99)
MONOCYTES # BLD: 7.3 %
MONOCYTES ABSOLUTE: 0.6 THOU/MM3 (ref 0.4–1.3)
NUCLEATED RED BLOOD CELLS: 0 /100 WBC
OSMOLALITY CALCULATION: 283.8 MOSMOL/KG (ref 275–300)
PLATELET # BLD: 282 THOU/MM3 (ref 130–400)
PMV BLD AUTO: 9.5 FL (ref 9.4–12.4)
POTASSIUM SERPL-SCNC: 4.2 MEQ/L (ref 3.5–5.2)
PRO-BNP: 95.4 PG/ML (ref 0–900)
RBC # BLD: 4.17 MILL/MM3 (ref 4.2–5.4)
SEG NEUTROPHILS: 68.9 %
SEGMENTED NEUTROPHILS ABSOLUTE COUNT: 5.4 THOU/MM3 (ref 1.8–7.7)
SODIUM BLD-SCNC: 143 MEQ/L (ref 135–145)
TOTAL PROTEIN: 7.8 G/DL (ref 6.1–8)
TROPONIN T: < 0.01 NG/ML
WBC # BLD: 7.8 THOU/MM3 (ref 4.8–10.8)

## 2021-11-05 PROCEDURE — 80053 COMPREHEN METABOLIC PANEL: CPT

## 2021-11-05 PROCEDURE — 36415 COLL VENOUS BLD VENIPUNCTURE: CPT

## 2021-11-05 PROCEDURE — 93005 ELECTROCARDIOGRAM TRACING: CPT | Performed by: FAMILY MEDICINE

## 2021-11-05 PROCEDURE — 83735 ASSAY OF MAGNESIUM: CPT

## 2021-11-05 PROCEDURE — 71045 X-RAY EXAM CHEST 1 VIEW: CPT

## 2021-11-05 PROCEDURE — 73090 X-RAY EXAM OF FOREARM: CPT

## 2021-11-05 PROCEDURE — 6370000000 HC RX 637 (ALT 250 FOR IP): Performed by: STUDENT IN AN ORGANIZED HEALTH CARE EDUCATION/TRAINING PROGRAM

## 2021-11-05 PROCEDURE — 85025 COMPLETE CBC W/AUTO DIFF WBC: CPT

## 2021-11-05 PROCEDURE — 83880 ASSAY OF NATRIURETIC PEPTIDE: CPT

## 2021-11-05 PROCEDURE — 29105 APPLICATION LONG ARM SPLINT: CPT

## 2021-11-05 PROCEDURE — 99282 EMERGENCY DEPT VISIT SF MDM: CPT

## 2021-11-05 PROCEDURE — 71100 X-RAY EXAM RIBS UNI 2 VIEWS: CPT

## 2021-11-05 PROCEDURE — 84484 ASSAY OF TROPONIN QUANT: CPT

## 2021-11-05 RX ORDER — HYDROCODONE BITARTRATE AND ACETAMINOPHEN 5; 325 MG/1; MG/1
1 TABLET ORAL ONCE
Status: COMPLETED | OUTPATIENT
Start: 2021-11-05 | End: 2021-11-05

## 2021-11-05 ASSESSMENT — PAIN DESCRIPTION - PAIN TYPE: TYPE: ACUTE PAIN

## 2021-11-05 ASSESSMENT — ENCOUNTER SYMPTOMS
SHORTNESS OF BREATH: 0
VOMITING: 0
NAUSEA: 0
RHINORRHEA: 0
DIARRHEA: 0
ABDOMINAL PAIN: 0
SINUS PAIN: 0
BACK PAIN: 0
COUGH: 0
EYE REDNESS: 0
SORE THROAT: 0

## 2021-11-05 ASSESSMENT — PAIN DESCRIPTION - LOCATION: LOCATION: ARM;CHEST

## 2021-11-05 ASSESSMENT — PAIN SCALES - GENERAL
PAINLEVEL_OUTOF10: 8
PAINLEVEL_OUTOF10: 8

## 2021-11-05 NOTE — ED NOTES
Pt resting on cot with  at bedside. Pt respirations even and unlabored.      Franky Sandhu RN  11/05/21 1099

## 2021-11-05 NOTE — ED NOTES
Pt medicated per provider order. Pt tolerates well. Pt rates pain 8/10 at this time.       Sayda Long RN  11/05/21 2910
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
## 2021-11-06 LAB
EKG ATRIAL RATE: 83 BPM
EKG P AXIS: 79 DEGREES
EKG P-R INTERVAL: 146 MS
EKG Q-T INTERVAL: 362 MS
EKG QRS DURATION: 70 MS
EKG QTC CALCULATION (BAZETT): 425 MS
EKG R AXIS: 59 DEGREES
EKG T AXIS: 54 DEGREES
EKG VENTRICULAR RATE: 83 BPM

## 2021-11-13 DIAGNOSIS — F22 PARANOIA (HCC): ICD-10-CM

## 2021-11-13 DIAGNOSIS — F31.62 BIPOLAR DISORDER, CURRENT EPISODE MIXED, MODERATE (HCC): ICD-10-CM

## 2021-11-15 RX ORDER — LAMOTRIGINE 25 MG/1
TABLET ORAL
Qty: 120 TABLET | Refills: 3 | Status: SHIPPED | OUTPATIENT
Start: 2021-11-15 | End: 2022-02-07

## 2021-11-15 NOTE — TELEPHONE ENCOUNTER
Recent Visits  Date Type Provider Dept   08/18/21 Office Visit MELLY Funez CNP [facility]   02/15/21 Office Visit MELLY Funez CNP [facility]   11/11/20 Office Visit MELLY Funez CNP [facility]   07/28/20 Office Visit MELLY Funez CNP [facility]   06/30/20 Office Visit MELLY Funez CNP [facility]   Showing recent visits within past 540 days with a meds authorizing provider and meeting all other requirements  Future Appointments  Date Type Provider Dept   02/21/22 Appointment MELLY Funez CNP [facility]   Showing future appointments within next 150 days with a meds authorizing provider and meeting all other requirements        Future Appointments   Date Time Provider Mayda Chappell   2/21/2022  9:40 AM MELLY Funez

## 2022-01-10 ENCOUNTER — HOSPITAL ENCOUNTER (EMERGENCY)
Age: 62
Discharge: HOME | End: 2022-01-10
Attending: EMERGENCY MEDICINE
Payer: COMMERCIAL

## 2022-01-10 ENCOUNTER — TELEPHONE (OUTPATIENT)
Dept: FAMILY MEDICINE CLINIC | Age: 62
End: 2022-01-10

## 2022-01-10 ENCOUNTER — APPOINTMENT (OUTPATIENT)
Dept: GENERAL RADIOLOGY | Age: 62
End: 2022-01-10
Payer: COMMERCIAL

## 2022-01-10 VITALS
TEMPERATURE: 98.2 F | HEART RATE: 77 BPM | WEIGHT: 114 LBS | DIASTOLIC BLOOD PRESSURE: 95 MMHG | HEIGHT: 61 IN | SYSTOLIC BLOOD PRESSURE: 147 MMHG | RESPIRATION RATE: 16 BRPM | BODY MASS INDEX: 21.52 KG/M2 | OXYGEN SATURATION: 100 %

## 2022-01-10 DIAGNOSIS — U07.1 COVID: Primary | ICD-10-CM

## 2022-01-10 LAB
ALBUMIN SERPL-MCNC: 4.1 G/DL (ref 3.5–5.1)
ALP BLD-CCNC: 165 U/L (ref 38–126)
ALT SERPL-CCNC: 37 U/L (ref 11–66)
ANION GAP SERPL CALCULATED.3IONS-SCNC: 17 MEQ/L (ref 8–16)
AST SERPL-CCNC: 114 U/L (ref 5–40)
BASOPHILS # BLD: 0.4 %
BASOPHILS ABSOLUTE: 0 THOU/MM3 (ref 0–0.1)
BILIRUB SERPL-MCNC: 0.6 MG/DL (ref 0.3–1.2)
BILIRUBIN DIRECT: < 0.2 MG/DL (ref 0–0.3)
BUN BLDV-MCNC: 9 MG/DL (ref 7–22)
CALCIUM SERPL-MCNC: 8.3 MG/DL (ref 8.5–10.5)
CHLORIDE BLD-SCNC: 106 MEQ/L (ref 98–111)
CO2: 21 MEQ/L (ref 23–33)
CREAT SERPL-MCNC: 0.6 MG/DL (ref 0.4–1.2)
EOSINOPHIL # BLD: 1.8 %
EOSINOPHILS ABSOLUTE: 0.1 THOU/MM3 (ref 0–0.4)
ERYTHROCYTE [DISTWIDTH] IN BLOOD BY AUTOMATED COUNT: 15.2 % (ref 11.5–14.5)
ERYTHROCYTE [DISTWIDTH] IN BLOOD BY AUTOMATED COUNT: 56.8 FL (ref 35–45)
FLU A ANTIGEN: NEGATIVE
FLU B ANTIGEN: NEGATIVE
GFR SERPL CREATININE-BSD FRML MDRD: > 90 ML/MIN/1.73M2
GLUCOSE BLD-MCNC: 108 MG/DL (ref 70–108)
HCT VFR BLD CALC: 38 % (ref 37–47)
HEMOGLOBIN: 13.2 GM/DL (ref 12–16)
IMMATURE GRANS (ABS): 0.02 THOU/MM3 (ref 0–0.07)
IMMATURE GRANULOCYTES: 0.3 %
LIPASE: 18.3 U/L (ref 5.6–51.3)
LYMPHOCYTES # BLD: 29 %
LYMPHOCYTES ABSOLUTE: 2.1 THOU/MM3 (ref 1–4.8)
MAGNESIUM: 1.8 MG/DL (ref 1.6–2.4)
MCH RBC QN AUTO: 35.3 PG (ref 26–33)
MCHC RBC AUTO-ENTMCNC: 34.7 GM/DL (ref 32.2–35.5)
MCV RBC AUTO: 101.6 FL (ref 81–99)
MONOCYTES # BLD: 14.7 %
MONOCYTES ABSOLUTE: 1.1 THOU/MM3 (ref 0.4–1.3)
NUCLEATED RED BLOOD CELLS: 0 /100 WBC
OSMOLALITY CALCULATION: 286.1 MOSMOL/KG (ref 275–300)
PLATELET # BLD: 224 THOU/MM3 (ref 130–400)
PMV BLD AUTO: 9.2 FL (ref 9.4–12.4)
POTASSIUM SERPL-SCNC: 3.8 MEQ/L (ref 3.5–5.2)
PRO-BNP: 21.8 PG/ML (ref 0–900)
RBC # BLD: 3.74 MILL/MM3 (ref 4.2–5.4)
SARS-COV-2, NAAT: DETECTED
SEG NEUTROPHILS: 53.8 %
SEGMENTED NEUTROPHILS ABSOLUTE COUNT: 4 THOU/MM3 (ref 1.8–7.7)
SODIUM BLD-SCNC: 144 MEQ/L (ref 135–145)
TOTAL PROTEIN: 7.1 G/DL (ref 6.1–8)
TROPONIN T: < 0.01 NG/ML
WBC # BLD: 7.4 THOU/MM3 (ref 4.8–10.8)

## 2022-01-10 PROCEDURE — 84484 ASSAY OF TROPONIN QUANT: CPT

## 2022-01-10 PROCEDURE — 6360000002 HC RX W HCPCS: Performed by: EMERGENCY MEDICINE

## 2022-01-10 PROCEDURE — 87804 INFLUENZA ASSAY W/OPTIC: CPT

## 2022-01-10 PROCEDURE — 82248 BILIRUBIN DIRECT: CPT

## 2022-01-10 PROCEDURE — 83690 ASSAY OF LIPASE: CPT

## 2022-01-10 PROCEDURE — 83735 ASSAY OF MAGNESIUM: CPT

## 2022-01-10 PROCEDURE — 93005 ELECTROCARDIOGRAM TRACING: CPT | Performed by: EMERGENCY MEDICINE

## 2022-01-10 PROCEDURE — 2580000003 HC RX 258: Performed by: EMERGENCY MEDICINE

## 2022-01-10 PROCEDURE — 96375 TX/PRO/DX INJ NEW DRUG ADDON: CPT

## 2022-01-10 PROCEDURE — 99283 EMERGENCY DEPT VISIT LOW MDM: CPT

## 2022-01-10 PROCEDURE — 83880 ASSAY OF NATRIURETIC PEPTIDE: CPT

## 2022-01-10 PROCEDURE — 80053 COMPREHEN METABOLIC PANEL: CPT

## 2022-01-10 PROCEDURE — 71045 X-RAY EXAM CHEST 1 VIEW: CPT

## 2022-01-10 PROCEDURE — 87635 SARS-COV-2 COVID-19 AMP PRB: CPT

## 2022-01-10 PROCEDURE — 96374 THER/PROPH/DIAG INJ IV PUSH: CPT

## 2022-01-10 PROCEDURE — 6370000000 HC RX 637 (ALT 250 FOR IP): Performed by: EMERGENCY MEDICINE

## 2022-01-10 PROCEDURE — 85025 COMPLETE CBC W/AUTO DIFF WBC: CPT

## 2022-01-10 RX ORDER — DEXAMETHASONE 6 MG/1
6 TABLET ORAL 2 TIMES DAILY WITH MEALS
Qty: 20 TABLET | Refills: 0 | Status: SHIPPED | OUTPATIENT
Start: 2022-01-10 | End: 2022-01-20

## 2022-01-10 RX ORDER — ONDANSETRON 2 MG/ML
8 INJECTION INTRAMUSCULAR; INTRAVENOUS
Status: CANCELLED | OUTPATIENT
Start: 2022-01-10 | End: 2022-01-10

## 2022-01-10 RX ORDER — SODIUM CHLORIDE 9 MG/ML
25 INJECTION, SOLUTION INTRAVENOUS PRN
Status: CANCELLED | OUTPATIENT
Start: 2022-01-10

## 2022-01-10 RX ORDER — SODIUM CHLORIDE 9 MG/ML
INJECTION, SOLUTION INTRAVENOUS CONTINUOUS PRN
Status: CANCELLED | OUTPATIENT
Start: 2022-01-10 | End: 2022-01-10

## 2022-01-10 RX ORDER — ACETAMINOPHEN 325 MG/1
650 TABLET ORAL
Status: CANCELLED | OUTPATIENT
Start: 2022-01-10 | End: 2022-01-10

## 2022-01-10 RX ORDER — ALBUTEROL SULFATE 90 UG/1
4 AEROSOL, METERED RESPIRATORY (INHALATION) PRN
Status: CANCELLED | OUTPATIENT
Start: 2022-01-10

## 2022-01-10 RX ORDER — DEXAMETHASONE SODIUM PHOSPHATE 4 MG/ML
10 INJECTION, SOLUTION INTRA-ARTICULAR; INTRALESIONAL; INTRAMUSCULAR; INTRAVENOUS; SOFT TISSUE ONCE
Status: COMPLETED | OUTPATIENT
Start: 2022-01-10 | End: 2022-01-10

## 2022-01-10 RX ORDER — 0.9 % SODIUM CHLORIDE 0.9 %
1000 INTRAVENOUS SOLUTION INTRAVENOUS ONCE
Status: COMPLETED | OUTPATIENT
Start: 2022-01-10 | End: 2022-01-10

## 2022-01-10 RX ORDER — ONDANSETRON 4 MG/1
4 TABLET, FILM COATED ORAL 3 TIMES DAILY PRN
Qty: 15 TABLET | Refills: 0 | Status: SHIPPED | OUTPATIENT
Start: 2022-01-10

## 2022-01-10 RX ORDER — METHYLPREDNISOLONE SODIUM SUCCINATE 125 MG/2ML
125 INJECTION, POWDER, LYOPHILIZED, FOR SOLUTION INTRAMUSCULAR; INTRAVENOUS
Status: CANCELLED | OUTPATIENT
Start: 2022-01-10 | End: 2022-01-10

## 2022-01-10 RX ORDER — SODIUM CHLORIDE 0.9 % (FLUSH) 0.9 %
5-40 SYRINGE (ML) INJECTION EVERY 12 HOURS SCHEDULED
Status: CANCELLED | OUTPATIENT
Start: 2022-01-10

## 2022-01-10 RX ORDER — ONDANSETRON 2 MG/ML
4 INJECTION INTRAMUSCULAR; INTRAVENOUS ONCE
Status: COMPLETED | OUTPATIENT
Start: 2022-01-10 | End: 2022-01-10

## 2022-01-10 RX ORDER — DIPHENHYDRAMINE HYDROCHLORIDE 50 MG/ML
50 INJECTION INTRAMUSCULAR; INTRAVENOUS
Status: CANCELLED | OUTPATIENT
Start: 2022-01-10 | End: 2022-01-10

## 2022-01-10 RX ORDER — SODIUM CHLORIDE 9 MG/ML
100 INJECTION, SOLUTION INTRAVENOUS CONTINUOUS PRN
Status: CANCELLED | OUTPATIENT
Start: 2022-01-10

## 2022-01-10 RX ORDER — ALBUTEROL SULFATE 90 UG/1
2 AEROSOL, METERED RESPIRATORY (INHALATION) EVERY 4 HOURS PRN
Status: DISCONTINUED | OUTPATIENT
Start: 2022-01-10 | End: 2022-01-10 | Stop reason: HOSPADM

## 2022-01-10 RX ORDER — SODIUM CHLORIDE 0.9 % (FLUSH) 0.9 %
5-40 SYRINGE (ML) INJECTION PRN
Status: CANCELLED | OUTPATIENT
Start: 2022-01-10

## 2022-01-10 RX ADMIN — DEXAMETHASONE SODIUM PHOSPHATE 10 MG: 4 INJECTION, SOLUTION INTRAMUSCULAR; INTRAVENOUS at 05:52

## 2022-01-10 RX ADMIN — SODIUM CHLORIDE 1000 ML: 9 INJECTION, SOLUTION INTRAVENOUS at 04:46

## 2022-01-10 RX ADMIN — ONDANSETRON 4 MG: 2 INJECTION INTRAMUSCULAR; INTRAVENOUS at 04:50

## 2022-01-10 RX ADMIN — ALBUTEROL SULFATE 2 PUFF: 90 AEROSOL, METERED RESPIRATORY (INHALATION) at 05:52

## 2022-01-10 ASSESSMENT — ENCOUNTER SYMPTOMS
ABDOMINAL PAIN: 0
SHORTNESS OF BREATH: 1
EYE PAIN: 0
PHOTOPHOBIA: 0
DIARRHEA: 1
NAUSEA: 1
BACK PAIN: 0
BLOOD IN STOOL: 0
CONSTIPATION: 0
SORE THROAT: 0
VOMITING: 0
EYE DISCHARGE: 0
WHEEZING: 0
TROUBLE SWALLOWING: 0
COUGH: 1
ABDOMINAL DISTENTION: 0
RHINORRHEA: 0
EYE ITCHING: 0
CHOKING: 0
SINUS PRESSURE: 0
CHEST TIGHTNESS: 0
EYE REDNESS: 0
VOICE CHANGE: 0

## 2022-01-10 NOTE — TELEPHONE ENCOUNTER
Patient calling to inform provider that she was seen at ER today and tested covid + sxs started 2 weeks ago SOB, vomiting, and diarrhea.   Patient already scheduled for the monoclonal antibody infusion 01.15.2021  All information from ER has been forwarded on to the provider

## 2022-01-10 NOTE — ED TRIAGE NOTES
Pt to ER with complaints of shortness of breath, vomiting, and diarrhea for two weeks. Pt states she hasn't been around anyone who has been sick. VSS. EKG performed.

## 2022-01-10 NOTE — ED PROVIDER NOTES
[facility]  eMERGENCY dEPARTMENT eNCOUnter          [address] [facility]       Chief Complaint   Patient presents with    Shortness of Breath    Diarrhea    Emesis       Nurses Notes reviewed and I agree except as noted in the HPI. HISTORY OF PRESENT ILLNESS    Balwinder Hernandez is a 57 y.o. female who presents with cough shortness of breath nausea diarrhea. Onset approximately 3 days. Patient does have a history of alcohol abuse. She drinks daily she did drink today. Patient states that she has had a cough and some shortness of breath. Friend at bedside said that they were recently diagnosed with influenza. They are worried that she has had COVID. She has not had her shots. Patient is not having any syncopal or presyncopal episodes. Patient has no overt chest pain. Patient has not noticed any blood in her stool or blood in her urine. Patient has not had any vomiting. Currently the patient is resting comfortably on the cot no apparent distress no other physical complaints at this time. REVIEW OF SYSTEMS     Review of Systems   Constitutional: Positive for fatigue. Negative for activity change, appetite change, diaphoresis, fever and unexpected weight change. HENT: Negative for congestion, ear discharge, ear pain, hearing loss, rhinorrhea, sinus pressure, sore throat, trouble swallowing and voice change. Eyes: Negative for photophobia, pain, discharge, redness and itching. Respiratory: Positive for cough and shortness of breath. Negative for choking, chest tightness and wheezing. Cardiovascular: Negative for chest pain, palpitations and leg swelling. Gastrointestinal: Positive for diarrhea and nausea. Negative for abdominal distention, abdominal pain, blood in stool, constipation and vomiting. Endocrine: Negative for polydipsia, polyphagia and polyuria.    Genitourinary: Negative for decreased urine volume, difficulty urinating, dysuria, enuresis, frequency, hematuria BY MOUTH EVERY DAY    ONDANSETRON (ZOFRAN) 4 MG TABLET    Take 1 tablet by mouth 3 times daily as needed for Nausea or Vomiting    PANTOPRAZOLE (PROTONIX) 40 MG TABLET    TAKE 1 TABLET BY MOUTH EVERY DAY    SERTRALINE (ZOLOFT) 50 MG TABLET    TAKE 1 TABLET BY MOUTH EVERY DAY    SIMVASTATIN (ZOCOR) 40 MG TABLET    TAKE 1 TABLET BY MOUTH EVERY DAY AT NIGHT    THIAMINE MONONITRATE 100 MG TABLET    TAKE 1 TABLET BY MOUTH EVERY DAY    THIAMINE MONONITRATE 100 MG TABLET    TAKE 1 TABLET BY MOUTH EVERY DAY       ALLERGIES     is allergic to pork-derived products. FAMILY HISTORY     She indicated that her mother is . She indicated that her father is . She indicated that her brother is alive. She indicated that the status of her maternal grandmother is unknown. She indicated that the status of her maternal grandfather is unknown. She indicated that the status of her maternal uncle is unknown.   family history includes Cancer in her brother, maternal grandmother, maternal uncle, and mother; Diabetes in her father and maternal grandfather. SOCIAL HISTORY      reports that she has been smoking cigarettes. She has been smoking about 0.00 packs per day for the past 35.00 years. She has never used smokeless tobacco. She reports current alcohol use of about 5.0 standard drinks of alcohol per week. She reports that she does not use drugs. PHYSICAL EXAM     INITIAL VITALS:  height is 5' 1\" (1.549 m) and weight is 114 lb (51.7 kg). Her oral temperature is 98.2 °F (36.8 °C). Her blood pressure is 147/95 (abnormal) and her pulse is 77. Her respiration is 16 and oxygen saturation is 100%. Physical Exam  Vitals and nursing note reviewed. Constitutional:       General: She is not in acute distress. Appearance: She is well-developed. She is not diaphoretic. HENT:      Head: Normocephalic and atraumatic.       Right Ear: External ear normal.      Left Ear: External ear normal.      Nose: Nose normal. Mouth/Throat:      Pharynx: No oropharyngeal exudate. Eyes:      General: No scleral icterus. Right eye: No discharge. Left eye: No discharge. Conjunctiva/sclera: Conjunctivae normal.      Pupils: Pupils are equal, round, and reactive to light. Neck:      Thyroid: No thyromegaly. Vascular: No JVD. Trachea: No tracheal deviation. Cardiovascular:      Rate and Rhythm: Normal rate and regular rhythm. Pulses:           Carotid pulses are 2+ on the right side and 2+ on the left side. Radial pulses are 2+ on the right side and 2+ on the left side. Femoral pulses are 2+ on the right side and 2+ on the left side. Popliteal pulses are 2+ on the right side and 2+ on the left side. Dorsalis pedis pulses are 2+ on the right side and 2+ on the left side. Posterior tibial pulses are 2+ on the right side and 2+ on the left side. Heart sounds: Normal heart sounds, S1 normal and S2 normal. No murmur heard. No friction rub. No gallop. Pulmonary:      Effort: Pulmonary effort is normal.      Breath sounds: No stridor. Examination of the right-lower field reveals decreased breath sounds. Examination of the left-lower field reveals decreased breath sounds. Decreased breath sounds present. No wheezing, rhonchi or rales. Chest:      Chest wall: No tenderness. Abdominal:      General: Bowel sounds are normal. There is no distension. Palpations: Abdomen is soft. There is no mass. Tenderness: There is no abdominal tenderness. There is no guarding or rebound. Hernia: No hernia is present. Musculoskeletal:         General: No tenderness. Normal range of motion. Cervical back: Normal range of motion and neck supple. Right lower leg: No edema. Left lower leg: No edema. Lymphadenopathy:      Cervical: No cervical adenopathy. Skin:     General: Skin is warm and dry.       Capillary Refill: Capillary refill takes less than 2 seconds. Findings: No bruising, ecchymosis, lesion or rash. Neurological:      General: No focal deficit present. Mental Status: She is alert. Mental status is at baseline. She is disoriented. Cranial Nerves: No cranial nerve deficit. Sensory: No sensory deficit. Motor: No weakness. Coordination: Coordination normal.      Gait: Gait normal.      Deep Tendon Reflexes: Reflexes are normal and symmetric. Reflexes normal.   Psychiatric:         Speech: Speech normal.         Behavior: Behavior normal.         Thought Content: Thought content normal.         Judgment: Judgment normal.           DIFFERENTIAL DIAGNOSIS:   Pneumonia bronchitis COVID-19 influenza acute alcohol intoxication    DIAGNOSTIC RESULTS     EKG: All EKG's are interpreted by the Emergency Department Physician who either signs or Co-signs this chart in the absence of a cardiologist.  None    RADIOLOGY: non-plain film images(s) such as CT, Ultrasound and MRI are read by the radiologist.  XR CHEST PORTABLE   Final Result   Impression:   1. Subtle right perihilar airspace disease which may represent early    pneumonia.       This document has been electronically signed by: Cheryl Bah MD on    01/10/2022 05:06 AM        .    LABS:   Labs Reviewed   COVID-19, RAPID - Abnormal; Notable for the following components:       Result Value    SARS-CoV-2, NAAT DETECTED (*)     All other components within normal limits   CBC WITH AUTO DIFFERENTIAL - Abnormal; Notable for the following components:    RBC 3.74 (*)     .6 (*)     MCH 35.3 (*)     RDW-CV 15.2 (*)     RDW-SD 56.8 (*)     MPV 9.2 (*)     All other components within normal limits   RAPID INFLUENZA A/B ANTIGENS   BRAIN NATRIURETIC PEPTIDE   TROPONIN   BASIC METABOLIC PANEL   HEPATIC FUNCTION PANEL   LIPASE   MAGNESIUM       EMERGENCY DEPARTMENT COURSE:   Vitals:    Vitals:    01/10/22 0436 01/10/22 0438 01/10/22 0528   BP:  (!) 147/95    Pulse:  88 77   Resp:  18 16   Temp: 98.2 °F (36.8 °C)     TempSrc: Oral     SpO2:  99% 100%   Weight:  114 lb (51.7 kg)    Height:  5' 1\" (1.549 m)      Patient was assessed at bedside appropriate labs and imaging were ordered. Patient was given fluids and Zofran at bedside. Here today I reviewed all labs and imaging. Patient is COVID-positive. At this point I went and presented the patient with [facility] information. She elected to take the infusion. She is scheduled here at outpatient nursing on 1/15/2022 at 7 AM.  She is instructed to arrive 20 minutes early. An albuterol inhaler here as well as Decadron. She will be given prescription for Zofran and Decadron for at home. She is instructed to use vitamin C vitamin D and zinc to bolster her immune system. She is instructed to follow-up with a primary care physician and do so within the next 1 to 2 days. Patient and family at bedside understood and agreed with the plan. Patient is subsequently discharged home in stable condition. Patient has what appears to be COVID. Patient is instructed to use albuterol inhaler for any shortness of breath. She is instructed to use the steroids and nausea medication as prescribed. She is instructed to follow-up on 1/15/2022 at 7 AM here at outpatient nursing for her Regeneron treatment. Patient is instructed use Tylenol and Motrin for any fevers. Patient is instructed to follow-up with a primary care physician and to do so within the next 1 to 2 days. Patient is instructed to return to the nearest emergency room immediately for any new or worsening complaints.       CRITICAL CARE:   None    CONSULTS:  None    PROCEDURES:  None    FINAL IMPRESSION      1. COVID          DISPOSITION/PLAN   Discharge    PATIENT REFERRED TO:  MELLY Alcantara CNP  [address]  [address] [address] [phone]    Call in 2 days      Outpatient nursing    Go on 1/15/2022  Keep scheduled appointment at outpatient nursing at 7 AM for Regeneron      DISCHARGE MEDICATIONS:  New Prescriptions    No medications on file       (Please note that portions of this note were completed with a voice recognition program.  Efforts were made to edit the dictations but occasionally words are mis-transcribed.)    Dylon Barnhart, [facility],   01/10/22 0508

## 2022-01-10 NOTE — TELEPHONE ENCOUNTER
Noted, pt was given zofran for Nausea and vomiting and steroid and albuterol inhaler for wheezing and SOB, Have her follow with the infusion if symptoms worsen contact the office.

## 2022-01-11 ENCOUNTER — HOSPITAL ENCOUNTER (OUTPATIENT)
Dept: NURSING | Age: 62
Discharge: HOME | End: 2022-01-11
Payer: COMMERCIAL

## 2022-01-11 ENCOUNTER — TELEPHONE (OUTPATIENT)
Dept: FAMILY MEDICINE CLINIC | Age: 62
End: 2022-01-11

## 2022-01-11 VITALS
OXYGEN SATURATION: 98 % | DIASTOLIC BLOOD PRESSURE: 82 MMHG | RESPIRATION RATE: 16 BRPM | SYSTOLIC BLOOD PRESSURE: 156 MMHG | HEART RATE: 78 BPM | TEMPERATURE: 96.9 F

## 2022-01-11 LAB
EKG ATRIAL RATE: 79 BPM
EKG P AXIS: 73 DEGREES
EKG P-R INTERVAL: 128 MS
EKG Q-T INTERVAL: 370 MS
EKG QRS DURATION: 82 MS
EKG QTC CALCULATION (BAZETT): 424 MS
EKG R AXIS: 66 DEGREES
EKG T AXIS: 37 DEGREES
EKG VENTRICULAR RATE: 79 BPM

## 2022-01-11 PROCEDURE — 6360000002 HC RX W HCPCS: Performed by: EMERGENCY MEDICINE

## 2022-01-11 PROCEDURE — 2580000003 HC RX 258: Performed by: EMERGENCY MEDICINE

## 2022-01-11 PROCEDURE — 2500000003 HC RX 250 WO HCPCS: Performed by: EMERGENCY MEDICINE

## 2022-01-11 PROCEDURE — M0245 HC IV INFUSION BAMLANIVIMAB & ETESEVIMAB W/MONITORING: HCPCS

## 2022-01-11 RX ORDER — ACETAMINOPHEN 325 MG/1
650 TABLET ORAL
Status: ACTIVE | OUTPATIENT
Start: 2022-01-11 | End: 2022-01-11

## 2022-01-11 RX ORDER — ONDANSETRON 2 MG/ML
8 INJECTION INTRAMUSCULAR; INTRAVENOUS
Status: ACTIVE | OUTPATIENT
Start: 2022-01-11 | End: 2022-01-11

## 2022-01-11 RX ORDER — SODIUM CHLORIDE 0.9 % (FLUSH) 0.9 %
5-40 SYRINGE (ML) INJECTION PRN
Status: DISCONTINUED | OUTPATIENT
Start: 2022-01-11 | End: 2022-01-12 | Stop reason: HOSPADM

## 2022-01-11 RX ORDER — SODIUM CHLORIDE 9 MG/ML
25 INJECTION, SOLUTION INTRAVENOUS PRN
Status: DISCONTINUED | OUTPATIENT
Start: 2022-01-11 | End: 2022-01-12 | Stop reason: HOSPADM

## 2022-01-11 RX ORDER — ALBUTEROL SULFATE 90 UG/1
4 AEROSOL, METERED RESPIRATORY (INHALATION) PRN
Status: DISCONTINUED | OUTPATIENT
Start: 2022-01-11 | End: 2022-01-12 | Stop reason: HOSPADM

## 2022-01-11 RX ORDER — SODIUM CHLORIDE 9 MG/ML
INJECTION, SOLUTION INTRAVENOUS CONTINUOUS PRN
Status: ACTIVE | OUTPATIENT
Start: 2022-01-11 | End: 2022-01-11

## 2022-01-11 RX ORDER — SODIUM CHLORIDE 9 MG/ML
100 INJECTION, SOLUTION INTRAVENOUS CONTINUOUS PRN
Status: DISCONTINUED | OUTPATIENT
Start: 2022-01-11 | End: 2022-01-12 | Stop reason: HOSPADM

## 2022-01-11 RX ORDER — METHYLPREDNISOLONE SODIUM SUCCINATE 125 MG/2ML
125 INJECTION, POWDER, LYOPHILIZED, FOR SOLUTION INTRAMUSCULAR; INTRAVENOUS
Status: ACTIVE | OUTPATIENT
Start: 2022-01-11 | End: 2022-01-11

## 2022-01-11 RX ORDER — SODIUM CHLORIDE 0.9 % (FLUSH) 0.9 %
5-40 SYRINGE (ML) INJECTION EVERY 12 HOURS SCHEDULED
Status: DISCONTINUED | OUTPATIENT
Start: 2022-01-11 | End: 2022-01-12 | Stop reason: HOSPADM

## 2022-01-11 RX ORDER — DIPHENHYDRAMINE HYDROCHLORIDE 50 MG/ML
50 INJECTION INTRAMUSCULAR; INTRAVENOUS
Status: ACTIVE | OUTPATIENT
Start: 2022-01-11 | End: 2022-01-11

## 2022-01-11 RX ADMIN — SODIUM CHLORIDE: 9 INJECTION, SOLUTION INTRAVENOUS at 11:27

## 2022-01-11 NOTE — LETTER
[facility] [address]  [address] [facility] [address]  Phone: [phone]    [facility] EXAM ROOM 2 COVID INFUSION    January 11, 2022     MELLY Moore CNP  [address]  [address] [facility] [address]    Patient: Feliz Biggs   MR Number: [id number]   YOB: 1960   Date of Visit: 1/11/2022       Dear Ella Parra: Thank you for referring Cristo Rendon to me for evaluation/treatment. Below are the relevant portions of my assessment and plan of care. If you have questions, please do not hesitate to call me. I look forward to following Charlane Holstein along with you.     Sincerely,      [facility] EXAM ROOM 2 COVID INFUSION

## 2022-01-11 NOTE — TELEPHONE ENCOUNTER
Let her know she is scheduled on 1/15 at 7 am for the infusion please make sure she is aware and knows where to go .

## 2022-01-11 NOTE — TELEPHONE ENCOUNTER
----- Message from MELLY Green CNP sent at 1/11/2022  1:28 PM EST -----  Pt with positive covid, does she want antibody treatment?  Thanks a

## 2022-01-11 NOTE — PROGRESS NOTES
1120: Patient arrived ambulatory for Covid antibody infusion. PT RIGHTS AND RESPONSIBILITIES OFFERED TO PT. Patient provided with EUA to read, all questions answered. 1127: Bamlanivimab infusion started, patient tolerating well. Vitals as charted. 1158: Medication complete, will monitor 1 hour post op.     1300: No signs of reaction. Patient tolerated well. AVS reviewed with patient, voiced understanding. Patient discharged ambulatory.                                  _m___ Safety:       (Environmental)   [place] to environment   Ensure ID band is correct and in place/ allergy band as needed   Assess for fall risk   Initiate fall precautions as applicable (fall band, side rails, etc.)   Call light within reach   Bed in low position/ wheels locked    _m___ Pain:        Assess pain level and characteristics   Administer analgesics as ordered   Assess effectiveness of pain management and report to MD as needed    _m___ Knowledge Deficit:   Assess baseline knowledge   Provide teaching at level of understanding   Provide teaching via preferred learning method   Evaluate teaching effectiveness    _m___ Hemodynamic/Respiratory Status:       (Pre and Post Procedure Monitoring)   Assess/Monitor vital signs and LOC   Assess Baseline SpO2 prior to any sedation   Obtain weight/height   Assess vital signs/ LOC until patient meets discharge criteria   Monitor procedure site and notify MD of any issues

## 2022-01-15 ENCOUNTER — HOSPITAL ENCOUNTER (EMERGENCY)
Age: 62
Discharge: HOME | End: 2022-01-15

## 2022-01-15 NOTE — ED TRIAGE NOTES
Pt states she is here for infusion. She has paperwork which shows an infusion today in outpatient nursing. Her boyfriend states she already had an an infusion and needs a repeat covid test today. While on the phone calling to clarify this for the patient she left with her boyfriend.

## 2022-01-26 ENCOUNTER — TELEPHONE (OUTPATIENT)
Dept: FAMILY MEDICINE CLINIC | Age: 62
End: 2022-01-26

## 2022-02-06 DIAGNOSIS — F31.62 BIPOLAR DISORDER, CURRENT EPISODE MIXED, MODERATE (HCC): ICD-10-CM

## 2022-02-06 DIAGNOSIS — F22 PARANOIA (HCC): ICD-10-CM

## 2022-02-07 RX ORDER — LAMOTRIGINE 25 MG/1
TABLET ORAL
Qty: 360 TABLET | Refills: 1 | Status: SHIPPED | OUTPATIENT
Start: 2022-02-07 | End: 2022-07-25 | Stop reason: SDUPTHER

## 2022-02-07 RX ORDER — LISINOPRIL AND HYDROCHLOROTHIAZIDE 12.5; 1 MG/1; MG/1
TABLET ORAL
Qty: 90 TABLET | Refills: 3 | Status: SHIPPED | OUTPATIENT
Start: 2022-02-07 | End: 2022-07-25 | Stop reason: SDUPTHER

## 2022-02-19 ENCOUNTER — HOSPITAL ENCOUNTER (EMERGENCY)
Age: 62
Discharge: HOME | End: 2022-02-19
Attending: FAMILY MEDICINE
Payer: COMMERCIAL

## 2022-02-19 ENCOUNTER — APPOINTMENT (OUTPATIENT)
Dept: GENERAL RADIOLOGY | Age: 62
End: 2022-02-19
Payer: COMMERCIAL

## 2022-02-19 VITALS
BODY MASS INDEX: 22.66 KG/M2 | RESPIRATION RATE: 20 BRPM | HEART RATE: 88 BPM | HEIGHT: 61 IN | SYSTOLIC BLOOD PRESSURE: 138 MMHG | WEIGHT: 120 LBS | DIASTOLIC BLOOD PRESSURE: 74 MMHG | OXYGEN SATURATION: 97 % | TEMPERATURE: 98.5 F

## 2022-02-19 DIAGNOSIS — K29.00 ACUTE GASTRITIS WITHOUT HEMORRHAGE, UNSPECIFIED GASTRITIS TYPE: ICD-10-CM

## 2022-02-19 DIAGNOSIS — R05.9 COUGH: Primary | ICD-10-CM

## 2022-02-19 LAB
ALBUMIN SERPL-MCNC: 3.6 G/DL (ref 3.5–5.1)
ALP BLD-CCNC: 215 U/L (ref 38–126)
ALT SERPL-CCNC: 33 U/L (ref 11–66)
ANION GAP SERPL CALCULATED.3IONS-SCNC: 14 MEQ/L (ref 8–16)
AST SERPL-CCNC: 103 U/L (ref 5–40)
ATYPICAL LYMPHOCYTES: ABNORMAL %
BASOPHILIA: ABNORMAL
BASOPHILS # BLD: 0.4 %
BASOPHILS ABSOLUTE: 0 THOU/MM3 (ref 0–0.1)
BILIRUB SERPL-MCNC: 0.2 MG/DL (ref 0.3–1.2)
BILIRUBIN DIRECT: < 0.2 MG/DL (ref 0–0.3)
BUN BLDV-MCNC: 6 MG/DL (ref 7–22)
CALCIUM SERPL-MCNC: 8.3 MG/DL (ref 8.5–10.5)
CHLORIDE BLD-SCNC: 103 MEQ/L (ref 98–111)
CO2: 22 MEQ/L (ref 23–33)
CREAT SERPL-MCNC: 0.5 MG/DL (ref 0.4–1.2)
D-DIMER QUANTITATIVE: 580 NG/ML FEU (ref 0–500)
EOSINOPHIL # BLD: 2.7 %
EOSINOPHILS ABSOLUTE: 0.2 THOU/MM3 (ref 0–0.4)
ERYTHROCYTE [DISTWIDTH] IN BLOOD BY AUTOMATED COUNT: 12.4 % (ref 11.5–14.5)
ERYTHROCYTE [DISTWIDTH] IN BLOOD BY AUTOMATED COUNT: 46.1 FL (ref 35–45)
ETHYL ALCOHOL, SERUM: 0.27 %
GFR SERPL CREATININE-BSD FRML MDRD: > 90 ML/MIN/1.73M2
GLUCOSE BLD-MCNC: 118 MG/DL (ref 70–108)
HCT VFR BLD CALC: 42 % (ref 37–47)
HEMOGLOBIN: 14.4 GM/DL (ref 12–16)
IMMATURE GRANS (ABS): 0.03 THOU/MM3 (ref 0–0.07)
IMMATURE GRANULOCYTES: 0.3 %
LIPASE: 24.9 U/L (ref 5.6–51.3)
LYMPHOCYTES # BLD: 57.5 %
LYMPHOCYTES ABSOLUTE: 5.2 THOU/MM3 (ref 1–4.8)
MAGNESIUM: 1.8 MG/DL (ref 1.6–2.4)
MCH RBC QN AUTO: 34.9 PG (ref 26–33)
MCHC RBC AUTO-ENTMCNC: 34.3 GM/DL (ref 32.2–35.5)
MCV RBC AUTO: 101.7 FL (ref 81–99)
MONOCYTES # BLD: 10.4 %
MONOCYTES ABSOLUTE: 0.9 THOU/MM3 (ref 0.4–1.3)
NUCLEATED RED BLOOD CELLS: 0 /100 WBC
OSMOLALITY CALCULATION: 276.2 MOSMOL/KG (ref 275–300)
PLATELET # BLD: 371 THOU/MM3 (ref 130–400)
PLATELET ESTIMATE: ADEQUATE
PMV BLD AUTO: 9.5 FL (ref 9.4–12.4)
POTASSIUM REFLEX MAGNESIUM: 3.4 MEQ/L (ref 3.5–5.2)
PRO-BNP: 34.7 PG/ML (ref 0–900)
RBC # BLD: 4.13 MILL/MM3 (ref 4.2–5.4)
SCAN OF BLOOD SMEAR: NORMAL
SEG NEUTROPHILS: 28.7 %
SEGMENTED NEUTROPHILS ABSOLUTE COUNT: 2.6 THOU/MM3 (ref 1.8–7.7)
SODIUM BLD-SCNC: 139 MEQ/L (ref 135–145)
TOTAL PROTEIN: 6.9 G/DL (ref 6.1–8)
TROPONIN T: < 0.01 NG/ML
WBC # BLD: 9.1 THOU/MM3 (ref 4.8–10.8)

## 2022-02-19 PROCEDURE — 84484 ASSAY OF TROPONIN QUANT: CPT

## 2022-02-19 PROCEDURE — 83735 ASSAY OF MAGNESIUM: CPT

## 2022-02-19 PROCEDURE — 82077 ASSAY SPEC XCP UR&BREATH IA: CPT

## 2022-02-19 PROCEDURE — 6370000000 HC RX 637 (ALT 250 FOR IP): Performed by: STUDENT IN AN ORGANIZED HEALTH CARE EDUCATION/TRAINING PROGRAM

## 2022-02-19 PROCEDURE — 80048 BASIC METABOLIC PNL TOTAL CA: CPT

## 2022-02-19 PROCEDURE — 83880 ASSAY OF NATRIURETIC PEPTIDE: CPT

## 2022-02-19 PROCEDURE — 93005 ELECTROCARDIOGRAM TRACING: CPT | Performed by: STUDENT IN AN ORGANIZED HEALTH CARE EDUCATION/TRAINING PROGRAM

## 2022-02-19 PROCEDURE — 2500000003 HC RX 250 WO HCPCS: Performed by: STUDENT IN AN ORGANIZED HEALTH CARE EDUCATION/TRAINING PROGRAM

## 2022-02-19 PROCEDURE — 83690 ASSAY OF LIPASE: CPT

## 2022-02-19 PROCEDURE — 80076 HEPATIC FUNCTION PANEL: CPT

## 2022-02-19 PROCEDURE — 85379 FIBRIN DEGRADATION QUANT: CPT

## 2022-02-19 PROCEDURE — 71045 X-RAY EXAM CHEST 1 VIEW: CPT

## 2022-02-19 PROCEDURE — 85025 COMPLETE CBC W/AUTO DIFF WBC: CPT

## 2022-02-19 PROCEDURE — 99284 EMERGENCY DEPT VISIT MOD MDM: CPT

## 2022-02-19 PROCEDURE — 36415 COLL VENOUS BLD VENIPUNCTURE: CPT

## 2022-02-19 PROCEDURE — 96374 THER/PROPH/DIAG INJ IV PUSH: CPT

## 2022-02-19 RX ORDER — LANOLIN ALCOHOL/MO/W.PET/CERES
100 CREAM (GRAM) TOPICAL ONCE
Status: COMPLETED | OUTPATIENT
Start: 2022-02-19 | End: 2022-02-19

## 2022-02-19 RX ORDER — FOLIC ACID 1 MG/1
2 TABLET ORAL ONCE
Status: COMPLETED | OUTPATIENT
Start: 2022-02-19 | End: 2022-02-19

## 2022-02-19 RX ORDER — FAMOTIDINE 20 MG/1
20 TABLET, FILM COATED ORAL 2 TIMES DAILY
Qty: 28 TABLET | Refills: 0 | Status: SHIPPED | OUTPATIENT
Start: 2022-02-19 | End: 2022-07-25 | Stop reason: SDUPTHER

## 2022-02-19 RX ADMIN — LIDOCAINE HYDROCHLORIDE: 20 SOLUTION ORAL; TOPICAL at 16:09

## 2022-02-19 RX ADMIN — FAMOTIDINE 20 MG: 10 INJECTION INTRAVENOUS at 16:14

## 2022-02-19 RX ADMIN — Medication 100 MG: at 17:47

## 2022-02-19 RX ADMIN — FOLIC ACID 2 MG: 1 TABLET ORAL at 17:47

## 2022-02-19 ASSESSMENT — ENCOUNTER SYMPTOMS
CONSTIPATION: 0
COUGH: 1
NAUSEA: 0
CHEST TIGHTNESS: 0
STRIDOR: 0
PHOTOPHOBIA: 0
DIARRHEA: 0
WHEEZING: 0
RECTAL PAIN: 0
SHORTNESS OF BREATH: 1
ABDOMINAL PAIN: 1
VOMITING: 0
CHOKING: 0

## 2022-02-19 NOTE — ED PROVIDER NOTES
[place] ENCOUNTER          Pt Name: Devante Oliva  MRN: [id number]  [place] 1960  Date of evaluation: 2/19/2022  Treating Resident Physician: Kyrie Richards MD  Supervising Physician: [address]       Chief Complaint   Patient presents with    Shortness of Breath     History obtained from the patient. HISTORY OF PRESENT ILLNESS    HPI  Devante Oliva is a 57 y.o. female who presents to the emergency department for evaluation of shortness of breath. She had covid over a month ago and noticed \"rattling in her chest\" on and off since that time. She says she has been taking all prescribed medications, but does not have her papers with her so she doesn't remember what they are. She is not taking a blood thinner. Her primary concern is the noisy breathing, says the inhalers do not help this. Her breathing gets worse when she is upset. She also complains of pain in her left calf and says she has had blood clots before. No nausea/vomiting, no new cough, no hemoptysis. Since she had Covid last month, has had a persistent cough that comes and episodes. When she is coughing, she notes 4 out of 10 epigastric pain, not associated with any nausea, is associated with burning in the back of her throat. The patient has no other acute complaints at this time. REVIEW OF SYSTEMS   Review of Systems   Constitutional: Negative for appetite change, chills, diaphoresis, fatigue and fever. HENT: Negative. Eyes: Negative for photophobia and visual disturbance. Respiratory: Positive for cough and shortness of breath. Negative for choking, chest tightness, wheezing and stridor. Cardiovascular: Negative for chest pain, palpitations and leg swelling. Gastrointestinal: Positive for abdominal pain. Negative for constipation, diarrhea, nausea, rectal pain and vomiting.    Genitourinary: Negative for dysuria, flank pain, frequency, hematuria and urgency. Musculoskeletal: Negative for arthralgias, myalgias, neck pain and neck stiffness. Skin: Negative. Neurological: Negative for dizziness, syncope, light-headedness, numbness and headaches. Hematological: Negative for adenopathy. Does not bruise/bleed easily.           PAST MEDICAL AND SURGICAL HISTORY     Past Medical History:   Diagnosis Date    Anxiety     Asthma     [facility]     [facility] clotting disorder ([facility] 2014    was on coumadin until 2014    Breast cyst 7-8 yrs ago    rt    Depression     Duodenal ulcer 2019    GERD (gastroesophageal reflux disease)     Hyperlipidemia     Hypertension     [facility]      Past Surgical History:   Procedure Laterality Date    BREAST SURGERY  10 yrs    lump right     SECTION      x 1    ELBOW SURGERY Left 2013    Deep hardware removal with ulnar nerve decompression    FASCIOTOMY Left 2013    L arm    OTHER SURGICAL HISTORY  3/19/2013    LEFT ELBOW ORIF WITH WOUND CLOSURE    TUBAL LIGATION      UPPER GASTROINTESTINAL ENDOSCOPY  2019         MEDICATIONS     Current Facility-Administered Medications:     thiamine tablet 100 mg, 100 mg, Oral, Once, Naveen Loo MD    folic acid (FOLVITE) tablet 2 mg, 2 mg, Oral, Once, Naveen Loo MD    Current Outpatient Medications:     famotidine (PEPCID) 20 MG tablet, Take 1 tablet by mouth 2 times daily for 14 days, Disp: 28 tablet, Rfl: 0    lamoTRIgine (LAMICTAL) 25 MG tablet, TAKE 2 TABLETS BY MOUTH TWICE A DAY, Disp: 360 tablet, Rfl: 1    lisinopril-hydroCHLOROthiazide (PRINZIDE;ZESTORETIC) 10-12.5 MG per tablet, TAKE 1 TABLET BY MOUTH EVERY DAY, Disp: 90 tablet, Rfl: 3    ondansetron (ZOFRAN) 4 MG tablet, Take 1 tablet by mouth 3 times daily as needed for Nausea or Vomiting, Disp: 15 tablet, Rfl: 0    folic acid (FOLVITE) 1 MG tablet, Take 1 tablet by mouth daily, Disp: 90 tablet, Rfl: 4   pantoprazole (PROTONIX) 40 MG tablet, TAKE 1 TABLET BY MOUTH EVERY DAY, Disp: 90 tablet, Rfl: 3    megestrol (MEGACE) 20 MG tablet, TAKE 1 TABLET BY MOUTH EVERY DAY, Disp: 90 tablet, Rfl: 3    loratadine (CLARITIN) 10 MG tablet, TAKE 1 TABLET BY MOUTH EVERY DAY, Disp: 30 tablet, Rfl: 11    thiamine mononitrate 100 MG tablet, TAKE 1 TABLET BY MOUTH EVERY DAY, Disp: 90 tablet, Rfl: 3    simvastatin (ZOCOR) 40 MG tablet, TAKE 1 TABLET BY MOUTH EVERY DAY AT NIGHT, Disp: 90 tablet, Rfl: 3    thiamine mononitrate 100 MG tablet, TAKE 1 TABLET BY MOUTH EVERY DAY, Disp: , Rfl:     sertraline (ZOLOFT) 50 MG tablet, TAKE 1 TABLET BY MOUTH EVERY DAY, Disp: 90 tablet, Rfl: 3    fluticasone (FLONASE) 50 MCG/ACT nasal spray, 2 sprays by Nasal route daily, Disp: 1 Bottle, Rfl: 5    ASPIRIN LOW DOSE 81 MG EC tablet, TAKE 1 TABLET BY MOUTH DAILY, Disp: 90 tablet, Rfl: 3    albuterol sulfate HFA (PROVENTIL HFA) 108 (90 Base) MCG/ACT inhaler, Inhale 2 puffs into the lungs every 4 hours as needed for Wheezing or Shortness of Breath (Space out to every 6 hours as symptoms improve) Space out to every 6 hours as symptoms improve., Disp: 1 Inhaler, Rfl: 0      SOCIAL HISTORY     Social History     Social History Narrative    Not on file     Social History     Tobacco Use    Smoking status: Current Some Day Smoker     Packs/day: 0.00     Years: 35.00     Pack years: 0.00     Types: Cigarettes    Smokeless tobacco: Never Used   Substance Use Topics    Alcohol use:  Yes     Alcohol/week: 5.0 standard drinks     Types: 5 Glasses of wine per week     Comment: two bottles of wine per week    Drug use: No         ALLERGIES     Allergies   Allergen Reactions    Other      Other reaction(s): AOF    Pork-Derived Products Nausea Only         FAMILY HISTORY     Family History   Problem Relation Age of Onset    Cancer Mother     Diabetes Father     Cancer Maternal Uncle         bone    Cancer Maternal Grandmother         uterine    Diabetes Maternal Grandfather     Cancer Brother         Colon Cancer         PREVIOUS RECORDS   Previous records reviewed: Medical, past surgical, medications, allergies        PHYSICAL EXAM     ED Triage Vitals [02/19/22 1522]   BP Temp Temp Source Pulse Resp SpO2 Height Weight   (!) 169/87 98.5 °F (36.9 °C) Oral 90 20 97 % 5' 1\" (1.549 m) 120 lb (54.4 kg)     Initial vital signs and nursing assessment reviewed and normal. Body mass index is 22.67 kg/m². Pulsoximetry is normal per my interpretation. Additional Vital Signs:  Vitals:    02/19/22 1621   BP: 138/74   Pulse: 88   Resp: 20   Temp:    SpO2: 97%       Physical Exam  Constitutional:       General: She is not in acute distress. Appearance: She is well-developed. She is not ill-appearing, toxic-appearing or diaphoretic. HENT:      Head: Normocephalic and atraumatic. Mouth/Throat:      Mouth: Mucous membranes are moist.      Pharynx: Oropharynx is clear. Eyes:      Extraocular Movements: Extraocular movements intact. Pupils: Pupils are equal, round, and reactive to light. Neck:      Vascular: No JVD. Cardiovascular:      Rate and Rhythm: Normal rate and regular rhythm. No extrasystoles are present. Heart sounds: No murmur heard. No gallop. Pulmonary:      Effort: Pulmonary effort is normal. No tachypnea, bradypnea, accessory muscle usage or respiratory distress. Breath sounds: Normal breath sounds. No decreased breath sounds, wheezing, rhonchi or rales. Chest:      Chest wall: No mass, tenderness or edema. There is no dullness to percussion. Abdominal:      Palpations: Abdomen is soft. Tenderness: There is abdominal tenderness (.  Epigastrium). There is no guarding or rebound. Musculoskeletal:      Cervical back: Normal range of motion and neck supple. Right lower leg: No tenderness. No edema. Left lower leg: Tenderness (1 pinpoint area of tenderness on the medial mid shin.   No swelling, no palpable cord, no color change. This area has previously been dopplered and was negative.) present. No edema. Skin:     General: Skin is warm and dry. Capillary Refill: Capillary refill takes less than 2 seconds. Neurological:      General: No focal deficit present. Mental Status: She is alert and oriented to person, place, and time. MEDICAL DECISION MAKING   Initial Assessment:   1. Is a 56-year-old female, history of alcohol use disorder, presenting for cough over the past month, as well as epigastric pain. Physical exam significant for pleasant appearing female, in no acute distress, tender to epigastrium. Plan:    CBC, CMP, troponin, EKG, chest x-ray, D-dimer   Dimer is below age-adjusted limit of 620   Labs significant for elevated MCV, elevated ethanol   Abdominal pain completely resolved with Maalox and Pepcid   Discharged to home with son. Strict return precautions, follow-up discussed with son and patient        ED RESULTS   Laboratory results:  Labs Reviewed   CBC WITH AUTO DIFFERENTIAL - Abnormal; Notable for the following components:       Result Value    RBC 4.13 (*)     .7 (*)     MCH 34.9 (*)     RDW-SD 46.1 (*)     Lymphocytes Absolute 5.2 (*)     All other components within normal limits   BASIC METABOLIC PANEL W/ REFLEX TO MG FOR LOW K - Abnormal; Notable for the following components:    Potassium reflex Magnesium 3.4 (*)     CO2 22 (*)     Glucose 118 (*)     BUN 6 (*)     Calcium 8.3 (*)     All other components within normal limits   HEPATIC FUNCTION PANEL - Abnormal; Notable for the following components:     Total Bilirubin 0.2 (*)     Alkaline Phosphatase 215 (*)      (*)     All other components within normal limits   D-DIMER, QUANTITATIVE - Abnormal; Notable for the following components:    D-Dimer, Quant 580.00 (*)     All other components within normal limits   TROPONIN   BRAIN NATRIURETIC PEPTIDE   LIPASE   ETHANOL   GLOMERULAR FILTRATION RATE, ESTIMATED   MAGNESIUM   OSMOLALITY   ANION GAP   SCAN OF BLOOD SMEAR       Radiologic studies results:  XR CHEST PORTABLE   Final Result   1. No acute cardiopulmonary findings. **This report has been created using voice recognition software. It may contain minor errors which are inherent in voice recognition technology. **      Final report electronically signed by Dr. Martha Barrett on 2/19/2022 4:16 PM          ED Medications administered this visit:   Medications   thiamine tablet 100 mg (has no administration in time range)   folic acid (FOLVITE) tablet 2 mg (has no administration in time range)   aluminum & magnesium hydroxide-simethicone (MAALOX) 30 mL, lidocaine viscous hcl (XYLOCAINE) 5 mL (GI COCKTAIL) ( Oral Given 2/19/22 1609)   famotidine (PEPCID) injection 20 mg (20 mg IntraVENous Given 2/19/22 1614)         ED COURSE         Strict return precautions and follow up instructions were discussed with the patient prior to discharge, with which the patient agrees. MEDICATION CHANGES     New Prescriptions    FAMOTIDINE (PEPCID) 20 MG TABLET    Take 1 tablet by mouth 2 times daily for 14 days         FINAL DISPOSITION     Final diagnoses:   Cough   Acute gastritis without hemorrhage, unspecified gastritis type     Condition: condition: good  Dispo: Discharge to home      This transcription was electronically signed. Parts of this transcriptions may have been dictated by use of voice recognition software and electronically transcribed, and parts may have been transcribed with the assistance of an ED scribe. The transcription may contain errors not detected in proofreading. Please refer to my supervising physician's documentation if my documentation differs.     Electronically Signed: Meet Calderon MD, 02/19/22, 5:30 PM          Meet Calderon MD  Resident  02/19/22 0147

## 2022-02-20 LAB
EKG ATRIAL RATE: 88 BPM
EKG P AXIS: 41 DEGREES
EKG P-R INTERVAL: 128 MS
EKG Q-T INTERVAL: 354 MS
EKG QRS DURATION: 76 MS
EKG QTC CALCULATION (BAZETT): 428 MS
EKG R AXIS: 40 DEGREES
EKG T AXIS: 25 DEGREES
EKG VENTRICULAR RATE: 88 BPM

## 2022-04-03 DIAGNOSIS — F34.1 DYSTHYMIA: ICD-10-CM

## 2022-04-15 ENCOUNTER — HOSPITAL ENCOUNTER (EMERGENCY)
Age: 62
Discharge: HOME | End: 2022-04-15
Payer: COMMERCIAL

## 2022-04-15 VITALS
HEART RATE: 78 BPM | RESPIRATION RATE: 20 BRPM | DIASTOLIC BLOOD PRESSURE: 94 MMHG | SYSTOLIC BLOOD PRESSURE: 124 MMHG | OXYGEN SATURATION: 95 % | TEMPERATURE: 97.9 F

## 2022-04-15 DIAGNOSIS — L02.511 FINGER PULP ABSCESS, RIGHT: Primary | ICD-10-CM

## 2022-04-15 PROCEDURE — 6370000000 HC RX 637 (ALT 250 FOR IP): Performed by: PHYSICIAN ASSISTANT

## 2022-04-15 PROCEDURE — 99282 EMERGENCY DEPT VISIT SF MDM: CPT

## 2022-04-15 PROCEDURE — 10061 I&D ABSCESS COMP/MULTIPLE: CPT

## 2022-04-15 RX ORDER — CEPHALEXIN 250 MG/1
500 CAPSULE ORAL 4 TIMES DAILY
Qty: 80 CAPSULE | Refills: 0 | Status: SHIPPED | OUTPATIENT
Start: 2022-04-15 | End: 2022-04-17 | Stop reason: ALTCHOICE

## 2022-04-15 RX ORDER — LIDOCAINE HYDROCHLORIDE 10 MG/ML
5 INJECTION, SOLUTION EPIDURAL; INFILTRATION; INTRACAUDAL; PERINEURAL ONCE
Status: DISCONTINUED | OUTPATIENT
Start: 2022-04-15 | End: 2022-04-15 | Stop reason: HOSPADM

## 2022-04-15 RX ORDER — CIPROFLOXACIN 500 MG/1
500 TABLET, FILM COATED ORAL 2 TIMES DAILY
Qty: 20 TABLET | Refills: 0 | Status: SHIPPED | OUTPATIENT
Start: 2022-04-15 | End: 2022-04-25

## 2022-04-15 RX ORDER — OXYCODONE HYDROCHLORIDE AND ACETAMINOPHEN 5; 325 MG/1; MG/1
1 TABLET ORAL ONCE
Status: COMPLETED | OUTPATIENT
Start: 2022-04-15 | End: 2022-04-15

## 2022-04-15 RX ADMIN — OXYCODONE AND ACETAMINOPHEN 1 TABLET: 5; 325 TABLET ORAL at 15:37

## 2022-04-15 ASSESSMENT — PAIN DESCRIPTION - LOCATION: LOCATION: HAND

## 2022-04-15 ASSESSMENT — PAIN - FUNCTIONAL ASSESSMENT: PAIN_FUNCTIONAL_ASSESSMENT: 0-10

## 2022-04-15 ASSESSMENT — PAIN SCALES - GENERAL: PAINLEVEL_OUTOF10: 8

## 2022-04-15 ASSESSMENT — PAIN DESCRIPTION - ORIENTATION: ORIENTATION: RIGHT

## 2022-04-15 ASSESSMENT — PAIN DESCRIPTION - DESCRIPTORS: DESCRIPTORS: ACHING

## 2022-04-15 ASSESSMENT — ENCOUNTER SYMPTOMS: COLOR CHANGE: 1

## 2022-04-15 ASSESSMENT — PAIN DESCRIPTION - FREQUENCY: FREQUENCY: CONTINUOUS

## 2022-04-15 ASSESSMENT — PAIN DESCRIPTION - PAIN TYPE: TYPE: ACUTE PAIN

## 2022-04-15 NOTE — ED NOTES
Patient to ED for possible finger infection. patient states she got a splinter in her right middle finger on Monday. patient removed splinter a splinter that day.  Patient now having swelling and pain       Liban Jean-Baptiste RN  04/15/22 8511

## 2022-04-15 NOTE — ED PROVIDER NOTES
[facility]  eMERGENCY dEPARTMENT eNCOUnter          [address] [facility]       Chief Complaint   Patient presents with    Finger Injury     right middle finger       Nurses Notes reviewed and I agree except as noted inthe HPI. HISTORY OF PRESENT ILLNESS    Nathaniel Dan is a 57 y.o. female who presents to the Emergency Department for the evaluation of right finger swelling. Patient states that 2 or 3 days ago she was mulching when she was stabbed in the right third finger by a piece of red mulch. States she pulled it out and continued working. She has had some numbness to the distal digits at that time with pain and was noted to have increased swelling over the past 24 hours. Reports chills without fever. No history of diabetes. She was concerned of infection so she came to the ED for further evaluation. The HPI was provided by the patient. REVIEW OF SYSTEMS     Review of Systems   Musculoskeletal: Positive for arthralgias. Skin: Positive for color change. All other systems reviewed and are negative. PAST MEDICAL HISTORY    has a past medical history of Anxiety, Asthma, Bipolar disorder ([address] Blood clotting disorder ([address] Breast cyst, Depression, Duodenal ulcer, GERD (gastroesophageal reflux disease), Hyperlipidemia, Hypertension, and Schizophrenia ([address] SURGICAL HISTORY      has a past surgical history that includes  section; Breast surgery (10 yrs); Tubal ligation; fasciotomy (Left, 2013); other surgical history (3/19/2013); Elbow surgery (Left, 2013); and Upper gastrointestinal endoscopy (2019).     CURRENT MEDICATIONS       Discharge Medication List as of 4/15/2022  5:34 PM      CONTINUE these medications which have NOT CHANGED    Details   sertraline (ZOLOFT) 50 MG tablet TAKE 1 TABLET BY MOUTH EVERY DAY, Disp-90 tablet, R-3Normal      famotidine (PEPCID) 20 MG tablet Take 1 tablet by mouth 2 times daily for 14 days, Disp-28 tablet, R-0Normal      lamoTRIgine (LAMICTAL) 25 MG tablet TAKE 2 TABLETS BY MOUTH TWICE A DAY, Disp-360 tablet, R-1Normal      lisinopril-hydroCHLOROthiazide (PRINZIDE;ZESTORETIC) 10-12.5 MG per tablet TAKE 1 TABLET BY MOUTH EVERY DAY, Disp-90 tablet, R-3Normal      ondansetron (ZOFRAN) 4 MG tablet Take 1 tablet by mouth 3 times daily as needed for Nausea or Vomiting, Disp-15 tablet, [id number]      folic acid (FOLVITE) 1 MG tablet Take 1 tablet by mouth daily, Disp-90 tablet, R-4Normal      pantoprazole (PROTONIX) 40 MG tablet TAKE 1 TABLET BY MOUTH EVERY DAY, Disp-90 tablet, R-3Normal      megestrol (MEGACE) 20 MG tablet TAKE 1 TABLET BY MOUTH EVERY DAY, Disp-90 tablet, R-3Normal      loratadine (CLARITIN) 10 MG tablet TAKE 1 TABLET BY MOUTH EVERY DAY, Disp-30 tablet, R-11Normal      !! thiamine mononitrate 100 MG tablet TAKE 1 TABLET BY MOUTH EVERY DAY, Disp-90 tablet, R-3Normal      simvastatin (ZOCOR) 40 MG tablet TAKE 1 TABLET BY MOUTH EVERY DAY AT NIGHT, Disp-90 tablet, R-3Normal      !! thiamine mononitrate 100 MG tablet TAKE 1 TABLET BY MOUTH EVERY DAYHistorical Med      fluticasone (FLONASE) 50 MCG/ACT nasal spray 2 sprays by Nasal route daily, Disp-1 Bottle,R-5Normal      ASPIRIN LOW DOSE 81 MG EC tablet TAKE 1 TABLET BY MOUTH DAILY, Disp-90 tablet, R-3Normal      albuterol sulfate HFA (PROVENTIL HFA) 108 (90 Base) MCG/ACT inhaler Inhale 2 puffs into the lungs every 4 hours as needed for Wheezing or Shortness of Breath (Space out to every 6 hours as symptoms improve) Space out to every 6 hours as symptoms improve., Disp-1 Inhaler, R-0Print       !! - Potential duplicate medications found. Please discuss with provider. ALLERGIES     is allergic to other and pork-derived products. FAMILY HISTORY     She indicated that her mother is . She indicated that her father is . She indicated that her brother is alive. She indicated that the status of her maternal grandmother is unknown.  She indicated that the status of her maternal grandfather is unknown. She indicated that the status of her maternal uncle is unknown.   family history includes Cancer in her brother, maternal grandmother, maternal uncle, and mother; Diabetes in her father and maternal grandfather. SOCIAL HISTORY      reports that she has been smoking cigarettes. She has been smoking about 0.00 packs per day for the past 35.00 years. She has never used smokeless tobacco. She reports current alcohol use of about 5.0 standard drinks of alcohol per week. She reports that she does not use drugs. PHYSICAL EXAM     INITIAL VITALS:  oral temperature is 97.9 °F (36.6 °C). Her blood pressure is 124/94 (abnormal) and her pulse is 78. Her respiration is 20 and oxygen saturation is 95%. Physical Exam  Vitals and nursing note reviewed. Constitutional:       Appearance: Normal appearance. HENT:      Head: Normocephalic and atraumatic. Eyes:      Conjunctiva/sclera: Conjunctivae normal.   Cardiovascular:      Rate and Rhythm: Normal rate. Pulmonary:      Effort: Pulmonary effort is normal. No respiratory distress. Musculoskeletal:      Cervical back: Normal range of motion. Comments: Swelling, erythema, tenderness noted to the dorsal surface of the right third finger proximal to the nail, extending to the DIP joint. There is no tenderness, swelling, induration or erythema extending over the middle phalanx of the affected digit. Range of motion is limited at the DIP in comparison to the left, but is also noted to be limited in the other digits of the right hand as well. There is fluctuance overlying the area of erythema with patient reporting absent sensation over the abscess and over the distal aspect of the digit. Capillary refill intact. Skin:     General: Skin is warm. Neurological:      Mental Status: She is alert.    Psychiatric:         Mood and Affect: Mood normal.                 DIFFERENTIAL DIAGNOSIS: Differential diagnoses are discussed    DIAGNOSTIC RESULTS     EKG: All EKG's are interpreted by the Emergency Department Physician who either signs or Co-signsthis chart in the absence of a cardiologist.          RADIOLOGY: non-plain film images(s) such as CT, Ultrasound and MRI are read by the radiologist.    No orders to display       LABS:    Labs Reviewed - No data to display    EMERGENCY DEPARTMENT COURSE:   Vitals:    Vitals:    04/15/22 1516   BP: (!) 124/94   Pulse: 78   Resp: 20   Temp: 97.9 °F (36.6 °C)   TempSrc: Oral   SpO2: 95%      5:25 PM EDT: The patient was seen and evaluated. Patient presents with reassuring vital signs for complaints of right third finger infection. Ongoing for 2 to 3 days, patient denies history of diabetes. She is nontoxic-appearing. Discussed with the orthopedic service. They are agreeable with I&D here today, oral antibiotics and follow-up in their office Monday. Should symptoms worsen in the interim, they recommend repeat ED evaluation. Patient is agreeable with this. She was given Percocet in the department for pain. She tolerated incision and drainage well with moderate amount of purulent material noted. Return precautions were discussed with the patient will be discharged with Keflex and Cipro given the nature of the initial injury.     CRITICAL CARE:   None    CONSULTS:  Nely Johnston PA-C, orthopedics    PROCEDURES:  Incision/Drainage    Date/Time: 4/15/2022 5:26 PM  Performed by: Manuel Haywood PA-C  Authorized by: Manuel Haywood PA-C     Consent:     Consent obtained:  Verbal    Consent given by:  Patient    Risks discussed:  Bleeding and pain    Alternatives discussed:  Alternative treatment  Location:     Type:  Abscess    Size:  2 cm x 1 cm    Location:  Upper extremity    Upper extremity location:  Finger    Finger location:  R long finger  Pre-procedure details:     Skin preparation:  Antiseptic wash  Anesthesia (see MAR for exact dosages): Anesthesia method:  Nerve block    Block needle gauge:  27 G    Block anesthetic:  Lidocaine 1% w/o epi    Block technique:  Digital block- 3 sided ring    Block injection procedure:  Anatomic landmarks identified, anatomic landmarks palpated, introduced needle and negative aspiration for blood    Block outcome:  Anesthesia achieved  Procedure type:     Complexity:  Simple  Procedure details:     Needle aspiration: no      Incision types:  Single straight    Incision depth:  Subcutaneous    Scalpel blade:  11    Wound management:  Probed and deloculated    Drainage:  Purulent    Drainage amount: Moderate    Wound treatment:  Wound left open    Packing materials:  None  Post-procedure details:     Patient tolerance of procedure: Tolerated well, no immediate complications          FINAL IMPRESSION      1. Finger pulp abscess, right          DISPOSITION/PLAN   Discharge    PATIENT REFERRED TO:  [facility]  [address] [place] [phone]  Go in 3 days  Walk-in clinic open Monday-Friday 7:30 AM-3:30 PM    [facility] EMERGENCY DEPT  [address] [facility]  [phone]    If symptoms worsen      DISCHARGEMEDICATIONS:  Discharge Medication List as of 4/15/2022  5:34 PM      START taking these medications    Details   cephALEXin (KEFLEX) 250 MG capsule Take 2 capsules by mouth 4 times daily for 10 days, Disp-80 capsule, R-0Normal      ciprofloxacin (CIPRO) 500 MG tablet Take 1 tablet by mouth 2 times daily for 10 days, Disp-20 tablet, R-0Normal             (Please note that portions of this note were completedwith a voice recognition program.  Efforts were made to edit the dictations but occasionally words are mis-transcribed.)       Tello Greer PA-C  04/15/22 1940

## 2022-04-17 ENCOUNTER — HOSPITAL ENCOUNTER (EMERGENCY)
Age: 62
Discharge: HOME | End: 2022-04-17
Payer: COMMERCIAL

## 2022-04-17 VITALS
OXYGEN SATURATION: 97 % | RESPIRATION RATE: 18 BRPM | SYSTOLIC BLOOD PRESSURE: 136 MMHG | WEIGHT: 115 LBS | HEART RATE: 83 BPM | BODY MASS INDEX: 21.71 KG/M2 | DIASTOLIC BLOOD PRESSURE: 80 MMHG | HEIGHT: 61 IN | TEMPERATURE: 97.8 F

## 2022-04-17 DIAGNOSIS — L03.113 CELLULITIS OF RIGHT UPPER EXTREMITY: Primary | ICD-10-CM

## 2022-04-17 PROCEDURE — 99283 EMERGENCY DEPT VISIT LOW MDM: CPT

## 2022-04-17 RX ORDER — SULFAMETHOXAZOLE AND TRIMETHOPRIM 800; 160 MG/1; MG/1
1 TABLET ORAL 2 TIMES DAILY
Qty: 20 TABLET | Refills: 0 | Status: SHIPPED | OUTPATIENT
Start: 2022-04-17 | End: 2022-04-27

## 2022-04-17 RX ORDER — OXYCODONE HYDROCHLORIDE AND ACETAMINOPHEN 5; 325 MG/1; MG/1
1 TABLET ORAL EVERY 6 HOURS PRN
Qty: 12 TABLET | Refills: 0 | Status: SHIPPED | OUTPATIENT
Start: 2022-04-17 | End: 2022-04-20

## 2022-04-17 RX ORDER — LIDOCAINE 40 MG/G
CREAM TOPICAL PRN
Status: DISCONTINUED | OUTPATIENT
Start: 2022-04-17 | End: 2022-04-17

## 2022-04-17 RX ORDER — LIDOCAINE 40 MG/G
CREAM TOPICAL
Qty: 30 G | Refills: 0 | Status: SHIPPED | OUTPATIENT
Start: 2022-04-17

## 2022-04-17 RX ORDER — OXYCODONE HYDROCHLORIDE AND ACETAMINOPHEN 5; 325 MG/1; MG/1
1 TABLET ORAL ONCE
Status: DISCONTINUED | OUTPATIENT
Start: 2022-04-17 | End: 2022-04-17

## 2022-04-17 NOTE — ED TRIAGE NOTES
Pt presents to the ED via triage with c/o finger and arm pain. Pt states she was seen in the ED yesterday for the same thing and got a splint for her finger. Pt states the pain began to shoot up her arm. Pt states she took oxycodone yesterday while she was at the ED and has not taken anything since.  Pt states pain is 8 out of 10 at this time

## 2022-04-21 NOTE — ED PROVIDER NOTES
[facility]  eMERGENCY dEPARTMENT eNCOUnter          [address] [facility]       Chief Complaint   Patient presents with    Finger Pain    Arm Pain       Nurses Notes reviewed and I agree except as noted inthe HPI. HISTORY OF PRESENT ILLNESS    Azra Shipley is a 57 y.o. female who presents to the Emergency Department for the evaluation of increased finger pain. Patient was seen in the ED 2 days ago for concern of infection to the right third finger. She had incision and drainage performed in the emergency department at that time, started on antibiotics and was scheduled to follow-up with orthopedics tomorrow. However, her symptoms have worsened in the interim. She reports constant, throbbing pain to the third digit with radiation now into the forearm, where is previously isolated in the finger. She denies any fevers, chills. Denies any drainage. No new numbness but she reports being unable to flex the affected digit. Reports compliance with the previously prescribed antibiotics. The HPI was provided by the patient. REVIEW OF SYSTEMS     Review of Systems   Musculoskeletal: Positive for arthralgias and joint swelling. Neurological: Positive for weakness. All other systems reviewed and are negative. PAST MEDICAL HISTORY    has a past medical history of Anxiety, Asthma, Bipolar disorder ([address] Blood clotting disorder ([address] Breast cyst, Depression, Duodenal ulcer, GERD (gastroesophageal reflux disease), Hyperlipidemia, Hypertension, and Schizophrenia ([address] SURGICAL HISTORY      has a past surgical history that includes  section; Breast surgery (10 yrs); Tubal ligation; fasciotomy (Left, 2013); other surgical history (3/19/2013); Elbow surgery (Left, 2013); and Upper gastrointestinal endoscopy (2019).     CURRENT MEDICATIONS       Discharge Medication List as of 2022  4:16 PM      CONTINUE these medications which have NOT CHANGED Details   ciprofloxacin (CIPRO) 500 MG tablet Take 1 tablet by mouth 2 times daily for 10 days, Disp-20 tablet, R-0Normal      sertraline (ZOLOFT) 50 MG tablet TAKE 1 TABLET BY MOUTH EVERY DAY, Disp-90 tablet, R-3Normal      famotidine (PEPCID) 20 MG tablet Take 1 tablet by mouth 2 times daily for 14 days, Disp-28 tablet, R-0Normal      lamoTRIgine (LAMICTAL) 25 MG tablet TAKE 2 TABLETS BY MOUTH TWICE A DAY, Disp-360 tablet, R-1Normal      lisinopril-hydroCHLOROthiazide (PRINZIDE;ZESTORETIC) 10-12.5 MG per tablet TAKE 1 TABLET BY MOUTH EVERY DAY, Disp-90 tablet, R-3Normal      ondansetron (ZOFRAN) 4 MG tablet Take 1 tablet by mouth 3 times daily as needed for Nausea or Vomiting, Disp-15 tablet, [id number]      folic acid (FOLVITE) 1 MG tablet Take 1 tablet by mouth daily, Disp-90 tablet, R-4Normal      pantoprazole (PROTONIX) 40 MG tablet TAKE 1 TABLET BY MOUTH EVERY DAY, Disp-90 tablet, R-3Normal      megestrol (MEGACE) 20 MG tablet TAKE 1 TABLET BY MOUTH EVERY DAY, Disp-90 tablet, R-3Normal      loratadine (CLARITIN) 10 MG tablet TAKE 1 TABLET BY MOUTH EVERY DAY, Disp-30 tablet, R-11Normal      !! thiamine mononitrate 100 MG tablet TAKE 1 TABLET BY MOUTH EVERY DAY, Disp-90 tablet, R-3Normal      simvastatin (ZOCOR) 40 MG tablet TAKE 1 TABLET BY MOUTH EVERY DAY AT NIGHT, Disp-90 tablet, R-3Normal      !! thiamine mononitrate 100 MG tablet TAKE 1 TABLET BY MOUTH EVERY DAYHistorical Med      fluticasone (FLONASE) 50 MCG/ACT nasal spray 2 sprays by Nasal route daily, Disp-1 Bottle,R-5Normal      ASPIRIN LOW DOSE 81 MG EC tablet TAKE 1 TABLET BY MOUTH DAILY, Disp-90 tablet, R-3Normal      albuterol sulfate HFA (PROVENTIL HFA) 108 (90 Base) MCG/ACT inhaler Inhale 2 puffs into the lungs every 4 hours as needed for Wheezing or Shortness of Breath (Space out to every 6 hours as symptoms improve) Space out to every 6 hours as symptoms improve., Disp-1 Inhaler, R-0Print       !! - Potential duplicate medications found.  Please discuss with provider. ALLERGIES     is allergic to other and pork-derived products. FAMILY HISTORY     She indicated that her mother is . She indicated that her father is . She indicated that her brother is alive. She indicated that the status of her maternal grandmother is unknown. She indicated that the status of her maternal grandfather is unknown. She indicated that the status of her maternal uncle is unknown.   family history includes Cancer in her brother, maternal grandmother, maternal uncle, and mother; Diabetes in her father and maternal grandfather. SOCIAL HISTORY      reports that she has been smoking cigarettes. She has been smoking about 0.00 packs per day for the past 35.00 years. She has never used smokeless tobacco. She reports current alcohol use of about 5.0 standard drinks of alcohol per week. She reports that she does not use drugs. PHYSICAL EXAM     INITIAL VITALS:  height is 5' 1\" (1.549 m) and weight is 115 lb (52.2 kg). Her oral temperature is 97.8 °F (36.6 °C). Her blood pressure is 136/80 and her pulse is 83. Her respiration is 18 and oxygen saturation is 97%. Physical Exam  Vitals reviewed. Constitutional:       Appearance: Normal appearance. HENT:      Head: Normocephalic and atraumatic. Eyes:      Conjunctiva/sclera: Conjunctivae normal.   Cardiovascular:      Rate and Rhythm: Normal rate. Pulmonary:      Effort: Pulmonary effort is normal. No respiratory distress. Musculoskeletal:      Cervical back: Normal range of motion. Comments: Right third digit with continued swelling, tenderness along the distal phalanx. She is tender along the middle and proximal phalanx dorsally as well with range of motion significantly limited in regards to flexion of the affected digit, see photo below for maximal flexion tolerated by patient.   There is no streaking erythema, warmth but patient does have pain radiating up to the forearm during exam.  She is neurovascularly intact otherwise. Skin:     General: Skin is warm and dry. Neurological:      General: No focal deficit present. Mental Status: She is alert. Psychiatric:         Mood and Affect: Mood normal.                 DIFFERENTIAL DIAGNOSIS:   Differential diagnoses are discussed    DIAGNOSTIC RESULTS     EKG: All EKG's are interpreted by the Emergency Department Physician who either signs or Co-signsthis chart in the absence of a cardiologist.      RADIOLOGY: non-plain film images(s) such as CT, Ultrasound and MRI are read by the radiologist.    No orders to display       LABS:    Labs Reviewed - No data to display    EMERGENCY DEPARTMENT COURSE:   Vitals:    Vitals:    04/17/22 1452   BP: 136/80   Pulse: 83   Resp: 18   Temp: 97.8 °F (36.6 °C)   TempSrc: Oral   SpO2: 97%   Weight: 115 lb (52.2 kg)   Height: 5' 1\" (1.549 m)      4:52 PM EDT: The patient was seen and evaluated. Patient presents with reassuring vital signs for repeat evaluation of right third finger. Reports worsening of symptoms despite 2 days of antibiotic treatment after incision and drainage on previous ED visit. Discussed again with the orthopedic service, who is aware of the patient on her previous visit. At this time, they would like us to switch Keflex to Bactrim and have her remain n.p.o. after midnight, follow-up in their office at 8 AM tomorrow for repeat evaluation and further management. They advised pain control in the interim. Patient was treated with Percocet in the department, will be discharged home with Percocet as well as lidocaine cream and was advised of the recommendation to switch Keflex to Bactrim. We will have her continue Cipro in the interim. She is agreeable with the above plan. Return precautions were discussed and she denied further needs upon discharge. CRITICAL CARE:   None    CONSULTS:  Rick Vaughn PA-C, orthopedics    PROCEDURES:  None    FINAL IMPRESSION      1.  Cellulitis of right upper extremity          DISPOSITION/PLAN   Discharge    PATIENT REFERRED TO:  [facility]  [address] [facility] [phone]  In 1 day  Go to the office at 8 AM    [facility] EMERGENCY DEPT  [address] [facility]  [phone]    If symptoms worsen      DISCHARGEMEDICATIONS:  Discharge Medication List as of 4/17/2022  4:16 PM      START taking these medications    Details   sulfamethoxazole-trimethoprim (BACTRIM DS) 800-160 MG per tablet Take 1 tablet by mouth 2 times daily for 10 days, Disp-20 tablet, R-0Normal      oxyCODONE-acetaminophen (PERCOCET) 5-325 MG per tablet Take 1 tablet by mouth every 6 hours as needed for Pain for up to 3 days. Intended supply: 3 days.  Take lowest dose possible to manage pain, Disp-12 tablet, R-0Normal      lidocaine (LMX) 4 % cream Apply topically as needed for pain., Disp-30 g, R-0, Normal             (Please note that portions of this note were completedwith a voice recognition program.  Efforts were made to edit the dictations but occasionally words are mis-transcribed.)       Bautista Bazzi PA-C  04/21/22 [id number]

## 2022-06-21 ENCOUNTER — APPOINTMENT (OUTPATIENT)
Dept: GENERAL RADIOLOGY | Age: 62
End: 2022-06-21
Payer: COMMERCIAL

## 2022-06-21 ENCOUNTER — HOSPITAL ENCOUNTER (EMERGENCY)
Age: 62
Discharge: HOME | End: 2022-06-21
Payer: COMMERCIAL

## 2022-06-21 VITALS
DIASTOLIC BLOOD PRESSURE: 66 MMHG | HEIGHT: 61 IN | BODY MASS INDEX: 21.71 KG/M2 | OXYGEN SATURATION: 95 % | RESPIRATION RATE: 21 BRPM | SYSTOLIC BLOOD PRESSURE: 104 MMHG | WEIGHT: 115 LBS | HEART RATE: 85 BPM

## 2022-06-21 DIAGNOSIS — N30.00 ACUTE CYSTITIS WITHOUT HEMATURIA: ICD-10-CM

## 2022-06-21 DIAGNOSIS — R07.9 CHEST PAIN, UNSPECIFIED TYPE: Primary | ICD-10-CM

## 2022-06-21 DIAGNOSIS — S90.31XA CONTUSION OF RIGHT FOOT, INITIAL ENCOUNTER: ICD-10-CM

## 2022-06-21 LAB
ALBUMIN SERPL-MCNC: 3.9 G/DL (ref 3.5–5.1)
ALP BLD-CCNC: 161 U/L (ref 38–126)
ALT SERPL-CCNC: 18 U/L (ref 11–66)
AMPHETAMINE+METHAMPHETAMINE URINE SCREEN: NEGATIVE
ANION GAP SERPL CALCULATED.3IONS-SCNC: 14 MEQ/L (ref 8–16)
AST SERPL-CCNC: 49 U/L (ref 5–40)
BACTERIA: ABNORMAL
BARBITURATE QUANTITATIVE URINE: NEGATIVE
BASOPHILS # BLD: 0.4 %
BASOPHILS ABSOLUTE: 0 THOU/MM3 (ref 0–0.1)
BENZODIAZEPINE QUANTITATIVE URINE: NEGATIVE
BILIRUB SERPL-MCNC: 1.1 MG/DL (ref 0.3–1.2)
BILIRUBIN DIRECT: 0.3 MG/DL (ref 0–0.3)
BILIRUBIN URINE: NEGATIVE
BLOOD, URINE: NEGATIVE
BUN BLDV-MCNC: 5 MG/DL (ref 7–22)
CALCIUM SERPL-MCNC: 8.7 MG/DL (ref 8.5–10.5)
CANNABINOID QUANTITATIVE URINE: NEGATIVE
CASTS: ABNORMAL /LPF
CASTS: ABNORMAL /LPF
CHARACTER, URINE: CLEAR
CHLORIDE BLD-SCNC: 100 MEQ/L (ref 98–111)
CO2: 24 MEQ/L (ref 23–33)
COCAINE METABOLITE QUANTITATIVE URINE: POSITIVE
COLOR: YELLOW
CREAT SERPL-MCNC: 0.7 MG/DL (ref 0.4–1.2)
CRYSTALS: ABNORMAL
EKG ATRIAL RATE: 95 BPM
EKG P AXIS: 50 DEGREES
EKG P-R INTERVAL: 132 MS
EKG Q-T INTERVAL: 352 MS
EKG QRS DURATION: 66 MS
EKG QTC CALCULATION (BAZETT): 442 MS
EKG R AXIS: 56 DEGREES
EKG T AXIS: 9 DEGREES
EKG VENTRICULAR RATE: 95 BPM
EOSINOPHIL # BLD: 1.2 %
EOSINOPHILS ABSOLUTE: 0.1 THOU/MM3 (ref 0–0.4)
EPITHELIAL CELLS, UA: ABNORMAL /HPF
ERYTHROCYTE [DISTWIDTH] IN BLOOD BY AUTOMATED COUNT: 13.5 % (ref 11.5–14.5)
ERYTHROCYTE [DISTWIDTH] IN BLOOD BY AUTOMATED COUNT: 47.6 FL (ref 35–45)
GFR SERPL CREATININE-BSD FRML MDRD: > 90 ML/MIN/1.73M2
GLUCOSE BLD-MCNC: 105 MG/DL (ref 70–108)
GLUCOSE, URINE: NEGATIVE MG/DL
HCT VFR BLD CALC: 36 % (ref 37–47)
HEMOGLOBIN: 12.5 GM/DL (ref 12–16)
IMMATURE GRANS (ABS): 0.03 THOU/MM3 (ref 0–0.07)
IMMATURE GRANULOCYTES: 0.3 %
KETONES, URINE: NEGATIVE
LEUKOCYTE ESTERASE, URINE: ABNORMAL
LIPASE: 20.1 U/L (ref 5.6–51.3)
LYMPHOCYTES # BLD: 29.8 %
LYMPHOCYTES ABSOLUTE: 3.4 THOU/MM3 (ref 1–4.8)
MCH RBC QN AUTO: 33.7 PG (ref 26–33)
MCHC RBC AUTO-ENTMCNC: 34.7 GM/DL (ref 32.2–35.5)
MCV RBC AUTO: 97 FL (ref 81–99)
MISCELLANEOUS LAB TEST RESULT: ABNORMAL
MONOCYTES # BLD: 12.2 %
MONOCYTES ABSOLUTE: 1.4 THOU/MM3 (ref 0.4–1.3)
NITRITE, URINE: NEGATIVE
NUCLEATED RED BLOOD CELLS: 0 /100 WBC
OPIATES, URINE: NEGATIVE
OSMOLALITY CALCULATION: 273.3 MOSMOL/KG (ref 275–300)
OXYCODONE: NEGATIVE
PH UA: 5 (ref 5–9)
PHENCYCLIDINE QUANTITATIVE URINE: NEGATIVE
PLATELET # BLD: 260 THOU/MM3 (ref 130–400)
PMV BLD AUTO: 9.7 FL (ref 9.4–12.4)
POTASSIUM REFLEX MAGNESIUM: 4.4 MEQ/L (ref 3.5–5.2)
PRO-BNP: 58.3 PG/ML (ref 0–900)
PROTEIN UA: NEGATIVE MG/DL
RBC # BLD: 3.71 MILL/MM3 (ref 4.2–5.4)
RBC URINE: ABNORMAL /HPF
RENAL EPITHELIAL, UA: ABNORMAL
SEG NEUTROPHILS: 56.1 %
SEGMENTED NEUTROPHILS ABSOLUTE COUNT: 6.3 THOU/MM3 (ref 1.8–7.7)
SODIUM BLD-SCNC: 138 MEQ/L (ref 135–145)
SPECIFIC GRAVITY UA: 1.01 (ref 1–1.03)
TOTAL PROTEIN: 7 G/DL (ref 6.1–8)
TROPONIN T: < 0.01 NG/ML
UROBILINOGEN, URINE: 1 EU/DL (ref 0–1)
WBC # BLD: 11.3 THOU/MM3 (ref 4.8–10.8)
WBC UA: ABNORMAL /HPF
YEAST: ABNORMAL

## 2022-06-21 PROCEDURE — 99285 EMERGENCY DEPT VISIT HI MDM: CPT

## 2022-06-21 PROCEDURE — 83690 ASSAY OF LIPASE: CPT

## 2022-06-21 PROCEDURE — 93005 ELECTROCARDIOGRAM TRACING: CPT | Performed by: NURSE PRACTITIONER

## 2022-06-21 PROCEDURE — 73630 X-RAY EXAM OF FOOT: CPT

## 2022-06-21 PROCEDURE — 96374 THER/PROPH/DIAG INJ IV PUSH: CPT

## 2022-06-21 PROCEDURE — 93010 ELECTROCARDIOGRAM REPORT: CPT | Performed by: NUCLEAR MEDICINE

## 2022-06-21 PROCEDURE — 71045 X-RAY EXAM CHEST 1 VIEW: CPT

## 2022-06-21 PROCEDURE — 80076 HEPATIC FUNCTION PANEL: CPT

## 2022-06-21 PROCEDURE — 81001 URINALYSIS AUTO W/SCOPE: CPT

## 2022-06-21 PROCEDURE — 6370000000 HC RX 637 (ALT 250 FOR IP): Performed by: NURSE PRACTITIONER

## 2022-06-21 PROCEDURE — 85025 COMPLETE CBC W/AUTO DIFF WBC: CPT

## 2022-06-21 PROCEDURE — 6360000002 HC RX W HCPCS: Performed by: NURSE PRACTITIONER

## 2022-06-21 PROCEDURE — 80307 DRUG TEST PRSMV CHEM ANLYZR: CPT

## 2022-06-21 PROCEDURE — 84484 ASSAY OF TROPONIN QUANT: CPT

## 2022-06-21 PROCEDURE — 83880 ASSAY OF NATRIURETIC PEPTIDE: CPT

## 2022-06-21 PROCEDURE — 80048 BASIC METABOLIC PNL TOTAL CA: CPT

## 2022-06-21 RX ORDER — PANTOPRAZOLE SODIUM 40 MG/1
40 TABLET, DELAYED RELEASE ORAL ONCE
Status: COMPLETED | OUTPATIENT
Start: 2022-06-21 | End: 2022-06-21

## 2022-06-21 RX ORDER — KETOROLAC TROMETHAMINE 30 MG/ML
15 INJECTION, SOLUTION INTRAMUSCULAR; INTRAVENOUS ONCE
Status: COMPLETED | OUTPATIENT
Start: 2022-06-21 | End: 2022-06-21

## 2022-06-21 RX ORDER — CEPHALEXIN 500 MG/1
500 CAPSULE ORAL 2 TIMES DAILY
Qty: 14 CAPSULE | Refills: 0 | Status: SHIPPED | OUTPATIENT
Start: 2022-06-21 | End: 2022-06-28

## 2022-06-21 RX ADMIN — LIDOCAINE HYDROCHLORIDE: 20 SOLUTION ORAL; TOPICAL at 05:13

## 2022-06-21 RX ADMIN — PANTOPRAZOLE SODIUM 40 MG: 40 TABLET, DELAYED RELEASE ORAL at 05:13

## 2022-06-21 RX ADMIN — KETOROLAC TROMETHAMINE 15 MG: 30 INJECTION, SOLUTION INTRAMUSCULAR; INTRAVENOUS at 05:15

## 2022-06-21 ASSESSMENT — ENCOUNTER SYMPTOMS
VOMITING: 0
COUGH: 0
CONSTIPATION: 0
RHINORRHEA: 0
EYE PAIN: 0
ABDOMINAL PAIN: 0
BLOOD IN STOOL: 0
WHEEZING: 0
DIARRHEA: 0
NAUSEA: 0
SHORTNESS OF BREATH: 0

## 2022-06-21 ASSESSMENT — HEART SCORE: ECG: 0

## 2022-06-21 ASSESSMENT — PAIN SCALES - GENERAL
PAINLEVEL_OUTOF10: 10

## 2022-06-21 ASSESSMENT — PAIN - FUNCTIONAL ASSESSMENT
PAIN_FUNCTIONAL_ASSESSMENT: 0-10
PAIN_FUNCTIONAL_ASSESSMENT: 0-10

## 2022-06-21 ASSESSMENT — PAIN DESCRIPTION - LOCATION: LOCATION: CHEST

## 2022-06-21 NOTE — ED TRIAGE NOTES
Patient presents to the ED with complaints of chest pain that woke her up from sleep this morning. Patient reports 10/10 pain and states it feels like someone punched her in the chest and feels like needles are poking her. Patient reports taking a medication for pain before coming into the ED but does not know the medication. EKG completed at bedside. Meche Greene CNP at bedside. Patient reports history of hypertension but no cardiac surgeries.

## 2022-06-21 NOTE — ED PROVIDER NOTES
[address] [facility] [address] [address] EMERGENCY DEPT  EMERGENCY MEDICINE     Pt Name: Mariano Lloyd  MRN: [id number]  [place] 1960  Date of evaluation: 2022  PCP:    MELLY Calero CNP  Provider: MELLY Perrin CNP    CHIEF COMPLAINT       Chief Complaint   Patient presents with    Chest Pain           HISTORY OF PRESENT ILLNESS    Mariano Lloyd is a 57 y.o. female patient that presents to ER with pain of sharp stabbing chest pain that she rates a 10 out of 10 in her central chest.  Patient denies shortness of breath, nausea, vomiting, diarrhea or fever. Patient does state that she had a slip and fall a couple days ago where she slipped on the last step and hurt her foot on the right side. Patient does have bruising noted to the plantar aspect of the foot and the medial aspect of the side of the foot. Denies hitting her head or other injuries from the fall. Triage notes and Nursing notes were reviewed by myself. Any discrepancies are addressed above.     PAST MEDICAL HISTORY     Past Medical History:   Diagnosis Date    Anxiety     Asthma     Bipolar disorder ([address]     Blood clotting disorder ([address] 2014    was on coumadin until 2014    Breast cyst 7-8 yrs ago    rt    Depression     Duodenal ulcer 2019    GERD (gastroesophageal reflux disease)     Hyperlipidemia     Hypertension     Schizophrenia ([address]        SURGICAL HISTORY       Past Surgical History:   Procedure Laterality Date    BREAST SURGERY  10 yrs    lump right     SECTION      x 1    ELBOW SURGERY Left 2013    Deep hardware removal with ulnar nerve decompression    FASCIOTOMY Left 2013    L arm    OTHER SURGICAL HISTORY  3/19/2013    LEFT ELBOW ORIF WITH WOUND CLOSURE    TUBAL LIGATION      UPPER GASTROINTESTINAL ENDOSCOPY  2019       CURRENT MEDICATIONS       Discharge Medication List as of 2022  6:02 AM      CONTINUE these medications which have NOT CHANGED    Details lidocaine (LMX) 4 % cream Apply topically as needed for pain., Disp-30 g, R-0, Normal      sertraline (ZOLOFT) 50 MG tablet TAKE 1 TABLET BY MOUTH EVERY DAY, Disp-90 tablet, R-3Normal      famotidine (PEPCID) 20 MG tablet Take 1 tablet by mouth 2 times daily for 14 days, Disp-28 tablet, R-0Normal      lamoTRIgine (LAMICTAL) 25 MG tablet TAKE 2 TABLETS BY MOUTH TWICE A DAY, Disp-360 tablet, R-1Normal      lisinopril-hydroCHLOROthiazide (PRINZIDE;ZESTORETIC) 10-12.5 MG per tablet TAKE 1 TABLET BY MOUTH EVERY DAY, Disp-90 tablet, R-3Normal      ondansetron (ZOFRAN) 4 MG tablet Take 1 tablet by mouth 3 times daily as needed for Nausea or Vomiting, Disp-15 tablet, [id number]      folic acid (FOLVITE) 1 MG tablet Take 1 tablet by mouth daily, Disp-90 tablet, R-4Normal      pantoprazole (PROTONIX) 40 MG tablet TAKE 1 TABLET BY MOUTH EVERY DAY, Disp-90 tablet, R-3Normal      megestrol (MEGACE) 20 MG tablet TAKE 1 TABLET BY MOUTH EVERY DAY, Disp-90 tablet, R-3Normal      loratadine (CLARITIN) 10 MG tablet TAKE 1 TABLET BY MOUTH EVERY DAY, Disp-30 tablet, R-11Normal      !! thiamine mononitrate 100 MG tablet TAKE 1 TABLET BY MOUTH EVERY DAY, Disp-90 tablet, R-3Normal      simvastatin (ZOCOR) 40 MG tablet TAKE 1 TABLET BY MOUTH EVERY DAY AT NIGHT, Disp-90 tablet, R-3Normal      !! thiamine mononitrate 100 MG tablet TAKE 1 TABLET BY MOUTH EVERY DAYHistorical Med      fluticasone (FLONASE) 50 MCG/ACT nasal spray 2 sprays by Nasal route daily, Disp-1 Bottle,R-5Normal      ASPIRIN LOW DOSE 81 MG EC tablet TAKE 1 TABLET BY MOUTH DAILY, Disp-90 tablet, R-3Normal      albuterol sulfate HFA (PROVENTIL HFA) 108 (90 Base) MCG/ACT inhaler Inhale 2 puffs into the lungs every 4 hours as needed for Wheezing or Shortness of Breath (Space out to every 6 hours as symptoms improve) Space out to every 6 hours as symptoms improve., Disp-1 Inhaler, R-0Print       !! - Potential duplicate medications found. Please discuss with provider. ALLERGIES       Allergies   Allergen Reactions    Other      Other reaction(s): AOF    Pork-Derived Products Nausea Only       FAMILY HISTORY       Family History   Problem Relation Age of Onset    Cancer Mother     Diabetes Father     Cancer Maternal Uncle         bone    Cancer Maternal Grandmother         uterine    Diabetes Maternal Grandfather     Cancer Brother         Colon Cancer        SOCIAL HISTORY       Social History     Socioeconomic History    Marital status: Single     Spouse name: None    Number of children: 1    Years of education: 6    Highest education level: None   Occupational History    Occupation: SSI   Tobacco Use    Smoking status: Current Some Day Smoker     Packs/day: 0.00     Years: 35.00     Pack years: 0.00     Types: Cigarettes    Smokeless tobacco: Never Used   Substance and Sexual Activity    Alcohol use: Yes     Alcohol/week: 5.0 standard drinks     Types: 5 Glasses of wine per week     Comment: two bottles of wine per week    Drug use: No    Sexual activity: Yes     Partners: Male   Other Topics Concern    None   Social History Narrative    None     Social Determinants of Health     Financial Resource Strain:     Difficulty of Paying Living Expenses: Not on file   Food Insecurity:     Worried About Running Out of Food in the Last Year: Not on file    Kuldeep of Food in the Last Year: Not on file   Transportation Needs:     Lack of Transportation (Medical): Not on file    Lack of Transportation (Non-Medical):  Not on file   Physical Activity:     Days of Exercise per Week: Not on file    Minutes of Exercise per Session: Not on file   Stress:     Feeling of Stress : Not on file   Social Connections:     Frequency of Communication with Friends and Family: Not on file    Frequency of Social Gatherings with Friends and Family: Not on file    Attends Gnosticism Services: Not on file    Active Member of Clubs or Organizations: Not on file    Attends Club or Organization Meetings: Not on file    Marital Status: Not on file   Intimate Partner Violence:     Fear of Current or Ex-Partner: Not on file    Emotionally Abused: Not on file    Physically Abused: Not on file    Sexually Abused: Not on file   Housing Stability:     Unable to Pay for Housing in the Last Year: Not on file    Number of Veritomojanette in the Last Year: Not on file    Unstable Housing in the Last Year: Not on file       REVIEW OF SYSTEMS     Review of Systems   Constitutional: Negative for appetite change, chills, fatigue, fever and unexpected weight change. HENT: Negative for ear pain and rhinorrhea. Eyes: Negative for pain and visual disturbance. Respiratory: Negative for cough, shortness of breath and wheezing. Cardiovascular: Positive for chest pain. Negative for palpitations and leg swelling. Gastrointestinal: Negative for abdominal pain, blood in stool, constipation, diarrhea, nausea and vomiting. Genitourinary: Negative for dysuria, frequency and hematuria. Musculoskeletal: Positive for arthralgias (right foot pain). Negative for joint swelling and neck stiffness. Skin: Negative for rash. Neurological: Negative for dizziness, syncope, weakness, light-headedness and headaches. Hematological: Does not bruise/bleed easily. Except as noted above the remainder of the review of systems was reviewed and is negative.   SCREENINGS        [place] Coma Scale  Eye Opening: Spontaneous  Best Verbal Response: Oriented  Best Motor Response: Obeys commands  [place] Coma Scale Score: 15 Heart Score for chest pain patients  History: Slightly Suspicious  ECG: Normal  Patient Age: > 39 and < 65 years  *Risk factors for Atherosclerotic disease: Hypertension  Risk Factors: 1 or 2 risk factors  Troponin: < 1X normal limit  Heart Score Total: 2             PHYSICAL EXAM    (up to 7 for level 4, 8 or more for level 5)     ED Triage Vitals [02/19/22 1512]   BP Temp Temp Source Pulse Resp SpO2 Height Weight   (!) 134/95 98.2 °F (36.8 °C) Oral 124 16 100 % -- --       Physical Exam  Vitals and nursing note reviewed. Constitutional:       Appearance: She is well-developed. HENT:      Head: Normocephalic and atraumatic. Eyes:      Conjunctiva/sclera: Conjunctivae normal.      Pupils: Pupils are equal, round, and reactive to light. Cardiovascular:      Rate and Rhythm: Normal rate and regular rhythm. Pulses:           Dorsalis pedis pulses are 2+ on the right side and 2+ on the left side. Posterior tibial pulses are 2+ on the right side and 2+ on the left side. Heart sounds: Normal heart sounds. No murmur heard. No gallop. Pulmonary:      Effort: Pulmonary effort is normal. No respiratory distress. Breath sounds: Normal breath sounds. No wheezing or rales. Chest:      Chest wall: Tenderness present. Abdominal:      General: Bowel sounds are normal.      Palpations: Abdomen is soft. Musculoskeletal:         General: Normal range of motion. Cervical back: Normal range of motion. Feet:    Skin:     General: Skin is warm and dry. Neurological:      Mental Status: She is alert and oriented to person, place, and time. DIAGNOSTIC RESULTS     EKG:(none if blank)  All EKGs are interpreted by the Emergency Department Physician who either signs or Co-signs this chart in the absence of a cardiologist.    Normal sinus rhythm with a ventricular rate of 95. FL interval 132 ms, QRS 66 ms,  ms and  ms. RADIOLOGY: (none if blank)   I directly visualized the following images and reviewed the radiologist interpretations. Interpretation per the Radiologist below, if available at the time of this note:  XR FOOT RIGHT (MIN 3 VIEWS)   Final Result   No acute fracture or dislocation. This document has been electronically signed by:  Manny Goss MD on    06/21/2022 04:22 AM      XR CHEST PORTABLE   Final Result   Impression: No acute pathology. This document has been electronically signed by: Winnie Mancia MD on    06/21/2022 04:19 AM          LABS:  Labs Reviewed   CBC WITH AUTO DIFFERENTIAL - Abnormal; Notable for the following components:       Result Value    WBC 11.3 (*)     RBC 3.71 (*)     Hematocrit 36.0 (*)     MCH 33.7 (*)     RDW-SD 47.6 (*)     Monocytes Absolute 1.4 (*)     All other components within normal limits   BASIC METABOLIC PANEL W/ REFLEX TO MG FOR LOW K - Abnormal; Notable for the following components:    BUN 5 (*)     All other components within normal limits   HEPATIC FUNCTION PANEL - Abnormal; Notable for the following components:    Alkaline Phosphatase 161 (*)     AST 49 (*)     All other components within normal limits   URINALYSIS WITH MICROSCOPIC - Abnormal; Notable for the following components:    Leukocyte Esterase, Urine MODERATE (*)     All other components within normal limits   OSMOLALITY - Abnormal; Notable for the following components:    Osmolality Calc 273.3 (*)     All other components within normal limits   TROPONIN   LIPASE   BRAIN NATRIURETIC PEPTIDE   URINE DRUG SCREEN   ANION GAP   GLOMERULAR FILTRATION RATE, ESTIMATED       All other labs were within normal range or not returned as of this dictation. Please note, any cultures that may have been sent were not resulted at the time of this patient visit. EMERGENCY DEPARTMENT COURSE and Medical Decision Making:     Vitals:    Vitals:    06/21/22 0324 06/21/22 0405   BP: 124/75 104/66   Pulse:  85   Resp:  21   SpO2:  95%   Weight: 115 lb (52.2 kg)    Height: 5' 1\" (1.549 m)        PROCEDURES: (None if blank)  Procedures    ED Course as of 06/26/22 2342 Tue Jun 21, 2022   [address] Reviewed case with Dr. Rhina Porter. Rohit for discharge with follow-up with chest pain clinic tomorrow.  [NW]      ED Course User Index  [NW] MELLY Cardenas - CNP     MDM  Number of Diagnoses or Management Options     Amount and/or Complexity of Data Reviewed  Clinical lab tests: ordered and reviewed  Tests in the radiology section of CPT®: ordered and reviewed  Tests in the medicine section of CPT®: ordered and reviewed  Review and summarize past medical records: yes  Discuss the patient with other providers: yes  Independent visualization of images, tracings, or specimens: yes      Patient that presents to ER with pain of sharp stabbing chest pain that she rates a 10 out of 10 in her central chest. Patient does state that she had a slip and fall a couple days ago where she slipped on the last step and hurt her foot on the right side. Differential diagnosis includes but not limited to arrhythmia, NSTEMI, right foot fracture, pneumonia or aortic dissection. Labs EKG and chest x-ray are all reassuring. Patient does have a UTI that will be treated with oral Keflex. Patient will be sent to the chest pain clinic tomorrow morning at 930. Discussed this with patient and she is agreeable with this plan. Of note, patient did test positive for cocaine and states that she has been under some stress lately due to a brother that is getting ready to . Return to ER for worsening symptoms, chest pain, shortness of breath, puslike drainage from wound, inability to keep liquids down, inability to urinate for greater than 8 hours or difficulty breathing. Follow-up with cardiology listed in the discharge summary. An appointment was scheduled for you at 930 on 2022.       Strict return precautions and follow up instructions were discussed with the patient with which the patient agrees    ED Medications administered this visit:    Medications   ketorolac (TORADOL) injection 15 mg (15 mg IntraVENous Given 2215)   aluminum & magnesium hydroxide-simethicone (MAALOX) 30 mL, lidocaine viscous hcl (XYLOCAINE) 5 mL (GI COCKTAIL) ( Oral Given 22)   pantoprazole (PROTONIX) tablet 40 mg (40 mg Oral Given 22)         FINAL IMPRESSION      1. Chest pain, unspecified type    2. Acute cystitis without hematuria    3.  Contusion of right foot, initial encounter          DISPOSITION/PLAN   DISPOSITION        PATIENT REFERRED TO:  [address] [address]  [phone]  Go in 1 day  at 0930 this morning      DISCHARGE MEDICATIONS:  Discharge Medication List as of 6/21/2022  6:02 AM      START taking these medications    Details   cephALEXin (KEFLEX) 500 MG capsule Take 1 capsule by mouth 2 times daily for 7 days, Disp-14 capsule, R-0Normal                    MELLY Man - CNP (electronically signed)           MELLY Man CNP  06/21/22 [facility] MELLY Palomares - CNP  06/26/22 8295

## 2022-07-19 DIAGNOSIS — E78.5 HYPERLIPIDEMIA, UNSPECIFIED HYPERLIPIDEMIA TYPE: ICD-10-CM

## 2022-07-19 RX ORDER — SIMVASTATIN 40 MG
TABLET ORAL
Qty: 90 TABLET | Refills: 3 | Status: SHIPPED | OUTPATIENT
Start: 2022-07-19

## 2022-07-25 ENCOUNTER — OFFICE VISIT (OUTPATIENT)
Dept: FAMILY MEDICINE CLINIC | Age: 62
End: 2022-07-25
Payer: COMMERCIAL

## 2022-07-25 VITALS
HEIGHT: 61 IN | RESPIRATION RATE: 20 BRPM | HEART RATE: 86 BPM | SYSTOLIC BLOOD PRESSURE: 128 MMHG | OXYGEN SATURATION: 96 % | DIASTOLIC BLOOD PRESSURE: 74 MMHG | WEIGHT: 108.4 LBS | BODY MASS INDEX: 20.47 KG/M2 | TEMPERATURE: 98.5 F

## 2022-07-25 DIAGNOSIS — M77.8 TENDONITIS OF ELBOW, RIGHT: Primary | ICD-10-CM

## 2022-07-25 DIAGNOSIS — G56.01 CARPAL TUNNEL SYNDROME OF RIGHT WRIST: ICD-10-CM

## 2022-07-25 DIAGNOSIS — I10 PRIMARY HYPERTENSION: ICD-10-CM

## 2022-07-25 DIAGNOSIS — F31.77 BIPOLAR DISORDER, IN PARTIAL REMISSION, MOST RECENT EPISODE MIXED (HCC): ICD-10-CM

## 2022-07-25 DIAGNOSIS — F31.62 BIPOLAR DISORDER, CURRENT EPISODE MIXED, MODERATE (HCC): ICD-10-CM

## 2022-07-25 DIAGNOSIS — F34.1 DYSTHYMIA: ICD-10-CM

## 2022-07-25 DIAGNOSIS — K21.9 GASTROESOPHAGEAL REFLUX DISEASE, UNSPECIFIED WHETHER ESOPHAGITIS PRESENT: ICD-10-CM

## 2022-07-25 DIAGNOSIS — D53.9 MACROCYTIC ANEMIA: ICD-10-CM

## 2022-07-25 DIAGNOSIS — F22 PARANOIA (HCC): ICD-10-CM

## 2022-07-25 PROCEDURE — 99214 OFFICE O/P EST MOD 30 MIN: CPT | Performed by: NURSE PRACTITIONER

## 2022-07-25 PROCEDURE — 3017F COLORECTAL CA SCREEN DOC REV: CPT | Performed by: NURSE PRACTITIONER

## 2022-07-25 PROCEDURE — G8427 DOCREV CUR MEDS BY ELIG CLIN: HCPCS | Performed by: NURSE PRACTITIONER

## 2022-07-25 PROCEDURE — G8420 CALC BMI NORM PARAMETERS: HCPCS | Performed by: NURSE PRACTITIONER

## 2022-07-25 PROCEDURE — 4004F PT TOBACCO SCREEN RCVD TLK: CPT | Performed by: NURSE PRACTITIONER

## 2022-07-25 RX ORDER — FOLIC ACID 1 MG/1
1 TABLET ORAL DAILY
Qty: 90 TABLET | Refills: 4 | Status: SHIPPED | OUTPATIENT
Start: 2022-07-25

## 2022-07-25 RX ORDER — LISINOPRIL AND HYDROCHLOROTHIAZIDE 12.5; 1 MG/1; MG/1
TABLET ORAL
Qty: 90 TABLET | Refills: 4 | Status: SHIPPED | OUTPATIENT
Start: 2022-07-25

## 2022-07-25 RX ORDER — PREDNISONE 20 MG/1
TABLET ORAL
Qty: 18 TABLET | Refills: 0 | Status: SHIPPED | OUTPATIENT
Start: 2022-07-25

## 2022-07-25 RX ORDER — LAMOTRIGINE 25 MG/1
TABLET ORAL
Qty: 360 TABLET | Refills: 4 | Status: SHIPPED | OUTPATIENT
Start: 2022-07-25

## 2022-07-25 RX ORDER — PANTOPRAZOLE SODIUM 40 MG/1
TABLET, DELAYED RELEASE ORAL
Qty: 90 TABLET | Refills: 4 | Status: SHIPPED | OUTPATIENT
Start: 2022-07-25

## 2022-07-25 RX ORDER — FAMOTIDINE 20 MG/1
20 TABLET, FILM COATED ORAL 2 TIMES DAILY
Qty: 180 TABLET | Refills: 4 | Status: SHIPPED | OUTPATIENT
Start: 2022-07-25 | End: 2022-08-24

## 2022-07-25 RX ORDER — GAUZE BANDAGE 2" X 2"
BANDAGE TOPICAL
Qty: 90 TABLET | Refills: 4 | Status: SHIPPED | OUTPATIENT
Start: 2022-07-25

## 2022-07-25 SDOH — ECONOMIC STABILITY: FOOD INSECURITY: WITHIN THE PAST 12 MONTHS, YOU WORRIED THAT YOUR FOOD WOULD RUN OUT BEFORE YOU GOT MONEY TO BUY MORE.: NEVER TRUE

## 2022-07-25 SDOH — ECONOMIC STABILITY: FOOD INSECURITY: WITHIN THE PAST 12 MONTHS, THE FOOD YOU BOUGHT JUST DIDN'T LAST AND YOU DIDN'T HAVE MONEY TO GET MORE.: NEVER TRUE

## 2022-07-25 ASSESSMENT — PATIENT HEALTH QUESTIONNAIRE - PHQ9
5. POOR APPETITE OR OVEREATING: 1
9. THOUGHTS THAT YOU WOULD BE BETTER OFF DEAD, OR OF HURTING YOURSELF: 0
1. LITTLE INTEREST OR PLEASURE IN DOING THINGS: 0
2. FEELING DOWN, DEPRESSED OR HOPELESS: 3
SUM OF ALL RESPONSES TO PHQ QUESTIONS 1-9: 1
1. LITTLE INTEREST OR PLEASURE IN DOING THINGS: 0
3. TROUBLE FALLING OR STAYING ASLEEP: 3
SUM OF ALL RESPONSES TO PHQ QUESTIONS 1-9: 1
10. IF YOU CHECKED OFF ANY PROBLEMS, HOW DIFFICULT HAVE THESE PROBLEMS MADE IT FOR YOU TO DO YOUR WORK, TAKE CARE OF THINGS AT HOME, OR GET ALONG WITH OTHER PEOPLE: 1
7. TROUBLE CONCENTRATING ON THINGS, SUCH AS READING THE NEWSPAPER OR WATCHING TELEVISION: 1
4. FEELING TIRED OR HAVING LITTLE ENERGY: 3
SUM OF ALL RESPONSES TO PHQ QUESTIONS 1-9: 11
8. MOVING OR SPEAKING SO SLOWLY THAT OTHER PEOPLE COULD HAVE NOTICED. OR THE OPPOSITE, BEING SO FIGETY OR RESTLESS THAT YOU HAVE BEEN MOVING AROUND A LOT MORE THAN USUAL: 0
SUM OF ALL RESPONSES TO PHQ9 QUESTIONS 1 & 2: 1
SUM OF ALL RESPONSES TO PHQ QUESTIONS 1-9: 11
SUM OF ALL RESPONSES TO PHQ9 QUESTIONS 1 & 2: 3
SUM OF ALL RESPONSES TO PHQ QUESTIONS 1-9: 11
SUM OF ALL RESPONSES TO PHQ QUESTIONS 1-9: 11
2. FEELING DOWN, DEPRESSED OR HOPELESS: 1
SUM OF ALL RESPONSES TO PHQ QUESTIONS 1-9: 1
SUM OF ALL RESPONSES TO PHQ QUESTIONS 1-9: 1
6. FEELING BAD ABOUT YOURSELF - OR THAT YOU ARE A FAILURE OR HAVE LET YOURSELF OR YOUR FAMILY DOWN: 0

## 2022-07-25 ASSESSMENT — ENCOUNTER SYMPTOMS
BACK PAIN: 0
GASTROINTESTINAL NEGATIVE: 1
RESPIRATORY NEGATIVE: 1

## 2022-07-25 ASSESSMENT — SOCIAL DETERMINANTS OF HEALTH (SDOH): HOW HARD IS IT FOR YOU TO PAY FOR THE VERY BASICS LIKE FOOD, HOUSING, MEDICAL CARE, AND HEATING?: NOT HARD AT ALL

## 2022-07-25 NOTE — PROGRESS NOTES
[facility] MEDICINE  [address] [place] [address]  Dept: [phone]  Dept Fax: [phone]  Loc: Natalie Peterson is a 57 y.o. femalewho presents today for her medical conditions/complaints as noted below. Santiago Loyola c/o of Ankle Pain and Follow-up (Right arm sharp numb pain a couple days, fell down steps a couple weeks ago )      HPI:      Pt fell a few weeks ago and fractured her right foot. Seeing ortho for this. Since that time she has also been having right wrist pain that radiates up to her right elbow. She did have this x-rayed twice since the fall and does not have any fractures of her right wrist or lower arm. Denies any swelling or redness of her right lower arm or wrist. States it hurts everyday and she will feel shock waves go from her right wrist up to her right elbow. It does wake her up  at night hand will tingle. She does work out in her garden a lot and uses heavy tools with her right hand. Has bene ot of her anxiety and bipolar medications mood swings have been flaring up. GERD    HPI:     Symptoms:  heartburn  Length of symptoms: 1 years  Current therapy and response:  protonix 40 mg daily   Recent change in symptoms? Yes - worsened b/c she ran out of meds   Symptoms associated with certain foods? Yes  Alcohol use? Yes  Tobacco use? Yes    Abdominal Pain? No  Mid Back Pain? No  Melena? No     HTN    Does patient check BP regularly at home? - No  Current Medication regimen - in chart zestoretic   Tolerating medications well? - yes    Shortness of breath or chest pain? No  Headache or visual complaints? No  Neurologic changes like confusion? No  Extremity edema? No    BP Readings from Last 3 Encounters:  07/25/22 : 128/74  06/21/22 : 104/66  04/17/22 : 136/80    History of macrocytic anemia.   On B1 and folic acid     Lab Results       Component                Value               Date WBC                      11.3 (H)            06/21/2022                 HGB                      12.5                06/21/2022                 HCT                      36.0 (L)            06/21/2022                 MCV                      97.0                06/21/2022                 PLT                      260                 06/21/2022                       Current Outpatient Medications   Medication Sig Dispense Refill    sertraline (ZOLOFT) 50 MG tablet 1 tab po daily 90 tablet 4    pantoprazole (PROTONIX) 40 MG tablet TAKE 1 TABLET BY MOUTH EVERY DAY 90 tablet 4    lisinopril-hydroCHLOROthiazide (PRINZIDE;ZESTORETIC) 10-12.5 MG per tablet 1 tab po daily 90 tablet 4    lamoTRIgine (LAMICTAL) 25 MG tablet TAKE 2 TABLETS BY MOUTH TWICE A  tablet 4    folic acid (FOLVITE) 1 MG tablet Take 1 tablet by mouth in the morning. 90 tablet 4    famotidine (PEPCID) 20 MG tablet Take 1 tablet by mouth in the morning and 1 tablet before bedtime. 180 tablet 4    thiamine mononitrate 100 MG tablet TAKE 1 TABLET BY MOUTH EVERY DAY 90 tablet 4    predniSONE (DELTASONE) 20 MG tablet 1 tab po tid for 3 days 1 tab po bid for 3 days 1 tab po once day for 3 days 18 tablet 0    simvastatin (ZOCOR) 40 MG tablet TAKE 1 TABLET BY MOUTH EVERY DAY AT NIGHT 90 tablet 3    lidocaine (LMX) 4 % cream Apply topically as needed for pain.  30 g 0    ondansetron (ZOFRAN) 4 MG tablet Take 1 tablet by mouth 3 times daily as needed for Nausea or Vomiting 15 tablet 0    megestrol (MEGACE) 20 MG tablet TAKE 1 TABLET BY MOUTH EVERY DAY 90 tablet 3    loratadine (CLARITIN) 10 MG tablet TAKE 1 TABLET BY MOUTH EVERY DAY 30 tablet 11    thiamine mononitrate 100 MG tablet TAKE 1 TABLET BY MOUTH EVERY DAY 90 tablet 3    fluticasone (FLONASE) 50 MCG/ACT nasal spray 2 sprays by Nasal route daily 1 Bottle 5    ASPIRIN LOW DOSE 81 MG EC tablet TAKE 1 TABLET BY MOUTH DAILY 90 tablet 3    albuterol sulfate HFA (PROVENTIL HFA) 108 (90 Base) 06/08/2022    Flu vaccine (1) 09/01/2022    Breast cancer screen  02/25/2023    DTaP/Tdap/Td vaccine (2 - Td or Tdap) 03/26/2023    Cervical cancer screen  02/15/2024    Colorectal Cancer Screen  02/15/2029    Hepatitis C screen  Addressed    HIV screen  Addressed    Hepatitis A vaccine  Aged Out    Hepatitis B vaccine  Aged Out    Hib vaccine  Aged Out    Meningococcal (ACWY) vaccine  Aged Out       Subjective:      Review of Systems   Constitutional:  Negative for chills, fatigue and fever. HENT:  Negative for congestion. Respiratory: Negative. Cardiovascular: Negative. Gastrointestinal: Negative. Endocrine: Negative for polydipsia, polyphagia and polyuria. Genitourinary:  Negative for difficulty urinating and dysuria. Musculoskeletal:  Positive for arthralgias and myalgias. Negative for back pain, gait problem, joint swelling, neck pain and neck stiffness. Skin: Negative. Neurological:  Positive for weakness (right hand) and numbness (right hand). Negative for dizziness, facial asymmetry, light-headedness and headaches. Psychiatric/Behavioral:  Positive for agitation and sleep disturbance. Negative for dysphoric mood and suicidal ideas. The patient is nervous/anxious and is hyperactive. Objective:      /74   Pulse 86   Temp 98.5 °F (36.9 °C)   Resp 20   Ht 5' 1\" (1.549 m)   Wt 108 lb 6.4 oz (49.2 kg)   SpO2 96%   BMI 20.48 kg/m²      Physical Exam  Vitals and nursing note reviewed. Constitutional:       Appearance: She is not ill-appearing. HENT:      Right Ear: Tympanic membrane, ear canal and external ear normal.      Left Ear: Tympanic membrane, ear canal and external ear normal.      Nose: Nose normal.   Cardiovascular:      Rate and Rhythm: Normal rate and regular rhythm. Pulses: Normal pulses. Heart sounds: Normal heart sounds. No murmur heard. Pulmonary:      Effort: Pulmonary effort is normal. No respiratory distress.       Breath sounds: Normal breath sounds. Abdominal:      General: Abdomen is flat. Bowel sounds are normal. There is no distension. Palpations: Abdomen is soft. Musculoskeletal:      Right wrist: No swelling, deformity, effusion, bony tenderness, snuff box tenderness or crepitus. Normal range of motion. Normal pulse. Left wrist: Normal.      Comments: + phalen's and + tinnles, decreased hand grasp on right, no pain with supination of right arm and no pain with palpation of ulnar groove, no edema of right lower arm or wrist, ROM wrist normal     Skin:     General: Skin is warm and dry. Capillary Refill: Capillary refill takes less than 2 seconds. Neurological:      General: No focal deficit present. Mental Status: She is alert and oriented to person, place, and time. Psychiatric:         Attention and Perception: She is inattentive. Mood and Affect: Mood is anxious and elated. Speech: Speech is rapid and pressured. Behavior: Behavior is hyperactive. Thought Content: Thought content does not include homicidal or suicidal plan. Cognition and Memory: Cognition normal.         Judgment: Judgment is impulsive. Assessment/Plan:           1. Tendonitis of elbow, right  Avoid use of right arm for 5 days to let rest  Prednisone     2. Bipolar disorder, in partial remission, most recent episode mixed (HCC)  amoTRIgine (LAMICTAL) 25 MG tablet; TAKE 2 TABLETS BY MOUTH TWICE A DAY  Dispense: 360 tablet; Refill: 4    Restart meds  They were controlling her symptoms well   3. Carpal tunnel syndrome of right wrist  Avoid use for 5 days  Return if no better   - Wrist splint    4. Dysthymia  Restart zoloft   - sertraline (ZOLOFT) 50 MG tablet; 1 tab po daily  Dispense: 90 tablet; Refill: 4    5. Gastroesophageal reflux disease, unspecified whether esophagitis present  Lillian Maker diet   - pantoprazole (PROTONIX) 40 MG tablet; TAKE 1 TABLET BY MOUTH EVERY DAY  Dispense: 90 tablet;  Refill: 4  - famotidine (PEPCID) 20 MG tablet; Take 1 tablet by mouth in the morning and 1 tablet before bedtime. Dispense: 180 tablet; Refill: 4    6. Primary hypertension  controlled  - lisinopril-hydroCHLOROthiazide (PRINZIDE;ZESTORETIC) 10-12.5 MG per tablet; 1 tab po daily  Dispense: 90 tablet; Refill: 4      - 8. Paranoia ([facility] [address]  #2  - lamoTRIgine (LAMICTAL) 25 MG tablet; TAKE 2 TABLETS BY MOUTH TWICE A DAY  Dispense: 360 tablet; Refill: 4    9. Macrocytic anemia  - thiamine mononitrate 100 MG tablet; TAKE 1 TABLET BY MOUTH EVERY DAY  Dispense: 90 tablet; Refill: 4      Return in about 4 weeks (around 8/22/2022) for f/u carpal tunnel syndrome. Reccommended tobaccocessation options including pharmacologic methods, counseled great than 3 minutesduring this visit:  Yes[]  No  []       Patient given educational materials -see patient instructions. Discussed use, benefit, and side effects of prescribedmedications. All patient questions answered. Pt voiced understanding. Reviewedhealth maintenance. Instructed to continue current medications, diet and exercise. Patient agreed with treatment plan. Follow up as directed.        Electronicallysigned by MELLY Nayak CNP on 7/25/2022 at 5:41 PM

## 2023-02-01 ENCOUNTER — OFFICE VISIT (OUTPATIENT)
Dept: FAMILY MEDICINE CLINIC | Age: 63
End: 2023-02-01

## 2023-02-01 VITALS
OXYGEN SATURATION: 99 % | RESPIRATION RATE: 18 BRPM | WEIGHT: 110.6 LBS | BODY MASS INDEX: 20.88 KG/M2 | TEMPERATURE: 97.8 F | DIASTOLIC BLOOD PRESSURE: 78 MMHG | HEART RATE: 83 BPM | HEIGHT: 61 IN | SYSTOLIC BLOOD PRESSURE: 132 MMHG

## 2023-02-01 DIAGNOSIS — Z23 NEEDS FLU SHOT: ICD-10-CM

## 2023-02-01 DIAGNOSIS — M54.12 CERVICAL RADICULOPATHY: ICD-10-CM

## 2023-02-01 DIAGNOSIS — K64.4 EXTERNAL HEMORRHOID: ICD-10-CM

## 2023-02-01 DIAGNOSIS — F31.62 BIPOLAR DISORDER, CURRENT EPISODE MIXED, MODERATE (HCC): Primary | ICD-10-CM

## 2023-02-01 DIAGNOSIS — F34.1 DYSTHYMIA: ICD-10-CM

## 2023-02-01 DIAGNOSIS — K59.00 CONSTIPATION, UNSPECIFIED CONSTIPATION TYPE: ICD-10-CM

## 2023-02-01 DIAGNOSIS — F51.04 PSYCHOPHYSIOLOGICAL INSOMNIA: ICD-10-CM

## 2023-02-01 RX ORDER — HYDROCORTISONE ACETATE 25 MG/1
25 SUPPOSITORY RECTAL EVERY 12 HOURS
Qty: 12 SUPPOSITORY | Refills: 2 | Status: SHIPPED | OUTPATIENT
Start: 2023-02-01

## 2023-02-01 RX ORDER — TRAZODONE HYDROCHLORIDE 50 MG/1
50 TABLET ORAL NIGHTLY PRN
Qty: 30 TABLET | Refills: 5 | Status: SHIPPED | OUTPATIENT
Start: 2023-02-01

## 2023-02-01 RX ORDER — SERTRALINE HYDROCHLORIDE 100 MG/1
TABLET, FILM COATED ORAL
Qty: 90 TABLET | Refills: 4 | Status: SHIPPED | OUTPATIENT
Start: 2023-02-01

## 2023-02-01 RX ORDER — NAPROXEN 375 MG/1
375 TABLET ORAL 2 TIMES DAILY WITH MEALS
Qty: 180 TABLET | Refills: 1 | Status: SHIPPED | OUTPATIENT
Start: 2023-02-01

## 2023-02-01 SDOH — ECONOMIC STABILITY: FOOD INSECURITY: WITHIN THE PAST 12 MONTHS, YOU WORRIED THAT YOUR FOOD WOULD RUN OUT BEFORE YOU GOT MONEY TO BUY MORE.: NEVER TRUE

## 2023-02-01 SDOH — ECONOMIC STABILITY: HOUSING INSECURITY
IN THE LAST 12 MONTHS, WAS THERE A TIME WHEN YOU DID NOT HAVE A STEADY PLACE TO SLEEP OR SLEPT IN A SHELTER (INCLUDING NOW)?: NO

## 2023-02-01 SDOH — ECONOMIC STABILITY: FOOD INSECURITY: WITHIN THE PAST 12 MONTHS, THE FOOD YOU BOUGHT JUST DIDN'T LAST AND YOU DIDN'T HAVE MONEY TO GET MORE.: NEVER TRUE

## 2023-02-01 SDOH — ECONOMIC STABILITY: INCOME INSECURITY: HOW HARD IS IT FOR YOU TO PAY FOR THE VERY BASICS LIKE FOOD, HOUSING, MEDICAL CARE, AND HEATING?: NOT HARD AT ALL

## 2023-02-01 ASSESSMENT — PATIENT HEALTH QUESTIONNAIRE - PHQ9
1. LITTLE INTEREST OR PLEASURE IN DOING THINGS: 2
10. IF YOU CHECKED OFF ANY PROBLEMS, HOW DIFFICULT HAVE THESE PROBLEMS MADE IT FOR YOU TO DO YOUR WORK, TAKE CARE OF THINGS AT HOME, OR GET ALONG WITH OTHER PEOPLE: 1
3. TROUBLE FALLING OR STAYING ASLEEP: 1
2. FEELING DOWN, DEPRESSED OR HOPELESS: 1
SUM OF ALL RESPONSES TO PHQ QUESTIONS 1-9: 9
SUM OF ALL RESPONSES TO PHQ9 QUESTIONS 1 & 2: 3
8. MOVING OR SPEAKING SO SLOWLY THAT OTHER PEOPLE COULD HAVE NOTICED. OR THE OPPOSITE, BEING SO FIGETY OR RESTLESS THAT YOU HAVE BEEN MOVING AROUND A LOT MORE THAN USUAL: 1
4. FEELING TIRED OR HAVING LITTLE ENERGY: 1
9. THOUGHTS THAT YOU WOULD BE BETTER OFF DEAD, OR OF HURTING YOURSELF: 0
5. POOR APPETITE OR OVEREATING: 1
6. FEELING BAD ABOUT YOURSELF - OR THAT YOU ARE A FAILURE OR HAVE LET YOURSELF OR YOUR FAMILY DOWN: 1
SUM OF ALL RESPONSES TO PHQ QUESTIONS 1-9: 9
7. TROUBLE CONCENTRATING ON THINGS, SUCH AS READING THE NEWSPAPER OR WATCHING TELEVISION: 1
SUM OF ALL RESPONSES TO PHQ QUESTIONS 1-9: 9
SUM OF ALL RESPONSES TO PHQ QUESTIONS 1-9: 9

## 2023-02-01 ASSESSMENT — COLUMBIA-SUICIDE SEVERITY RATING SCALE - C-SSRS
2. HAVE YOU ACTUALLY HAD ANY THOUGHTS OF KILLING YOURSELF?: NO
1. WITHIN THE PAST MONTH, HAVE YOU WISHED YOU WERE DEAD OR WISHED YOU COULD GO TO SLEEP AND NOT WAKE UP?: NO
6. HAVE YOU EVER DONE ANYTHING, STARTED TO DO ANYTHING, OR PREPARED TO DO ANYTHING TO END YOUR LIFE?: NO

## 2023-02-01 NOTE — PROGRESS NOTES
Vaccine Information Sheet, \"Influenza - Inactivated\"  given to Jesús Brown, or parent/legal guardian of  Jesús Brown and verbalized understanding. Patient responses:    Have you ever had a reaction to a flu vaccine? No  Do you have an allergy to eggs, neomycin or polymixin? No  Do you have an allergy to Thimerosal, contact lens solution, or Merthiolate? No  Have you ever had Guillian [place]es[place] Syndrome? No  Do you have any current illness? No  Do you have a temperature above 100 degrees? No  Are you pregnant? No  If pregnant, permission obtained from physician? No  Do you have an active neurological disorder? No      Flu vaccine given per order. Please see immunization tab.

## 2023-02-01 NOTE — PROGRESS NOTES
SUBJECTIVE:  Dulce Poon is a 60 y.o. y/o female that presents with Hemorrhoids (Off and on bleeding from hemorrhoids )  . Pt here to address a few concerns. HPI:  Pain is present in the cervical region. Symptoms have been present for 2 month(s). The pain is intermittent, moderate. The patient describes the pain as aching, burning, throbbing, and tingling. Inciting injury or history of trauma? No  Pain is aggravated by - movement  Pain is relieved by - nothing  Radiation of the pain? Yes - pain starts at the base of neck and radiates down her right arm  Paresthesias of the extremities? Yes - she has intermittent N/T of right arm   Saddle anesthesia? No  Bowel or bladder incontinence? No  Treatments tried - ice, heat, and Tylenol      Pt also states she has a hemorrhoid in her rectum it bleeds sometimes has been using a cream but it is not working. Does have constipation at times and strains to have a BM  Depressed Mood    HPI:    Depressed Mood? yes - has been worse lately she had 2 nephews that have been killed  Anhedonia? yes - most days   Appetite changes? yes - eating less  Sleep disturbances? yes - can;t fall asleep  had been started on lamictal for moods but does not think she is taking it. Feelings of guilt? no  Decreased energy? yes - most days   Impaired concentration? no  Substance abuse? no    Suicidal/Homicidal Ideation? no      Compliant with meds: No: not taking lamictal is taking zoloft will increase to 100 mg daily   Med side effects: no   Sees therapist?:  no  Family History of Mental Illness?   yes - advised to restart lamictal and will add trazodone for sleep     Review of Systems - Psychological ROS: positive for - anxiety, depression, and irritability, negative for - suicidal ideation       OBJECTIVE:  /78   Pulse 83   Temp 97.8 °F (36.6 °C)   Resp 18   Ht 5' 1\" (1.549 m)   Wt 110 lb 9.6 oz (50.2 kg)   SpO2 99%   BMI 20.90 kg/m²   Physical Examination: General appearance - alert, well appearing, and in no distress  Chest - clear to auscultation, no wheezes, rales or rhonchi, symmetric air entry  Heart - normal rate, regular rhythm, normal S1, S2, no murmurs, rubs, clicks or gallops  Back exam - pain with motion noted during exam, tenderness noted with palpation of right trapezius and supraspinatus muscle. ,  5/5 strength globally and symmetrically in the LEs  Extremities - peripheral pulses normal, no pedal edema, no clubbing or cyanosis ROM neck and right shoulder and arm normal with active and passive ROM  Skin -  Skin color, texture, turgor normal. No rashes or lesions. Psych - Affect normal, no evidence of depression or anxiety     exam with external non thrombosed hemorrhoid noted, stool OB negative     ASSESSMENT & PLAN  Kiley Cuff was seen today for hemorrhoids. Diagnoses and all orders for this visit:    Bipolar disorder, current episode mixed, moderate (HCC)  Restart lamictal  Increase zoloft 100 mg   Psychophysiological insomnia  trazodone  External hemorrhoid  -     AFL - Vicki Taylor MD, Gastroenterology, [place]  -     hydrocortisone (ANUSOL-HC) 25 MG suppository; Place 1 suppository rectally in the morning and 1 suppository in the evening. Cervical radiculopathy  Naprosyn  Await x-ray offered pt PT and she denied  -     XR CERVICAL SPINE (4-5 VIEWS); Future    Dysthymia  -     sertraline (ZOLOFT) 100 MG tablet; 1 tab po daily    Needs flu shot    Constipation, unspecified constipation type  High fiber diet  Stool softener  Other orders  -     traZODone (DESYREL) 50 MG tablet; Take 1 tablet by mouth nightly as needed for Sleep  -     naproxen (NAPROSYN) 375 MG tablet;  Take 1 tablet by mouth 2 times daily (with meals)  -     Influenza, FLUCELVAX, (age 10 mo+), IM, Preservative Free, 0.5 mL  Over 32 minutes spent on visit time discussing conditions with pt educating on SE of medications and developing a plan of care over 50% of visit time spent face to face.     Return if symptoms worsen or fail to improve.      -Presentation is consistent with foraminal stenosis of cervical neck area vs pinched nerve   -Start above treatments  -Patient advised to contact our office immediately if symptoms worsen or persist  -Patient counseled on conservative care including activity modification and OTC meds    All patient questions answered. Patient voiced understanding.

## 2023-02-01 NOTE — PROGRESS NOTES
Immunization(s) given during visit:    Immunizations Administered       Name Date Dose Route    Influenza, FLUCELVAX, (age 10 mo+), MDCK, PF, 0.5mL 2/1/2023 0.5 mL Intramuscular    Site: Deltoid- Left    Lot: [id number]    NDC: 70530-673-46

## 2023-02-23 RX ORDER — TRAZODONE HYDROCHLORIDE 50 MG/1
TABLET ORAL
Qty: 30 TABLET | Refills: 5 | Status: SHIPPED | OUTPATIENT
Start: 2023-02-23

## 2023-05-04 ENCOUNTER — APPOINTMENT (OUTPATIENT)
Dept: GENERAL RADIOLOGY | Age: 63
End: 2023-05-04
Payer: COMMERCIAL

## 2023-05-04 ENCOUNTER — HOSPITAL ENCOUNTER (EMERGENCY)
Age: 63
Discharge: HOME | End: 2023-05-04
Attending: EMERGENCY MEDICINE
Payer: COMMERCIAL

## 2023-05-04 VITALS
SYSTOLIC BLOOD PRESSURE: 151 MMHG | DIASTOLIC BLOOD PRESSURE: 81 MMHG | BODY MASS INDEX: 21.14 KG/M2 | WEIGHT: 112 LBS | TEMPERATURE: 98.2 F | HEART RATE: 83 BPM | RESPIRATION RATE: 16 BRPM | HEIGHT: 61 IN | OXYGEN SATURATION: 98 %

## 2023-05-04 DIAGNOSIS — S62.101A CLOSED FRACTURE OF RIGHT WRIST, INITIAL ENCOUNTER: Primary | ICD-10-CM

## 2023-05-04 PROCEDURE — 99283 EMERGENCY DEPT VISIT LOW MDM: CPT

## 2023-05-04 PROCEDURE — 29125 APPL SHORT ARM SPLINT STATIC: CPT

## 2023-05-04 PROCEDURE — 73110 X-RAY EXAM OF WRIST: CPT

## 2023-05-04 PROCEDURE — 6370000000 HC RX 637 (ALT 250 FOR IP)

## 2023-05-04 RX ORDER — HYDROCODONE BITARTRATE AND ACETAMINOPHEN 5; 325 MG/1; MG/1
1 TABLET ORAL EVERY 4 HOURS PRN
Qty: 18 TABLET | Refills: 0 | Status: SHIPPED | OUTPATIENT
Start: 2023-05-04 | End: 2023-05-07

## 2023-05-04 RX ORDER — HYDROCODONE BITARTRATE AND ACETAMINOPHEN 5; 325 MG/1; MG/1
1 TABLET ORAL ONCE
Status: COMPLETED | OUTPATIENT
Start: 2023-05-04 | End: 2023-05-04

## 2023-05-04 RX ADMIN — HYDROCODONE BITARTRATE AND ACETAMINOPHEN 1 TABLET: 5; 325 TABLET ORAL at 13:41

## 2023-05-04 ASSESSMENT — PAIN SCALES - GENERAL: PAINLEVEL_OUTOF10: 10

## 2023-05-04 NOTE — ED NOTES
Pt to intake B states she picked up a 'heavy iron bar' a week ago and attempted to swing it at something when she states she felt a pop in her wrist. Right wrist appears swollen, pt states painful to move in certain ways/directions, taking OTC Advil w/o relief.       Fahad Graham RN  05/04/23 1299

## 2023-05-04 NOTE — ED PROVIDER NOTES
[facility] [address] [address] EMERGENCY DEPT      EMERGENCY MEDICINE     Pt Name: Marybelle Krabbe  MRN: [id number]  [place] 1960  Date of evaluation: 2023  Provider: Tess Hollingsworth MD, [address]       Chief Complaint   Patient presents with    Wrist Injury     Pain/swelling x 1 week     HISTORY OF PRESENT ILLNESS   Marybelle Krabbe is a pleasant 60 y.o. female who presents to the emergency department for evaluation of right wrist pain. Per the patient, she was trying to swing a metal pole at an animal in her yard to scare it away and felt a pop and hurt her wrist.     Patient states that her wrist immediately started swelling. She states this happened three days ago. She tried using capscascin cream which did not work. She says she has been putting ice on it which has also not given her relief. Patient also tried taking advil. Patient denies any numbness or tingling, has limited range of motion due to pain. Denies any chest pain, shortness of breath, abdominal pain. All other review of systems negative.     PASTMEDICAL HISTORY/Co-Morbid Conditions:     Past Medical History:   Diagnosis Date    Anxiety     Asthma     Bipolar disorder ([address]     Blood clotting disorder ([address] 2014    was on coumadin until 2014    Breast cyst 7-8 yrs ago    rt    Depression     Duodenal ulcer 2019    GERD (gastroesophageal reflux disease)     Hyperlipidemia     Hypertension     Schizophrenia ([address]        Patient Active Problem List   Diagnosis Code    Alcohol abuse F10.10    Hypertension I10    Hyperlipidemia E78.5    GERD (gastroesophageal reflux disease) K21.9    Adenomatous colon polyp D12.6    Intermittent palpitations R00.2    Tobacco abuse Z72.0    Bipolar disorder, current episode mixed, moderate ([address] F31.62     SURGICAL HISTORY       Past Surgical History:   Procedure Laterality Date    BREAST SURGERY  10 yrs    lump right     SECTION      x 1    ELBOW SURGERY Left 2013

## 2023-05-04 NOTE — DISCHARGE INSTRUCTIONS
Please follow up with [facility] this week for further care of your wrist fracture. Please return to the ED if you develop worsening wrist pain, notice redness, swelling, or warmth to the wrist. You may take the pain medications as needed. Please keep the splint on until you are seen by [facility], use ice and rest your wrist.     Thank you for giving us the opportunity to care for you today.

## 2023-05-04 NOTE — ED PROVIDER NOTES
PATIENT NAME: Kieran Blanco  MRN: [id number]  : 1960  BYRD: 2023    I performed a history and physical examination of the patient and discussed management with the Resident. I reviewed the Resident's note and agree with the documented findings and plan of care. Any areas of disagreement are noted on the chart. I was personally present for the key portions of any procedures and have documented in the chart those procedures where I was not present during the key portions. I have reviewed the emergency nurses triage note and agree with the chief complaint, past medical history, past surgical history, allergies, medications, social and family history as documented unless otherwise noted below. MEDICAL DEDISION MAKING (MDM)     Kieran Blanco is a 60 y.o. female who presents to Emergency Department with Wrist Injury (Pain/swelling x 1 week)     Patient presents for evaluation of right wrist pain and injury. This happened about a week ago. She states that she picked up a heavy metal bar and attempted to swing at and hit right wrist.  Patient felt a pop then. Patient has been having persistent pain since then with persistent right wrist swelling. Pain is rated at 10/10. Physical exam: AVSS. Cardiopulmonary exam is unremarkable. Abdomen is soft. Neurologically intact. Musculoskeletal: Diffuse right wrist tenderness, swelling, and limited range of motion. Tenderness is worse on the distal ulnar area. Right hand has intact neurovascular exam.    X-rays of the right wrist shows a nondisplaced comminuted fracture of the distal ulnar, a tiny avulsion fracture of the ulnar styloid and marked osteopenia. ED treatment include oral Norco and ulnar gutter splint. Discharged with Ortho follow-up in a week.     Vitals:    23 1248   BP: (!) 151/81   Pulse: 83   Resp: 16   Temp: 98.2 °F (36.8 °C)   TempSrc: Oral   SpO2: 98%   Weight: 112 lb (50.8 kg)   Height: 5' 1\" (1.549 m)     Medications

## 2023-10-12 ENCOUNTER — TELEPHONE (OUTPATIENT)
Dept: FAMILY MEDICINE CLINIC | Age: 63
End: 2023-10-12

## 2023-10-12 DIAGNOSIS — Z12.31 ENCOUNTER FOR SCREENING MAMMOGRAM FOR MALIGNANT NEOPLASM OF BREAST: Primary | ICD-10-CM

## 2023-10-12 NOTE — TELEPHONE ENCOUNTER
Pt requested order to be mailed to her home, she will schedule after she gets other medical concerns taken care of.

## 2023-11-16 ENCOUNTER — HOSPITAL ENCOUNTER (OUTPATIENT)
Dept: CT IMAGING | Age: 63
Discharge: HOME | End: 2023-11-16
Attending: INTERNAL MEDICINE
Payer: COMMERCIAL

## 2023-11-16 DIAGNOSIS — R10.84 ABDOMINAL PAIN, GENERALIZED: ICD-10-CM

## 2023-11-16 DIAGNOSIS — R11.2 NAUSEA AND VOMITING, UNSPECIFIED VOMITING TYPE: ICD-10-CM

## 2023-11-16 LAB — POC CREATININE WHOLE BLOOD: 0.8 MG/DL (ref 0.5–1.2)

## 2023-11-16 PROCEDURE — 82565 ASSAY OF CREATININE: CPT

## 2023-11-16 PROCEDURE — 6360000004 HC RX CONTRAST MEDICATION: Performed by: INTERNAL MEDICINE

## 2023-11-16 PROCEDURE — 74177 CT ABD & PELVIS W/CONTRAST: CPT

## 2023-11-16 RX ADMIN — IOPAMIDOL 80 ML: 755 INJECTION, SOLUTION INTRAVENOUS at 08:29

## 2024-02-27 ENCOUNTER — OFFICE VISIT (OUTPATIENT)
Dept: FAMILY MEDICINE CLINIC | Age: 64
End: 2024-02-27
Payer: COMMERCIAL

## 2024-02-27 VITALS
TEMPERATURE: 97.7 F | OXYGEN SATURATION: 97 % | BODY MASS INDEX: 21.83 KG/M2 | WEIGHT: 115.6 LBS | DIASTOLIC BLOOD PRESSURE: 86 MMHG | SYSTOLIC BLOOD PRESSURE: 144 MMHG | RESPIRATION RATE: 16 BRPM | HEART RATE: 88 BPM | HEIGHT: 61 IN

## 2024-02-27 DIAGNOSIS — Z23 IMMUNIZATION DUE: ICD-10-CM

## 2024-02-27 DIAGNOSIS — F10.10 ALCOHOL ABUSE: ICD-10-CM

## 2024-02-27 DIAGNOSIS — F34.1 DYSTHYMIA: ICD-10-CM

## 2024-02-27 DIAGNOSIS — E78.5 HYPERLIPIDEMIA, UNSPECIFIED HYPERLIPIDEMIA TYPE: ICD-10-CM

## 2024-02-27 DIAGNOSIS — Z12.31 ENCOUNTER FOR SCREENING MAMMOGRAM FOR MALIGNANT NEOPLASM OF BREAST: ICD-10-CM

## 2024-02-27 DIAGNOSIS — J30.1 SEASONAL ALLERGIC RHINITIS DUE TO POLLEN: ICD-10-CM

## 2024-02-27 DIAGNOSIS — K21.9 GASTROESOPHAGEAL REFLUX DISEASE, UNSPECIFIED WHETHER ESOPHAGITIS PRESENT: ICD-10-CM

## 2024-02-27 DIAGNOSIS — Z00.00 ENCOUNTER FOR WELL ADULT EXAM WITHOUT ABNORMAL FINDINGS: Primary | ICD-10-CM

## 2024-02-27 DIAGNOSIS — F51.04 PSYCHOPHYSIOLOGICAL INSOMNIA: ICD-10-CM

## 2024-02-27 DIAGNOSIS — R29.898 RIGHT LEG WEAKNESS: ICD-10-CM

## 2024-02-27 DIAGNOSIS — E78.2 MIXED HYPERLIPIDEMIA: ICD-10-CM

## 2024-02-27 DIAGNOSIS — J30.0 CHRONIC VASOMOTOR RHINITIS: ICD-10-CM

## 2024-02-27 DIAGNOSIS — R53.83 OTHER FATIGUE: ICD-10-CM

## 2024-02-27 DIAGNOSIS — D53.9 MACROCYTIC ANEMIA: ICD-10-CM

## 2024-02-27 DIAGNOSIS — F31.62 BIPOLAR DISORDER, CURRENT EPISODE MIXED, MODERATE (HCC): ICD-10-CM

## 2024-02-27 DIAGNOSIS — I10 PRIMARY HYPERTENSION: ICD-10-CM

## 2024-02-27 DIAGNOSIS — F22 PARANOIA (HCC): ICD-10-CM

## 2024-02-27 DIAGNOSIS — Z00.01 ENCOUNTER FOR GENERAL ADULT MEDICAL EXAMINATION WITH ABNORMAL FINDINGS: ICD-10-CM

## 2024-02-27 DIAGNOSIS — Z91.199 MEDICALLY NONCOMPLIANT: ICD-10-CM

## 2024-02-27 PROCEDURE — G8427 DOCREV CUR MEDS BY ELIG CLIN: HCPCS | Performed by: NURSE PRACTITIONER

## 2024-02-27 PROCEDURE — G8482 FLU IMMUNIZE ORDER/ADMIN: HCPCS | Performed by: NURSE PRACTITIONER

## 2024-02-27 PROCEDURE — 90715 TDAP VACCINE 7 YRS/> IM: CPT | Performed by: NURSE PRACTITIONER

## 2024-02-27 PROCEDURE — 90472 IMMUNIZATION ADMIN EACH ADD: CPT | Performed by: NURSE PRACTITIONER

## 2024-02-27 PROCEDURE — 99396 PREV VISIT EST AGE 40-64: CPT | Performed by: NURSE PRACTITIONER

## 2024-02-27 PROCEDURE — 90471 IMMUNIZATION ADMIN: CPT | Performed by: NURSE PRACTITIONER

## 2024-02-27 PROCEDURE — 99214 OFFICE O/P EST MOD 30 MIN: CPT | Performed by: NURSE PRACTITIONER

## 2024-02-27 PROCEDURE — 90677 PCV20 VACCINE IM: CPT | Performed by: NURSE PRACTITIONER

## 2024-02-27 PROCEDURE — 3079F DIAST BP 80-89 MM HG: CPT | Performed by: NURSE PRACTITIONER

## 2024-02-27 PROCEDURE — G8420 CALC BMI NORM PARAMETERS: HCPCS | Performed by: NURSE PRACTITIONER

## 2024-02-27 PROCEDURE — 3077F SYST BP >= 140 MM HG: CPT | Performed by: NURSE PRACTITIONER

## 2024-02-27 PROCEDURE — 3017F COLORECTAL CA SCREEN DOC REV: CPT | Performed by: NURSE PRACTITIONER

## 2024-02-27 PROCEDURE — 4004F PT TOBACCO SCREEN RCVD TLK: CPT | Performed by: NURSE PRACTITIONER

## 2024-02-27 RX ORDER — FLUTICASONE PROPIONATE 50 MCG
2 SPRAY, SUSPENSION (ML) NASAL DAILY
Qty: 1 EACH | Refills: 5 | Status: SHIPPED | OUTPATIENT
Start: 2024-02-27

## 2024-02-27 RX ORDER — PANTOPRAZOLE SODIUM 40 MG/1
TABLET, DELAYED RELEASE ORAL
Qty: 90 TABLET | Refills: 4 | Status: SHIPPED | OUTPATIENT
Start: 2024-02-27

## 2024-02-27 RX ORDER — LAMOTRIGINE 25 MG/1
TABLET ORAL
Qty: 360 TABLET | Refills: 4 | Status: SHIPPED | OUTPATIENT
Start: 2024-02-27

## 2024-02-27 RX ORDER — LORATADINE 10 MG/1
10 TABLET ORAL DAILY
Qty: 30 TABLET | Refills: 11 | Status: SHIPPED | OUTPATIENT
Start: 2024-02-27

## 2024-02-27 RX ORDER — GAUZE BANDAGE 2" X 2"
BANDAGE TOPICAL
Qty: 90 TABLET | Refills: 4 | Status: SHIPPED | OUTPATIENT
Start: 2024-02-27

## 2024-02-27 RX ORDER — FOLIC ACID 1 MG/1
1 TABLET ORAL DAILY
Qty: 90 TABLET | Refills: 4 | Status: SHIPPED | OUTPATIENT
Start: 2024-02-27

## 2024-02-27 RX ORDER — GAUZE BANDAGE 2" X 2"
100 BANDAGE TOPICAL DAILY
Qty: 90 TABLET | Refills: 3 | Status: SHIPPED | OUTPATIENT
Start: 2024-02-27

## 2024-02-27 RX ORDER — SERTRALINE HYDROCHLORIDE 100 MG/1
TABLET, FILM COATED ORAL
Qty: 90 TABLET | Refills: 4 | Status: SHIPPED | OUTPATIENT
Start: 2024-02-27

## 2024-02-27 RX ORDER — LISINOPRIL AND HYDROCHLOROTHIAZIDE 12.5; 1 MG/1; MG/1
TABLET ORAL
Qty: 90 TABLET | Refills: 4 | Status: SHIPPED | OUTPATIENT
Start: 2024-02-27

## 2024-02-27 RX ORDER — TRAZODONE HYDROCHLORIDE 50 MG/1
50 TABLET ORAL DAILY PRN
Qty: 90 TABLET | Refills: 4 | Status: SHIPPED | OUTPATIENT
Start: 2024-02-27

## 2024-02-27 RX ORDER — NAPROXEN 375 MG/1
375 TABLET ORAL 2 TIMES DAILY WITH MEALS
Qty: 180 TABLET | Refills: 1 | Status: SHIPPED | OUTPATIENT
Start: 2024-02-27

## 2024-02-27 RX ORDER — SIMVASTATIN 40 MG
40 TABLET ORAL NIGHTLY
Qty: 90 TABLET | Refills: 4 | Status: SHIPPED | OUTPATIENT
Start: 2024-02-27

## 2024-02-27 SDOH — ECONOMIC STABILITY: INCOME INSECURITY: HOW HARD IS IT FOR YOU TO PAY FOR THE VERY BASICS LIKE FOOD, HOUSING, MEDICAL CARE, AND HEATING?: NOT VERY HARD

## 2024-02-27 SDOH — ECONOMIC STABILITY: FOOD INSECURITY: WITHIN THE PAST 12 MONTHS, THE FOOD YOU BOUGHT JUST DIDN'T LAST AND YOU DIDN'T HAVE MONEY TO GET MORE.: NEVER TRUE

## 2024-02-27 ASSESSMENT — PATIENT HEALTH QUESTIONNAIRE - PHQ9
SUM OF ALL RESPONSES TO PHQ QUESTIONS 1-9: 6
4. FEELING TIRED OR HAVING LITTLE ENERGY: 1
SUM OF ALL RESPONSES TO PHQ QUESTIONS 1-9: 6
SUM OF ALL RESPONSES TO PHQ QUESTIONS 1-9: 6
9. THOUGHTS THAT YOU WOULD BE BETTER OFF DEAD, OR OF HURTING YOURSELF: 0
7. TROUBLE CONCENTRATING ON THINGS, SUCH AS READING THE NEWSPAPER OR WATCHING TELEVISION: 0
SUM OF ALL RESPONSES TO PHQ QUESTIONS 1-9: 6
8. MOVING OR SPEAKING SO SLOWLY THAT OTHER PEOPLE COULD HAVE NOTICED. OR THE OPPOSITE, BEING SO FIGETY OR RESTLESS THAT YOU HAVE BEEN MOVING AROUND A LOT MORE THAN USUAL: 1
6. FEELING BAD ABOUT YOURSELF - OR THAT YOU ARE A FAILURE OR HAVE LET YOURSELF OR YOUR FAMILY DOWN: 0
SUM OF ALL RESPONSES TO PHQ9 QUESTIONS 1 & 2: 2
5. POOR APPETITE OR OVEREATING: 1
3. TROUBLE FALLING OR STAYING ASLEEP: 1
1. LITTLE INTEREST OR PLEASURE IN DOING THINGS: 1
2. FEELING DOWN, DEPRESSED OR HOPELESS: 1
10. IF YOU CHECKED OFF ANY PROBLEMS, HOW DIFFICULT HAVE THESE PROBLEMS MADE IT FOR YOU TO DO YOUR WORK, TAKE CARE OF THINGS AT HOME, OR GET ALONG WITH OTHER PEOPLE: 1

## 2024-02-27 ASSESSMENT — ENCOUNTER SYMPTOMS
SINUS PAIN: 0
RESPIRATORY NEGATIVE: 1
RHINORRHEA: 1
SINUS PRESSURE: 0
TROUBLE SWALLOWING: 0
GASTROINTESTINAL NEGATIVE: 1

## 2024-02-27 ASSESSMENT — COLUMBIA-SUICIDE SEVERITY RATING SCALE - C-SSRS
6. HAVE YOU EVER DONE ANYTHING, STARTED TO DO ANYTHING, OR PREPARED TO DO ANYTHING TO END YOUR LIFE?: NO
1. WITHIN THE PAST MONTH, HAVE YOU WISHED YOU WERE DEAD OR WISHED YOU COULD GO TO SLEEP AND NOT WAKE UP?: NO
2. HAVE YOU ACTUALLY HAD ANY THOUGHTS OF KILLING YOURSELF?: NO

## 2024-02-27 NOTE — PROGRESS NOTES
Immunization(s) given during visit:    Immunizations Administered       Name Date Dose Route    Pneumococcal, PCV20, PREVNAR 20, (age 6w+), IM, 0.5mL 2/27/2024 0.5 mL Intramuscular    Site: Deltoid- Left    Lot: [id number]    NDC: 0703-3924-64    TDaP, ADACEL (age 10y-64y), BOOSTRIX (age 10y+), IM, 0.5mL 2/27/2024 0.5 mL Intramuscular    Site: Deltoid- Left    Lot: [id number]    NDC: 08420-656-10            Most recent Vaccine Information Sheet dated 8/16/21 given to pt

## 2024-02-27 NOTE — PROGRESS NOTES
Well Adult Note  Name: Santiago Wiseman Today’s Date: 2024   MRN: [id number] Sex: Female   Age: 63 y.o. Ethnicity: Non- / Non    : 1960 Race: Black /       Santiago Wiseman is here for well adult exam.  History:    Pt here for   a physical     PMH: HTN, hyperlipidemia, DVT, bipolar GERD.    Pt states she fell on her right knee in January last month, went to ER had an x-ray it was negative for a fracture but she developed a hematoma of the knee,  It continues to hurt and ache with activity sometimes it makes her right leg feel weal  Has not fallen.  Not taking any meds  Sometimes it aches at hs and keeps her up.        Stopped her lamictal and has not been sleeping her moods have been  all over the place  Has run out of other meds also    Wants iron checked hx of BARBARA     HTN  -taking prinzide, zocor 40 mg daily,   No symptoms     Bipolar  -is to be taking zoloft 100 mg daily, lamictal 25 mg daily   Symptoms not controlled  Not able to sleep at hs   Moods all over the place     Hx macrocytic anemia   -taking thiamine, folic acid and daily MVI   Does continue to drink   Declines AAA      GERD  -controlled with she takes protonix 40 mg daily     Allergic rhinitis and nasal congestion controlled with claritin and flonase but she has run out.     Due to update labs.     Last colonoscopy   Last PAP   Last mammogram          Review of Systems   Constitutional:  Positive for fatigue. Negative for chills and fever.   HENT:  Positive for congestion and rhinorrhea. Negative for sinus pressure, sinus pain, tinnitus and trouble swallowing.    Respiratory: Negative.     Cardiovascular: Negative.    Gastrointestinal: Negative.         Vomiting has stopped    Endocrine: Negative for polydipsia, polyphagia and polyuria.   Genitourinary: Negative.    Musculoskeletal:  Positive for arthralgias, gait problem (due to right knee pain and some mild edema) and myalgias. Negative for

## 2024-02-27 NOTE — PATIENT INSTRUCTIONS
Advance Care Planning     Advance Care Planning opens a door to talk about and write down your wishes before a sudden accident or illness.  Make your goals, values, and preferences known.     This puts you in the ’s seat and helps others know what matters most to you so they won’t have to guess.      Where can you learn more?    Go to [email or web address]   to learn how to:    Name someone you trust to make healthcare decisions for you, only if you can’t. (Healthcare Power of )    Document your wishes for care if you were seriously ill and not expected to recover or are approaching end of life. (Advance Directive or Living Will)    The same page can be found using the QR code below.                Well Visit, Women 50 to 65: Care Instructions  Overview     Well visits can help you stay healthy. Your doctor has checked your overall health and may have suggested ways to take good care of yourself. Your doctor also may have recommended tests. At home, you can help prevent illness with healthy eating, regular exercise, and other steps.  Follow-up care is a key part of your treatment and safety. Be sure to make and go to all appointments, and call your doctor if you are having problems. It's also a good idea to know your test results and keep a list of the medicines you take.  How can you care for yourself at home?  Get screening tests that you and your doctor decide on. Screening helps find diseases before any symptoms appear.  Eat healthy foods. Choose fruits, vegetables, whole grains, protein, and low-fat dairy foods. Limit fat, especially saturated fat. Reduce salt in your diet.  Limit alcohol. Have no more than 1 drink a day or 7 drinks a week.  Get at least 30 minutes of exercise on most days of the week. Walking is a good choice. You also may want to do other activities, such as running, swimming, cycling, or playing tennis or team sports.  Reach and stay at a

## 2024-03-28 ENCOUNTER — TELEPHONE (OUTPATIENT)
Dept: FAMILY MEDICINE CLINIC | Age: 64
End: 2024-03-28

## 2024-03-28 NOTE — TELEPHONE ENCOUNTER
I recommend she start mucinex to loosen the mucous up , warm salt water gargles , conitnue with albuterol for cough, if develops a fever or wheezing contact us so we can order additional meds

## 2024-03-28 NOTE — TELEPHONE ENCOUNTER
Pt called stating she has symptoms of a sore throat and chest congestion. Pt states this is related to her allergies. Pt is using her flonase and albuterol as needed but doesn't feel this is working.  Pt  has no other symptoms.    Please advise      Skyler Diaz

## 2024-04-03 ENCOUNTER — HOSPITAL ENCOUNTER (OUTPATIENT)
Dept: WOMENS IMAGING | Age: 64
Discharge: HOME | End: 2024-04-03
Payer: COMMERCIAL

## 2024-04-03 ENCOUNTER — NURSE ONLY (OUTPATIENT)
Dept: LAB | Age: 64
End: 2024-04-03

## 2024-04-03 DIAGNOSIS — Z00.00 ENCOUNTER FOR WELL ADULT EXAM WITHOUT ABNORMAL FINDINGS: ICD-10-CM

## 2024-04-03 DIAGNOSIS — R53.83 OTHER FATIGUE: ICD-10-CM

## 2024-04-03 DIAGNOSIS — E78.2 MIXED HYPERLIPIDEMIA: ICD-10-CM

## 2024-04-03 DIAGNOSIS — R74.01 TRANSAMINITIS: Primary | ICD-10-CM

## 2024-04-03 DIAGNOSIS — F31.62 BIPOLAR DISORDER, CURRENT EPISODE MIXED, MODERATE (HCC): ICD-10-CM

## 2024-04-03 DIAGNOSIS — Z12.31 ENCOUNTER FOR SCREENING MAMMOGRAM FOR MALIGNANT NEOPLASM OF BREAST: ICD-10-CM

## 2024-04-03 DIAGNOSIS — I10 PRIMARY HYPERTENSION: ICD-10-CM

## 2024-04-03 LAB
ALBUMIN SERPL BCG-MCNC: 3.8 G/DL (ref 3.5–5.1)
ALP SERPL-CCNC: 167 U/L (ref 38–126)
ALT SERPL W/O P-5'-P-CCNC: 62 U/L (ref 11–66)
ANION GAP SERPL CALC-SCNC: 15 MEQ/L (ref 8–16)
AST SERPL-CCNC: 203 U/L (ref 5–40)
BASOPHILS ABSOLUTE: 0.1 THOU/MM3 (ref 0–0.1)
BASOPHILS NFR BLD AUTO: 0.6 %
BILIRUB SERPL-MCNC: 1 MG/DL (ref 0.3–1.2)
BUN SERPL-MCNC: 4 MG/DL (ref 7–22)
CALCIUM SERPL-MCNC: 8.6 MG/DL (ref 8.5–10.5)
CHLORIDE SERPL-SCNC: 102 MEQ/L (ref 98–111)
CHOLEST SERPL-MCNC: 238 MG/DL (ref 100–199)
CO2 SERPL-SCNC: 21 MEQ/L (ref 23–33)
CREAT SERPL-MCNC: 0.5 MG/DL (ref 0.4–1.2)
DEPRECATED RDW RBC AUTO: 43.9 FL (ref 35–45)
EOSINOPHIL NFR BLD AUTO: 1.8 %
EOSINOPHILS ABSOLUTE: 0.2 THOU/MM3 (ref 0–0.4)
ERYTHROCYTE [DISTWIDTH] IN BLOOD BY AUTOMATED COUNT: 12.2 % (ref 11.5–14.5)
FOLATE SERPL-MCNC: 4.6 NG/ML (ref 4.8–24.2)
GFR SERPL CREATININE-BSD FRML MDRD: > 90 ML/MIN/1.73M2
GLUCOSE SERPL-MCNC: 103 MG/DL (ref 70–108)
HCT VFR BLD AUTO: 42.1 % (ref 37–47)
HDLC SERPL-MCNC: 95 MG/DL
HGB BLD-MCNC: 14.7 GM/DL (ref 12–16)
IMM GRANULOCYTES # BLD AUTO: 0.02 THOU/MM3 (ref 0–0.07)
IMM GRANULOCYTES NFR BLD AUTO: 0.2 %
IRON SATN MFR SERPL: 74 % (ref 20–50)
IRON SERPL-MCNC: 192 UG/DL (ref 50–170)
LDLC SERPL CALC-MCNC: 121 MG/DL
LYMPHOCYTES ABSOLUTE: 2 THOU/MM3 (ref 1–4.8)
LYMPHOCYTES NFR BLD AUTO: 21.8 %
MCH RBC QN AUTO: 34 PG (ref 26–33)
MCHC RBC AUTO-ENTMCNC: 34.9 GM/DL (ref 32.2–35.5)
MCV RBC AUTO: 97.5 FL (ref 81–99)
MONOCYTES ABSOLUTE: 1 THOU/MM3 (ref 0.4–1.3)
MONOCYTES NFR BLD AUTO: 10.8 %
NEUTROPHILS NFR BLD AUTO: 64.8 %
NRBC BLD AUTO-RTO: 0 /100 WBC
PLATELET # BLD AUTO: 205 THOU/MM3 (ref 130–400)
PMV BLD AUTO: 10.2 FL (ref 9.4–12.4)
POTASSIUM SERPL-SCNC: 3.8 MEQ/L (ref 3.5–5.2)
PROT SERPL-MCNC: 7.8 G/DL (ref 6.1–8)
RBC # BLD AUTO: 4.32 MILL/MM3 (ref 4.2–5.4)
SEGMENTED NEUTROPHILS ABSOLUTE COUNT: 6 THOU/MM3 (ref 1.8–7.7)
SODIUM SERPL-SCNC: 138 MEQ/L (ref 135–145)
TIBC SERPL-MCNC: 260 UG/DL (ref 171–450)
TRIGL SERPL-MCNC: 108 MG/DL (ref 0–199)
TSH SERPL DL<=0.005 MIU/L-ACNC: 1.15 UIU/ML (ref 0.4–4.2)
VIT B12 SERPL-MCNC: 619 PG/ML (ref 211–911)
WBC # BLD AUTO: 9.3 THOU/MM3 (ref 4.8–10.8)

## 2024-04-03 PROCEDURE — 77063 BREAST TOMOSYNTHESIS BI: CPT

## 2024-04-04 ENCOUNTER — TELEPHONE (OUTPATIENT)
Dept: FAMILY MEDICINE CLINIC | Age: 64
End: 2024-04-04

## 2024-04-04 NOTE — TELEPHONE ENCOUNTER
----- Message from MELLY Villavicencio - CNP sent at 4/3/2024  5:41 PM EDT -----  Let pt know hr liver enzymes have elevated quite high,  ask her if she has been drinking, any nausea or RUQ pain ? I want her to have an u/s of the liver and some more labs to assess the function of her liver or causes for the elevated enzymes. I want her ot have them in the next day or two but no longer.  Her iron levels are high which can affect the functioning of the liver, stop any MVI or extra iron she may be taking.  Does she have a GI provider and has she seen them lately? If so please get their office notes.

## 2024-04-04 NOTE — TELEPHONE ENCOUNTER
----- Message from MELLY Villavicencio CNP sent at 4/3/2024  5:44 PM EDT -----  IMPRESSION:  No mammographic evidence of malignancy. A 1 year screening mammogram is recommended.     A result letter will be sent to the patient.  She will also receive a reminder 1 month prior to her next mammogram.     Given the finding of dense breast tissue, this patient is a candidate for automated whole breast screening ultrasound (ABUS) to aid in breast cancer detection.  If wanting the aBUS I will order

## 2024-04-04 NOTE — TELEPHONE ENCOUNTER
Patient informed and verbalized understanding. Patient is not interested in an ABUS at this time.  Patient states she drinks alcohol daily.  Denies any nausea or RUQ pain.  Sees Dr Dow  request for notes faxed to his office. Will get labs done at   in the next couple days. Transferred to  to schedule US

## 2024-04-08 ENCOUNTER — TELEPHONE (OUTPATIENT)
Dept: FAMILY MEDICINE CLINIC | Age: 64
End: 2024-04-08

## 2024-04-08 RX ORDER — FOLIC ACID 1 MG/1
1 TABLET ORAL DAILY
Qty: 90 TABLET | Refills: 4 | Status: SHIPPED | OUTPATIENT
Start: 2024-04-08

## 2024-04-08 NOTE — TELEPHONE ENCOUNTER
Patient informed and verbalized understanding. Patient will get lab work done, notes faxed to Paloma's office and they are aware patient needs an appointment

## 2024-04-08 NOTE — TELEPHONE ENCOUNTER
----- Message from Pablito Zapata, MELLY - CNP sent at 4/8/2024  7:34 AM EDT -----  Let pt know her folic acid level is just a little low at 4.6 normal levels are 4.8-24.2, her iron levels are a little high at 192 normal levels are up to 170, her iron levels being elevated.  could be due to what is going on with her liver enzymes being elevated.  I am going to start her on a folic acid supplement, (will send into her pharmacy) I also want to fax her recent lab results to her GI provider (Dr. Yuan) and also contact fr. Carrero's office and let know she needs an appt with them. Thanks     right lower arm

## 2024-04-09 ENCOUNTER — COMMUNITY OUTREACH (OUTPATIENT)
Dept: FAMILY MEDICINE CLINIC | Age: 64
End: 2024-04-09

## 2024-05-30 ENCOUNTER — OFFICE VISIT (OUTPATIENT)
Dept: FAMILY MEDICINE CLINIC | Age: 64
End: 2024-05-30
Payer: COMMERCIAL

## 2024-05-30 VITALS
BODY MASS INDEX: 21.67 KG/M2 | HEIGHT: 61 IN | HEART RATE: 74 BPM | OXYGEN SATURATION: 98 % | WEIGHT: 114.8 LBS | RESPIRATION RATE: 16 BRPM | SYSTOLIC BLOOD PRESSURE: 144 MMHG | DIASTOLIC BLOOD PRESSURE: 82 MMHG | TEMPERATURE: 97.5 F

## 2024-05-30 DIAGNOSIS — E78.5 HYPERLIPIDEMIA, UNSPECIFIED HYPERLIPIDEMIA TYPE: ICD-10-CM

## 2024-05-30 DIAGNOSIS — F51.04 PSYCHOPHYSIOLOGICAL INSOMNIA: ICD-10-CM

## 2024-05-30 DIAGNOSIS — F41.9 ANXIETY: ICD-10-CM

## 2024-05-30 DIAGNOSIS — F22 PARANOIA (HCC): ICD-10-CM

## 2024-05-30 DIAGNOSIS — Z91.148 DRUG NONCOMPLIANCE: ICD-10-CM

## 2024-05-30 DIAGNOSIS — F34.1 DYSTHYMIA: ICD-10-CM

## 2024-05-30 DIAGNOSIS — D53.9 MACROCYTIC ANEMIA: ICD-10-CM

## 2024-05-30 DIAGNOSIS — R74.01 TRANSAMINITIS: ICD-10-CM

## 2024-05-30 DIAGNOSIS — F10.10 ALCOHOL ABUSE: ICD-10-CM

## 2024-05-30 DIAGNOSIS — I10 PRIMARY HYPERTENSION: ICD-10-CM

## 2024-05-30 DIAGNOSIS — F31.62 BIPOLAR DISORDER, CURRENT EPISODE MIXED, MODERATE (HCC): Primary | ICD-10-CM

## 2024-05-30 PROCEDURE — 3077F SYST BP >= 140 MM HG: CPT | Performed by: NURSE PRACTITIONER

## 2024-05-30 PROCEDURE — 4004F PT TOBACCO SCREEN RCVD TLK: CPT | Performed by: NURSE PRACTITIONER

## 2024-05-30 PROCEDURE — 3079F DIAST BP 80-89 MM HG: CPT | Performed by: NURSE PRACTITIONER

## 2024-05-30 PROCEDURE — G8420 CALC BMI NORM PARAMETERS: HCPCS | Performed by: NURSE PRACTITIONER

## 2024-05-30 PROCEDURE — 3017F COLORECTAL CA SCREEN DOC REV: CPT | Performed by: NURSE PRACTITIONER

## 2024-05-30 PROCEDURE — 99215 OFFICE O/P EST HI 40 MIN: CPT | Performed by: NURSE PRACTITIONER

## 2024-05-30 PROCEDURE — G8427 DOCREV CUR MEDS BY ELIG CLIN: HCPCS | Performed by: NURSE PRACTITIONER

## 2024-05-30 RX ORDER — GAUZE BANDAGE 2" X 2"
BANDAGE TOPICAL
Qty: 90 TABLET | Refills: 4 | Status: SHIPPED | OUTPATIENT
Start: 2024-05-30

## 2024-05-30 RX ORDER — SIMVASTATIN 40 MG
40 TABLET ORAL NIGHTLY
Qty: 90 TABLET | Refills: 4 | Status: SHIPPED | OUTPATIENT
Start: 2024-05-30

## 2024-05-30 RX ORDER — LAMOTRIGINE 25 MG/1
TABLET ORAL
Qty: 360 TABLET | Refills: 4 | Status: SHIPPED | OUTPATIENT
Start: 2024-05-30

## 2024-05-30 RX ORDER — TRAZODONE HYDROCHLORIDE 50 MG/1
50 TABLET ORAL DAILY PRN
Qty: 90 TABLET | Refills: 4 | Status: SHIPPED | OUTPATIENT
Start: 2024-05-30

## 2024-05-30 RX ORDER — FOLIC ACID 1 MG/1
1 TABLET ORAL DAILY
Qty: 90 TABLET | Refills: 4 | Status: SHIPPED | OUTPATIENT
Start: 2024-05-30

## 2024-05-30 RX ORDER — SERTRALINE HYDROCHLORIDE 100 MG/1
TABLET, FILM COATED ORAL
Qty: 90 TABLET | Refills: 4 | Status: SHIPPED | OUTPATIENT
Start: 2024-05-30

## 2024-05-30 RX ORDER — LISINOPRIL AND HYDROCHLOROTHIAZIDE 12.5; 1 MG/1; MG/1
TABLET ORAL
Qty: 90 TABLET | Refills: 4 | Status: SHIPPED | OUTPATIENT
Start: 2024-05-30

## 2024-05-30 ASSESSMENT — ENCOUNTER SYMPTOMS
RESPIRATORY NEGATIVE: 1
GASTROINTESTINAL NEGATIVE: 1

## 2024-05-30 NOTE — PROGRESS NOTES
[facility]  [address]  [place] [address]  Dept: [phone]  Dept Fax: [phone]  Loc: [phone]    Santiago Wiseman is a 63 y.o. femalewho presents today for her medical conditions/complaints as noted below.  Santiago Wisemanis c/o of Follow-up (No concerns )      :     HPI    PMH: HTN, hyperlipidemia, DVT, bipolar GERD. Transaminitis, alcohol abuse Anxiety paranoia    Pt states she fell on her right knee in January last month, went to ER had an x-ray it was negative for a fracture but she developed a hematoma of the knee,  It continues to hurt and ache with activity sometimes it makes her right leg feel weal  Has not fallen.  Not taking any meds  Sometimes it aches at hs and keeps her up.        Stopped her lamictal and has not been sleeping her moods have been  all over the place  Has run out of other meds also  -doesn't know what meds to take  -son with her today   -advised to get a med box and set up for her so she knows what to take       HTN  -not controlled   -not taking prinzide, zocor 40 mg daily,   No symptoms , no CP or SOB on exertion or PE     Bipolar  -is to be taking zoloft 100 mg daily, lamictal 25 mg daily   Symptoms not controlled  Not able to sleep at hs   Moods all over the place     Hx macrocytic anemia   -is to be taking thiamine, folic acid and daily MVI , unclear if she is   Does continue to drink   Declines AAA      GERD  -controlled with she takes protonix 40 mg daily     Allergic rhinitis and nasal congestion controlled with claritin and flonase but she has run out.       Recently Dx with transaminitis  Drinks daily wine a bottle  day  Denies fatigue or jaundiced or N/V  Following up with GI       Last colonoscopy 2023  Last PAP 2021  Last mammogram 10/2023          Lab Results   Component Value Date    WBC 9.3 04/03/2024    HGB 14.7 04/03/2024    HCT 42.1 04/03/2024    MCV 97.5 04/03/2024     04/03/2024

## 2024-09-25 DIAGNOSIS — D53.9 MACROCYTIC ANEMIA: ICD-10-CM

## 2024-09-25 DIAGNOSIS — I10 PRIMARY HYPERTENSION: ICD-10-CM

## 2024-09-25 DIAGNOSIS — F31.62 BIPOLAR DISORDER, CURRENT EPISODE MIXED, MODERATE (HCC): ICD-10-CM

## 2024-09-25 DIAGNOSIS — J30.1 SEASONAL ALLERGIC RHINITIS DUE TO POLLEN: ICD-10-CM

## 2024-09-25 DIAGNOSIS — F34.1 DYSTHYMIA: ICD-10-CM

## 2024-09-25 DIAGNOSIS — K64.4 EXTERNAL HEMORRHOID: ICD-10-CM

## 2024-09-25 DIAGNOSIS — F51.04 PSYCHOPHYSIOLOGICAL INSOMNIA: ICD-10-CM

## 2024-09-25 DIAGNOSIS — R63.0 DECREASED APPETITE: ICD-10-CM

## 2024-09-25 DIAGNOSIS — E78.5 HYPERLIPIDEMIA, UNSPECIFIED HYPERLIPIDEMIA TYPE: ICD-10-CM

## 2024-09-25 DIAGNOSIS — J30.0 CHRONIC VASOMOTOR RHINITIS: ICD-10-CM

## 2024-09-25 DIAGNOSIS — F22 PARANOIA (HCC): ICD-10-CM

## 2024-09-25 RX ORDER — FOLIC ACID 1 MG/1
1 TABLET ORAL DAILY
Qty: 90 TABLET | Refills: 4 | Status: SHIPPED | OUTPATIENT
Start: 2024-09-25

## 2024-09-25 RX ORDER — SERTRALINE HYDROCHLORIDE 100 MG/1
TABLET, FILM COATED ORAL
Qty: 90 TABLET | Refills: 4 | Status: SHIPPED | OUTPATIENT
Start: 2024-09-25

## 2024-09-25 RX ORDER — SIMVASTATIN 40 MG
40 TABLET ORAL NIGHTLY
Qty: 90 TABLET | Refills: 4 | Status: SHIPPED | OUTPATIENT
Start: 2024-09-25

## 2024-09-25 RX ORDER — HYDROCORTISONE ACETATE 25 MG/1
25 SUPPOSITORY RECTAL EVERY 12 HOURS
Qty: 12 SUPPOSITORY | Refills: 2 | Status: SHIPPED | OUTPATIENT
Start: 2024-09-25

## 2024-09-25 RX ORDER — FLUTICASONE PROPIONATE 50 MCG
2 SPRAY, SUSPENSION (ML) NASAL DAILY
Qty: 1 EACH | Refills: 5 | Status: SHIPPED | OUTPATIENT
Start: 2024-09-25

## 2024-09-25 RX ORDER — LISINOPRIL/HYDROCHLOROTHIAZIDE 10-12.5 MG
TABLET ORAL
Qty: 90 TABLET | Refills: 4 | Status: SHIPPED | OUTPATIENT
Start: 2024-09-25

## 2024-09-25 RX ORDER — LORATADINE 10 MG/1
10 TABLET ORAL DAILY
Qty: 30 TABLET | Refills: 11 | Status: SHIPPED | OUTPATIENT
Start: 2024-09-25

## 2024-09-25 RX ORDER — TRAZODONE HYDROCHLORIDE 50 MG/1
50 TABLET, FILM COATED ORAL DAILY PRN
Qty: 90 TABLET | Refills: 4 | Status: SHIPPED | OUTPATIENT
Start: 2024-09-25

## 2024-09-25 RX ORDER — LAMOTRIGINE 25 MG/1
TABLET ORAL
Qty: 360 TABLET | Refills: 4 | Status: SHIPPED | OUTPATIENT
Start: 2024-09-25

## 2024-09-25 RX ORDER — MEGESTROL ACETATE 20 MG/1
TABLET ORAL
Qty: 90 TABLET | Refills: 3 | Status: SHIPPED | OUTPATIENT
Start: 2024-09-25

## 2024-09-25 RX ORDER — GAUZE BANDAGE 2" X 2"
BANDAGE TOPICAL
Qty: 90 TABLET | Refills: 4 | Status: SHIPPED | OUTPATIENT
Start: 2024-09-25

## 2024-11-12 ENCOUNTER — OFFICE VISIT (OUTPATIENT)
Dept: FAMILY MEDICINE CLINIC | Age: 64
End: 2024-11-12

## 2024-11-12 ENCOUNTER — TELEPHONE (OUTPATIENT)
Dept: FAMILY MEDICINE CLINIC | Age: 64
End: 2024-11-12

## 2024-11-12 VITALS
WEIGHT: 109 LBS | HEART RATE: 92 BPM | OXYGEN SATURATION: 98 % | SYSTOLIC BLOOD PRESSURE: 142 MMHG | TEMPERATURE: 97.4 F | RESPIRATION RATE: 16 BRPM | DIASTOLIC BLOOD PRESSURE: 84 MMHG | BODY MASS INDEX: 20.58 KG/M2 | HEIGHT: 61 IN

## 2024-11-12 DIAGNOSIS — R55 SYNCOPE AND COLLAPSE: Primary | ICD-10-CM

## 2024-11-12 DIAGNOSIS — Z23 NEEDS FLU SHOT: ICD-10-CM

## 2024-11-12 DIAGNOSIS — R42 DIZZINESS: ICD-10-CM

## 2024-11-12 DIAGNOSIS — F10.10 ALCOHOL ABUSE: ICD-10-CM

## 2024-11-12 DIAGNOSIS — R56.9 SEIZURE-LIKE ACTIVITY (HCC): ICD-10-CM

## 2024-11-12 DIAGNOSIS — R01.1 MURMUR, CARDIAC: ICD-10-CM

## 2024-11-12 RX ORDER — CEPHALEXIN 500 MG/1
CAPSULE ORAL 2 TIMES DAILY
COMMUNITY
Start: 2024-11-10 | End: 2024-11-17

## 2024-11-12 NOTE — PROGRESS NOTES
Chief Complaint   Patient presents with    ED Follow-up     Passing out/dizziness     History obtained from the patient.    SUBJECTIVE:  Santiago Wiseman is a 63 y.o. female that presents today for    ER f/u:  Difficult historian  In [facility] ER on 11/10/24 for syncope and dizziness  Had labs done that were WNL other than elevated alcohol level  EKG that was WNL  CT head that was WNL  CXR that was WNL  Was given an MCOT, but no cardio f/u that she is aware of and d/c home    Pt relates had been passing out for months  Will get dizzy and \"fall out\"  Not able to get much more hx beyond that  She states they've been witnessed and told she shakes  She at times wakes confused  No loss of bowel/bladder control.   Last syncopal episode was yesterday  She has quit driving    Denies HA, vision changes or stroke like sxs.   Pt does drink at least a 1/2 bottle of wine daily, maybe more  Has done this for years.         Current Outpatient Medications   Medication Sig Dispense Refill    cephALEXin (KEFLEX) 500 MG capsule Take by mouth 2 times daily      folic acid (FOLVITE) 1 MG tablet Take 1 tablet by mouth daily (Patient not taking: Reported on 11/12/2024) 90 tablet 4    lamoTRIgine (LAMICTAL) 25 MG tablet TAKE 2 TABLETS BY MOUTH TWICE A DAY (Patient not taking: Reported on 11/12/2024) 360 tablet 4    lisinopril-hydroCHLOROthiazide (PRINZIDE;ZESTORETIC) 10-12.5 MG per tablet 1 tab po daily (Patient not taking: Reported on 11/12/2024) 90 tablet 4    sertraline (ZOLOFT) 100 MG tablet 1 tab po daily (Patient not taking: Reported on 11/12/2024) 90 tablet 4    simvastatin (ZOCOR) 40 MG tablet Take 1 tablet by mouth nightly (Patient not taking: Reported on 11/12/2024) 90 tablet 4    thiamine mononitrate 100 MG tablet TAKE 1 TABLET BY MOUTH EVERY DAY (Patient not taking: Reported on 11/12/2024) 90 tablet 4    traZODone (DESYREL) 50 MG tablet Take 1 tablet by mouth daily as needed for Sleep (Patient not taking: Reported on 11/12/2024)

## 2024-11-12 NOTE — TELEPHONE ENCOUNTER
Pt called and is asking for an appt for today. She was recently in the ER for some dizziness and chest pain, was put on a MCO2 monitor. She is not currently having any chest pain or dizziness, this is for a follow up.

## 2024-11-12 NOTE — PROGRESS NOTES
Vaccine Information Sheet, \"Influenza - Inactivated\"  given to Santiago Wiseman, or parent/legal guardian of  Santiago Wiseman and verbalized understanding.    Patient responses:    Have you ever had a reaction to a flu vaccine? No  Do you have an allergy to eggs, neomycin or polymixin?  No  Do you have an allergy to Thimerosal, contact lens solution, or Merthiolate? No  Have you ever had Guillian Cameron Syndrome?  No  Do you have any current illness?  No  Do you have a temperature above 100 degrees? No  Are you pregnant? No  If pregnant, permission obtained from physician? No  Do you have an active neurological disorder? No      Flu vaccine given per order. Please see immunization tab.  Immunization(s) given during visit:    Immunizations Administered       Name Date Dose Route    Influenza, FLUCELVAX, (age 6 mo+) IM, Trivalent PF, 0.5mL 11/12/2024 0.5 mL Intramuscular    Site: Deltoid- Left    Lot: [id number]    NDC: 94448-704-05            Most recent Vaccine Information Sheet dated 8.6.2021 given to pt

## 2024-11-26 ENCOUNTER — OFFICE VISIT (OUTPATIENT)
Dept: FAMILY MEDICINE CLINIC | Age: 64
End: 2024-11-26
Payer: MEDICAID

## 2024-11-26 VITALS
BODY MASS INDEX: 20.32 KG/M2 | TEMPERATURE: 97.3 F | HEART RATE: 84 BPM | RESPIRATION RATE: 16 BRPM | SYSTOLIC BLOOD PRESSURE: 138 MMHG | OXYGEN SATURATION: 98 % | DIASTOLIC BLOOD PRESSURE: 82 MMHG | HEIGHT: 61 IN | WEIGHT: 107.6 LBS

## 2024-11-26 DIAGNOSIS — G25.0 BENIGN HEAD TREMOR: ICD-10-CM

## 2024-11-26 DIAGNOSIS — R55 SYNCOPE AND COLLAPSE: Primary | ICD-10-CM

## 2024-11-26 DIAGNOSIS — F10.10 ALCOHOL ABUSE: ICD-10-CM

## 2024-11-26 DIAGNOSIS — I10 PRIMARY HYPERTENSION: ICD-10-CM

## 2024-11-26 DIAGNOSIS — R27.8 ABNORMAL COORDINATION: ICD-10-CM

## 2024-11-26 DIAGNOSIS — M62.81 MUSCLE WEAKNESS: ICD-10-CM

## 2024-11-26 PROCEDURE — 99214 OFFICE O/P EST MOD 30 MIN: CPT | Performed by: NURSE PRACTITIONER

## 2024-11-26 PROCEDURE — 3075F SYST BP GE 130 - 139MM HG: CPT | Performed by: NURSE PRACTITIONER

## 2024-11-26 PROCEDURE — 3079F DIAST BP 80-89 MM HG: CPT | Performed by: NURSE PRACTITIONER

## 2024-11-26 ASSESSMENT — ENCOUNTER SYMPTOMS
RESPIRATORY NEGATIVE: 1
GASTROINTESTINAL NEGATIVE: 1

## 2024-11-26 NOTE — PROGRESS NOTES
[facility] PROFESSIONAL [facility] FAMILY MEDICINE  [address]  [place] [address]  Dept: [phone]  Dept Fax: [phone]  Loc: [phone]    Segundo Alcantar is a 63 y.o. femalewho presents today for her medical conditions/complaints as noted below.  Segundo Alcantaris c/o of Follow-up (Wants lab work, thinks iron is low. )      :     HPI    ER f/u:  Difficult historian  In [facility] ER on 11/10/24 for syncope and dizziness  Had labs done that were WNL other than elevated alcohol level  EKG that was WNL  CT head that was WNL  CXR that was WNL  Was given an MCOT, does not have  [facility] cardiology f/u yet     Pt relates had been passing out for months  Will get dizzy and \"fall out\"  Not able to get much more hx beyond that  She states they've been witnessed and told she shakes  She at times wakes confused  No loss of bowel/bladder control.   Last syncopal episode was yesterday  She has quit driving    Has decreased her  drinking and smoking     Denies HA, vision changes or stroke like sxs.   Pt does drink at least a 1/2 bottle of wine daily, maybe more  Has done this for years.       SO with her today states her head has been shaking, seems to be weak     Echo , EEG, carotid u/s ordered and scheduled for tomorrow     HTN  -taking prinzide 10/12,5 daily,  Denies CP or SOB or PE    Appetite normal     Last colonoscopy 10/2023  Last mammogram 2024  CT HEAD WO CONTRAST  Order: [id number]  Status: Final result       Visible to patient: No (not released)       Next appt: 2024 at 01:00 PM in EEG (STR NEURODIAG ROOM 1)    0 Result Notes  Details    Narrative  Ordering Provider: Kelsey Balderas [facility]        Radiology Department  Patient:  SEGUNDO ALCANTAR    [address]  :  1960   Sex:  Emily Santana [id number]  Location:  [phone]   Unit #:   [id number]      Acct #:  [id number]      Ordering Phys: Kelsey Zapata PA-C          Exam Date:

## 2024-11-27 ENCOUNTER — HOSPITAL ENCOUNTER (OUTPATIENT)
Dept: INTERVENTIONAL RADIOLOGY/VASCULAR | Age: 64
Discharge: HOME | End: 2024-11-29
Attending: FAMILY MEDICINE
Payer: MEDICAID

## 2024-11-27 ENCOUNTER — HOSPITAL ENCOUNTER (OUTPATIENT)
Age: 64
Discharge: HOME | End: 2024-11-29
Attending: FAMILY MEDICINE
Payer: MEDICAID

## 2024-11-27 DIAGNOSIS — R55 SYNCOPE AND COLLAPSE: ICD-10-CM

## 2024-11-27 DIAGNOSIS — R42 DIZZINESS: ICD-10-CM

## 2024-11-27 DIAGNOSIS — R56.9 SEIZURE-LIKE ACTIVITY (HCC): ICD-10-CM

## 2024-11-27 DIAGNOSIS — R01.1 MURMUR, CARDIAC: ICD-10-CM

## 2024-11-27 PROCEDURE — 93306 TTE W/DOPPLER COMPLETE: CPT

## 2024-11-27 PROCEDURE — 93880 EXTRACRANIAL BILAT STUDY: CPT

## 2024-11-28 LAB
ECHO AO ASC DIAM: 2.8 CM
ECHO AV CUSP MM: 1.7 CM
ECHO AV MEAN GRADIENT: 3 MMHG
ECHO AV MEAN VELOCITY: 0.7 M/S
ECHO AV PEAK GRADIENT: 4 MMHG
ECHO AV PEAK VELOCITY: 1.1 M/S
ECHO AV VELOCITY RATIO: 0.73
ECHO AV VTI: 20.7 CM
ECHO EST RA PRESSURE: 3 MMHG
ECHO LA AREA 2C: 9.9 CM2
ECHO LA AREA 4C: 11.1 CM2
ECHO LA DIAMETER: 2.4 CM
ECHO LA MAJOR AXIS: 4.3 CM
ECHO LA MINOR AXIS: 4 CM
ECHO LA VOL BP: 22 ML (ref 22–52)
ECHO LA VOL MOD A2C: 20 ML (ref 22–52)
ECHO LA VOL MOD A4C: 24 ML (ref 22–52)
ECHO LV E' LATERAL VELOCITY: 6.7 CM/S
ECHO LV E' SEPTAL VELOCITY: 4.5 CM/S
ECHO LV EDV A2C: 45 ML
ECHO LV EDV A4C: 47 ML
ECHO LV EJECTION FRACTION A2C: 59 %
ECHO LV EJECTION FRACTION A4C: 54 %
ECHO LV EJECTION FRACTION BIPLANE: 58 % (ref 55–100)
ECHO LV ESV A2C: 19 ML
ECHO LV ESV A4C: 21 ML
ECHO LV FRACTIONAL SHORTENING: 29 % (ref 28–44)
ECHO LV INTERNAL DIMENSION DIASTOLIC: 3.5 CM (ref 3.9–5.3)
ECHO LV INTERNAL DIMENSION SYSTOLIC: 2.5 CM
ECHO LV ISOVOLUMETRIC RELAXATION TIME (IVRT): 42 MS
ECHO LV IVSD: 0.8 CM (ref 0.6–0.9)
ECHO LV MASS 2D: 75.3 G (ref 67–162)
ECHO LV POSTERIOR WALL DIASTOLIC: 0.8 CM (ref 0.6–0.9)
ECHO LV RELATIVE WALL THICKNESS RATIO: 0.46
ECHO LVOT AV VTI INDEX: 0.85
ECHO LVOT MEAN GRADIENT: 1 MMHG
ECHO LVOT PEAK GRADIENT: 2 MMHG
ECHO LVOT PEAK VELOCITY: 0.8 M/S
ECHO LVOT VTI: 17.5 CM
ECHO MV A VELOCITY: 0.69 M/S
ECHO MV E DECELERATION TIME (DT): 216 MS
ECHO MV E VELOCITY: 0.57 M/S
ECHO MV E/A RATIO: 0.83
ECHO MV E/E' LATERAL: 8.51
ECHO MV E/E' RATIO (AVERAGED): 10.59
ECHO MV E/E' SEPTAL: 12.67
ECHO MV REGURGITANT PEAK GRADIENT: 81 MMHG
ECHO MV REGURGITANT PEAK VELOCITY: 4.5 M/S
ECHO PV MAX VELOCITY: 0.8 M/S
ECHO PV PEAK GRADIENT: 3 MMHG
ECHO RIGHT VENTRICULAR SYSTOLIC PRESSURE (RVSP): 30 MMHG
ECHO RV INTERNAL DIMENSION: 3 CM
ECHO RV TAPSE: 1.5 CM (ref 1.7–?)
ECHO TV E WAVE: 0.5 M/S
ECHO TV REGURGITANT MAX VELOCITY: 2.59 M/S
ECHO TV REGURGITANT PEAK GRADIENT: 27 MMHG

## 2024-11-29 ENCOUNTER — TELEPHONE (OUTPATIENT)
Dept: FAMILY MEDICINE CLINIC | Age: 64
End: 2024-11-29

## 2024-11-29 NOTE — TELEPHONE ENCOUNTER
Pt informed of labs and will continue the plan from office. . Pt voiced understanding with no further questions at this time.

## 2024-11-29 NOTE — TELEPHONE ENCOUNTER
----- Message from Dr. Raheem Barboza, DO sent at 11/28/2024  7:54 AM EST -----  Please let pt know that echocardiogram looks good. No concerning findings and nothing to explain her symptoms.   Also, her carotid US shows some plaque buildup but no significant blockage, and again, nothing to explain her symptoms.   Will call with EEG results once available.   Con't with plan from office.   Let me know if questions, thanks!

## 2025-04-28 ENCOUNTER — HOSPITAL ENCOUNTER (EMERGENCY)
Age: 65
Discharge: HOME | End: 2025-04-28
Attending: FAMILY MEDICINE
Payer: MEDICAID

## 2025-04-28 ENCOUNTER — APPOINTMENT (OUTPATIENT)
Dept: GENERAL RADIOLOGY | Age: 65
End: 2025-04-28
Payer: MEDICAID

## 2025-04-28 VITALS
BODY MASS INDEX: 21.54 KG/M2 | SYSTOLIC BLOOD PRESSURE: 156 MMHG | OXYGEN SATURATION: 98 % | DIASTOLIC BLOOD PRESSURE: 91 MMHG | RESPIRATION RATE: 20 BRPM | WEIGHT: 114 LBS | TEMPERATURE: 97.8 F | HEART RATE: 76 BPM

## 2025-04-28 DIAGNOSIS — E86.0 DEHYDRATION: Primary | ICD-10-CM

## 2025-04-28 DIAGNOSIS — R42 LIGHTHEADEDNESS: ICD-10-CM

## 2025-04-28 LAB
ALBUMIN SERPL BCG-MCNC: 3.1 G/DL (ref 3.4–4.9)
ALP SERPL-CCNC: 173 U/L (ref 38–126)
ALT SERPL W/O P-5'-P-CCNC: 66 U/L (ref 10–35)
ANION GAP SERPL CALC-SCNC: 17 MEQ/L (ref 8–16)
AST SERPL-CCNC: 279 U/L (ref 10–35)
BASOPHILS ABSOLUTE: 0 THOU/MM3 (ref 0–0.1)
BASOPHILS NFR BLD AUTO: 0.5 %
BILIRUB SERPL-MCNC: 0.9 MG/DL (ref 0.3–1.2)
BUN SERPL-MCNC: 3 MG/DL (ref 8–23)
CALCIUM SERPL-MCNC: 8.4 MG/DL (ref 8.8–10.2)
CHLORIDE SERPL-SCNC: 100 MEQ/L (ref 98–111)
CO2 SERPL-SCNC: 21 MEQ/L (ref 22–29)
CREAT SERPL-MCNC: 0.5 MG/DL (ref 0.5–0.9)
DEPRECATED RDW RBC AUTO: 47.8 FL (ref 35–45)
EKG ATRIAL RATE: 227 BPM
EKG P AXIS: 41 DEGREES
EKG P-R INTERVAL: 138 MS
EKG Q-T INTERVAL: 394 MS
EKG QRS DURATION: 72 MS
EKG QTC CALCULATION (BAZETT): 428 MS
EKG R AXIS: 37 DEGREES
EKG T AXIS: 1 DEGREES
EKG VENTRICULAR RATE: 71 BPM
EOSINOPHIL NFR BLD AUTO: 2.3 %
EOSINOPHILS ABSOLUTE: 0.2 THOU/MM3 (ref 0–0.4)
ERYTHROCYTE [DISTWIDTH] IN BLOOD BY AUTOMATED COUNT: 12.9 % (ref 11.5–14.5)
ETHANOL SERPL-MCNC: 0.21 % (ref 0–0.08)
GFR SERPL CREATININE-BSD FRML MDRD: > 90 ML/MIN/1.73M2
GLUCOSE SERPL-MCNC: 104 MG/DL (ref 74–109)
HCT VFR BLD AUTO: 41.6 % (ref 37–47)
HGB BLD-MCNC: 14.3 GM/DL (ref 12–16)
IMM GRANULOCYTES # BLD AUTO: 0.02 THOU/MM3 (ref 0–0.07)
IMM GRANULOCYTES NFR BLD AUTO: 0.3 %
LYMPHOCYTES ABSOLUTE: 2.3 THOU/MM3 (ref 1–4.8)
LYMPHOCYTES NFR BLD AUTO: 29.4 %
MCH RBC QN AUTO: 34.6 PG (ref 26–33)
MCHC RBC AUTO-ENTMCNC: 34.4 GM/DL (ref 32.2–35.5)
MCV RBC AUTO: 100.7 FL (ref 81–99)
MONOCYTES ABSOLUTE: 1.1 THOU/MM3 (ref 0.4–1.3)
MONOCYTES NFR BLD AUTO: 14.8 %
NEUTROPHILS ABSOLUTE: 4.1 THOU/MM3 (ref 1.8–7.7)
NEUTROPHILS NFR BLD AUTO: 52.7 %
NRBC BLD AUTO-RTO: 0 /100 WBC
OSMOLALITY SERPL CALC.SUM OF ELEC: 272.5 MOSMOL/KG (ref 275–300)
PLATELET # BLD AUTO: 168 THOU/MM3 (ref 130–400)
PMV BLD AUTO: 10.3 FL (ref 9.4–12.4)
POTASSIUM SERPL-SCNC: 3.6 MEQ/L (ref 3.5–5.2)
PROT SERPL-MCNC: 6.9 G/DL (ref 6.4–8.3)
RBC # BLD AUTO: 4.13 MILL/MM3 (ref 4.2–5.4)
SODIUM SERPL-SCNC: 138 MEQ/L (ref 135–145)
TROPONIN, HIGH SENSITIVITY: < 6 NG/L (ref 0–12)
WBC # BLD AUTO: 7.7 THOU/MM3 (ref 4.8–10.8)

## 2025-04-28 PROCEDURE — 36415 COLL VENOUS BLD VENIPUNCTURE: CPT

## 2025-04-28 PROCEDURE — 84484 ASSAY OF TROPONIN QUANT: CPT

## 2025-04-28 PROCEDURE — 93010 ELECTROCARDIOGRAM REPORT: CPT | Performed by: NUCLEAR MEDICINE

## 2025-04-28 PROCEDURE — 80053 COMPREHEN METABOLIC PANEL: CPT

## 2025-04-28 PROCEDURE — 2580000003 HC RX 258

## 2025-04-28 PROCEDURE — 82077 ASSAY SPEC XCP UR&BREATH IA: CPT

## 2025-04-28 PROCEDURE — 93005 ELECTROCARDIOGRAM TRACING: CPT

## 2025-04-28 PROCEDURE — 99285 EMERGENCY DEPT VISIT HI MDM: CPT

## 2025-04-28 PROCEDURE — 85025 COMPLETE CBC W/AUTO DIFF WBC: CPT

## 2025-04-28 PROCEDURE — 71046 X-RAY EXAM CHEST 2 VIEWS: CPT

## 2025-04-28 PROCEDURE — 96360 HYDRATION IV INFUSION INIT: CPT

## 2025-04-28 PROCEDURE — 96361 HYDRATE IV INFUSION ADD-ON: CPT

## 2025-04-28 RX ORDER — 0.9 % SODIUM CHLORIDE 0.9 %
1000 INTRAVENOUS SOLUTION INTRAVENOUS ONCE
Status: COMPLETED | OUTPATIENT
Start: 2025-04-28 | End: 2025-04-28

## 2025-04-28 RX ADMIN — SODIUM CHLORIDE 1000 ML: 0.9 INJECTION, SOLUTION INTRAVENOUS at 17:02

## 2025-04-28 ASSESSMENT — ENCOUNTER SYMPTOMS
COLOR CHANGE: 0
BLOOD IN STOOL: 1
SHORTNESS OF BREATH: 0

## 2025-04-28 NOTE — ED PROVIDER NOTES
[facility] EMERGENCY DEPARTMENT  EMERGENCY DEPARTMENT ENCOUNTER          Pt Name: Santiago Wiseman  MRN: [id number]  Birthdate 1960  Date of evaluation: 4/28/2025  Physician: Gillian Santos DO  Supervising Attending Physician: Hardik Francisco MD       CHIEF COMPLAINT       Chief Complaint   Patient presents with    Dizziness         HISTORY OF PRESENT ILLNESS    HPI  Santiago Wiseman is a 64 y.o. female with PMHx of  who presents to the emergency department from home, by private vehicle for evaluation of dizziness and syncopal episode.     Patient was in her kitchen yelling at her dog when she felt lightheaded while standing.  She leaned against the wall and slowly later self down to the floor.  She is unsure if she lost consciousness, however does not think that she hit her head.  She states she had nothing to eat today but did drink a glass of wine this morning.     Patient states that she has had these dizzy episodes for several weeks and has been seen by her PCP who ordered CT head which was normal and carotid ultrasound which showed some plaque but without significant obstruction; she says she was supposed to have an EEG however missed the appointment and had to reschedule it.     She says she also has seen some blood in her stools.  She has had problems with hemorrhoids in the past for which she has used suppositories with success.  She denies any blood in her urine but states that it does look darker.  Her friend is in the room and says that she has not been eating much.  She is on Megace for decreased appetite.  She did not take any of her home medications this morning including her antihypertensives    The patient has no other acute complaints at this time.      REVIEW OF SYSTEMS   Review of Systems   Respiratory:  Negative for shortness of breath.    Gastrointestinal:  Positive for blood in stool.   Skin:  Negative for color change and rash.   Neurological:  Positive for

## 2025-04-28 NOTE — ED TRIAGE NOTES
Pt presents to the ED with complaints of dizziness. Pt reports she had a syncopal episode going into the kitchen to get her dog. Pt reports she was \"out for a minute\". Pt reports she had a glass of wine  before the syncopal episode. Pt reports she is having small amount of red spots in her underwear that is red and thinks its coming from her rectum.

## 2025-07-08 ENCOUNTER — APPOINTMENT (OUTPATIENT)
Dept: CT IMAGING | Age: 65
DRG: 263 | End: 2025-07-08
Payer: MEDICAID

## 2025-07-08 ENCOUNTER — APPOINTMENT (OUTPATIENT)
Dept: GENERAL RADIOLOGY | Age: 65
DRG: 263 | End: 2025-07-08
Payer: MEDICAID

## 2025-07-08 ENCOUNTER — HOSPITAL ENCOUNTER (INPATIENT)
Age: 65
LOS: 4 days | Discharge: HOME | DRG: 263 | End: 2025-07-12
Admitting: STUDENT IN AN ORGANIZED HEALTH CARE EDUCATION/TRAINING PROGRAM
Payer: MEDICAID

## 2025-07-08 DIAGNOSIS — K81.0 ACUTE CHOLECYSTITIS: Primary | ICD-10-CM

## 2025-07-08 DIAGNOSIS — Z90.49 S/P LAPAROSCOPIC CHOLECYSTECTOMY: ICD-10-CM

## 2025-07-08 PROBLEM — E80.6 HYPERBILIRUBINEMIA: Status: ACTIVE | Noted: 2025-07-08

## 2025-07-08 LAB
ALBUMIN SERPL BCG-MCNC: 3 G/DL (ref 3.4–4.9)
ALP SERPL-CCNC: 132 U/L (ref 38–126)
ALT SERPL W/O P-5'-P-CCNC: 35 U/L (ref 10–35)
ANION GAP SERPL CALC-SCNC: 16 MEQ/L (ref 8–16)
AST SERPL-CCNC: 78 U/L (ref 10–35)
BASOPHILS ABSOLUTE: 0.1 THOU/MM3 (ref 0–0.1)
BASOPHILS NFR BLD AUTO: 0.5 %
BILIRUB CONJ SERPL-MCNC: 1 MG/DL (ref 0–0.2)
BILIRUB SERPL-MCNC: 1.9 MG/DL (ref 0.3–1.2)
BUN SERPL-MCNC: 7 MG/DL (ref 8–23)
CALCIUM SERPL-MCNC: 8.5 MG/DL (ref 8.8–10.2)
CHLORIDE SERPL-SCNC: 98 MEQ/L (ref 98–111)
CO2 SERPL-SCNC: 21 MEQ/L (ref 22–29)
CREAT SERPL-MCNC: 0.6 MG/DL (ref 0.5–0.9)
DEPRECATED RDW RBC AUTO: 45.2 FL (ref 35–45)
EKG ATRIAL RATE: 72 BPM
EKG P AXIS: 37 DEGREES
EKG P-R INTERVAL: 140 MS
EKG Q-T INTERVAL: 368 MS
EKG QRS DURATION: 64 MS
EKG QTC CALCULATION (BAZETT): 402 MS
EKG R AXIS: 55 DEGREES
EKG T AXIS: 27 DEGREES
EKG VENTRICULAR RATE: 72 BPM
EOSINOPHIL NFR BLD AUTO: 0.9 %
EOSINOPHILS ABSOLUTE: 0.1 THOU/MM3 (ref 0–0.4)
ERYTHROCYTE [DISTWIDTH] IN BLOOD BY AUTOMATED COUNT: 12.5 % (ref 11.5–14.5)
GFR SERPL CREATININE-BSD FRML MDRD: > 90 ML/MIN/1.73M2
GLUCOSE SERPL-MCNC: 96 MG/DL (ref 74–109)
HCT VFR BLD AUTO: 47.7 % (ref 37–47)
HGB BLD-MCNC: 16.8 GM/DL (ref 12–16)
IMM GRANULOCYTES # BLD AUTO: 0.03 THOU/MM3 (ref 0–0.07)
IMM GRANULOCYTES NFR BLD AUTO: 0.3 %
LACTATE SERPL-SCNC: 1.6 MMOL/L (ref 0.5–2.2)
LIPASE SERPL-CCNC: 30 U/L (ref 13–60)
LYMPHOCYTES ABSOLUTE: 1.9 THOU/MM3 (ref 1–4.8)
LYMPHOCYTES NFR BLD AUTO: 15.9 %
MAGNESIUM SERPL-MCNC: 1.7 MG/DL (ref 1.6–2.6)
MCH RBC QN AUTO: 34.8 PG (ref 26–33)
MCHC RBC AUTO-ENTMCNC: 35.2 GM/DL (ref 32.2–35.5)
MCV RBC AUTO: 98.8 FL (ref 81–99)
MONOCYTES ABSOLUTE: 1.3 THOU/MM3 (ref 0.4–1.3)
MONOCYTES NFR BLD AUTO: 11.4 %
NEUTROPHILS ABSOLUTE: 8.3 THOU/MM3 (ref 1.8–7.7)
NEUTROPHILS NFR BLD AUTO: 71 %
NRBC BLD AUTO-RTO: 0 /100 WBC
OSMOLALITY SERPL CALC.SUM OF ELEC: 267.9 MOSMOL/KG (ref 275–300)
PLATELET # BLD AUTO: 226 THOU/MM3 (ref 130–400)
PMV BLD AUTO: 11.4 FL (ref 9.4–12.4)
POTASSIUM SERPL-SCNC: 3.6 MEQ/L (ref 3.5–5.2)
PROT SERPL-MCNC: 6.6 G/DL (ref 6.4–8.3)
RBC # BLD AUTO: 4.83 MILL/MM3 (ref 4.2–5.4)
REASON FOR REJECTION: NORMAL
REASON FOR REJECTION: NORMAL
REJECTED TEST: NORMAL
REJECTED TEST: NORMAL
SODIUM SERPL-SCNC: 135 MEQ/L (ref 135–145)
TROPONIN, HIGH SENSITIVITY: 6 NG/L (ref 0–12)
WBC # BLD AUTO: 11.7 THOU/MM3 (ref 4.8–10.8)

## 2025-07-08 PROCEDURE — 2580000003 HC RX 258: Performed by: PHYSICIAN ASSISTANT

## 2025-07-08 PROCEDURE — 80053 COMPREHEN METABOLIC PANEL: CPT

## 2025-07-08 PROCEDURE — 84484 ASSAY OF TROPONIN QUANT: CPT

## 2025-07-08 PROCEDURE — 82248 BILIRUBIN DIRECT: CPT

## 2025-07-08 PROCEDURE — 6360000002 HC RX W HCPCS

## 2025-07-08 PROCEDURE — 85025 COMPLETE CBC W/AUTO DIFF WBC: CPT

## 2025-07-08 PROCEDURE — 2580000003 HC RX 258

## 2025-07-08 PROCEDURE — 83690 ASSAY OF LIPASE: CPT

## 2025-07-08 PROCEDURE — 83735 ASSAY OF MAGNESIUM: CPT

## 2025-07-08 PROCEDURE — 96375 TX/PRO/DX INJ NEW DRUG ADDON: CPT

## 2025-07-08 PROCEDURE — 6360000004 HC RX CONTRAST MEDICATION: Performed by: PHYSICIAN ASSISTANT

## 2025-07-08 PROCEDURE — 71046 X-RAY EXAM CHEST 2 VIEWS: CPT

## 2025-07-08 PROCEDURE — 99223 1ST HOSP IP/OBS HIGH 75: CPT

## 2025-07-08 PROCEDURE — 1200000000 HC SEMI PRIVATE

## 2025-07-08 PROCEDURE — 74177 CT ABD & PELVIS W/CONTRAST: CPT

## 2025-07-08 PROCEDURE — 93005 ELECTROCARDIOGRAM TRACING: CPT | Performed by: PHYSICIAN ASSISTANT

## 2025-07-08 PROCEDURE — 96374 THER/PROPH/DIAG INJ IV PUSH: CPT

## 2025-07-08 PROCEDURE — 87040 BLOOD CULTURE FOR BACTERIA: CPT

## 2025-07-08 PROCEDURE — 2500000003 HC RX 250 WO HCPCS

## 2025-07-08 PROCEDURE — 99285 EMERGENCY DEPT VISIT HI MDM: CPT

## 2025-07-08 PROCEDURE — 83605 ASSAY OF LACTIC ACID: CPT

## 2025-07-08 PROCEDURE — 6360000002 HC RX W HCPCS: Performed by: PHYSICIAN ASSISTANT

## 2025-07-08 RX ORDER — SODIUM CHLORIDE 9 MG/ML
INJECTION, SOLUTION INTRAVENOUS PRN
Status: DISCONTINUED | OUTPATIENT
Start: 2025-07-08 | End: 2025-07-12 | Stop reason: HOSPADM

## 2025-07-08 RX ORDER — POTASSIUM CHLORIDE 7.45 MG/ML
10 INJECTION INTRAVENOUS PRN
Status: DISCONTINUED | OUTPATIENT
Start: 2025-07-08 | End: 2025-07-12 | Stop reason: HOSPADM

## 2025-07-08 RX ORDER — DROPERIDOL 2.5 MG/ML
1.25 INJECTION, SOLUTION INTRAMUSCULAR; INTRAVENOUS ONCE
Status: COMPLETED | OUTPATIENT
Start: 2025-07-08 | End: 2025-07-08

## 2025-07-08 RX ORDER — ACETAMINOPHEN 650 MG/1
650 SUPPOSITORY RECTAL EVERY 6 HOURS PRN
Status: DISCONTINUED | OUTPATIENT
Start: 2025-07-08 | End: 2025-07-12 | Stop reason: HOSPADM

## 2025-07-08 RX ORDER — ONDANSETRON 4 MG/1
4 TABLET, ORALLY DISINTEGRATING ORAL EVERY 8 HOURS PRN
Status: DISCONTINUED | OUTPATIENT
Start: 2025-07-08 | End: 2025-07-10 | Stop reason: SDUPTHER

## 2025-07-08 RX ORDER — MORPHINE SULFATE 4 MG/ML
4 INJECTION, SOLUTION INTRAMUSCULAR; INTRAVENOUS
Status: DISCONTINUED | OUTPATIENT
Start: 2025-07-08 | End: 2025-07-11

## 2025-07-08 RX ORDER — SODIUM CHLORIDE 9 MG/ML
INJECTION, SOLUTION INTRAVENOUS CONTINUOUS
Status: DISCONTINUED | OUTPATIENT
Start: 2025-07-08 | End: 2025-07-09

## 2025-07-08 RX ORDER — HEPARIN SODIUM 5000 [USP'U]/ML
5000 INJECTION, SOLUTION INTRAVENOUS; SUBCUTANEOUS EVERY 8 HOURS SCHEDULED
Status: DISCONTINUED | OUTPATIENT
Start: 2025-07-08 | End: 2025-07-12 | Stop reason: HOSPADM

## 2025-07-08 RX ORDER — MORPHINE SULFATE 4 MG/ML
4 INJECTION, SOLUTION INTRAMUSCULAR; INTRAVENOUS ONCE
Status: COMPLETED | OUTPATIENT
Start: 2025-07-08 | End: 2025-07-08

## 2025-07-08 RX ORDER — POTASSIUM CHLORIDE 1500 MG/1
40 TABLET, EXTENDED RELEASE ORAL PRN
Status: DISCONTINUED | OUTPATIENT
Start: 2025-07-08 | End: 2025-07-12 | Stop reason: HOSPADM

## 2025-07-08 RX ORDER — IOPAMIDOL 755 MG/ML
80 INJECTION, SOLUTION INTRAVASCULAR
Status: COMPLETED | OUTPATIENT
Start: 2025-07-08 | End: 2025-07-08

## 2025-07-08 RX ORDER — MAGNESIUM SULFATE IN WATER 40 MG/ML
2000 INJECTION, SOLUTION INTRAVENOUS PRN
Status: DISCONTINUED | OUTPATIENT
Start: 2025-07-08 | End: 2025-07-12 | Stop reason: HOSPADM

## 2025-07-08 RX ORDER — MORPHINE SULFATE 2 MG/ML
2 INJECTION, SOLUTION INTRAMUSCULAR; INTRAVENOUS
Status: DISCONTINUED | OUTPATIENT
Start: 2025-07-08 | End: 2025-07-11

## 2025-07-08 RX ORDER — PANTOPRAZOLE SODIUM 40 MG/10ML
40 INJECTION, POWDER, LYOPHILIZED, FOR SOLUTION INTRAVENOUS DAILY
Status: DISCONTINUED | OUTPATIENT
Start: 2025-07-08 | End: 2025-07-10

## 2025-07-08 RX ORDER — POLYETHYLENE GLYCOL 3350 17 G/17G
17 POWDER, FOR SOLUTION ORAL DAILY PRN
Status: DISCONTINUED | OUTPATIENT
Start: 2025-07-08 | End: 2025-07-12 | Stop reason: HOSPADM

## 2025-07-08 RX ORDER — ACETAMINOPHEN 325 MG/1
650 TABLET ORAL EVERY 6 HOURS PRN
Status: DISCONTINUED | OUTPATIENT
Start: 2025-07-08 | End: 2025-07-12 | Stop reason: HOSPADM

## 2025-07-08 RX ORDER — SODIUM CHLORIDE 0.9 % (FLUSH) 0.9 %
5-40 SYRINGE (ML) INJECTION PRN
Status: DISCONTINUED | OUTPATIENT
Start: 2025-07-08 | End: 2025-07-12 | Stop reason: HOSPADM

## 2025-07-08 RX ORDER — SODIUM CHLORIDE 0.9 % (FLUSH) 0.9 %
5-40 SYRINGE (ML) INJECTION EVERY 12 HOURS SCHEDULED
Status: DISCONTINUED | OUTPATIENT
Start: 2025-07-08 | End: 2025-07-12 | Stop reason: HOSPADM

## 2025-07-08 RX ORDER — ONDANSETRON 2 MG/ML
4 INJECTION INTRAMUSCULAR; INTRAVENOUS EVERY 6 HOURS PRN
Status: DISCONTINUED | OUTPATIENT
Start: 2025-07-08 | End: 2025-07-10 | Stop reason: SDUPTHER

## 2025-07-08 RX ADMIN — HEPARIN SODIUM 5000 UNITS: 5000 INJECTION INTRAVENOUS; SUBCUTANEOUS at 21:30

## 2025-07-08 RX ADMIN — SODIUM CHLORIDE: 0.9 INJECTION, SOLUTION INTRAVENOUS at 21:22

## 2025-07-08 RX ADMIN — DROPERIDOL 1.25 MG: 2.5 INJECTION, SOLUTION INTRAMUSCULAR; INTRAVENOUS at 14:45

## 2025-07-08 RX ADMIN — MORPHINE SULFATE 4 MG: 4 INJECTION, SOLUTION INTRAMUSCULAR; INTRAVENOUS at 14:45

## 2025-07-08 RX ADMIN — PIPERACILLIN AND TAZOBACTAM 4500 MG: 4; .5 INJECTION, POWDER, FOR SOLUTION INTRAVENOUS at 19:53

## 2025-07-08 RX ADMIN — SODIUM CHLORIDE, PRESERVATIVE FREE 10 ML: 5 INJECTION INTRAVENOUS at 21:19

## 2025-07-08 RX ADMIN — PANTOPRAZOLE SODIUM 40 MG: 40 INJECTION, POWDER, LYOPHILIZED, FOR SOLUTION INTRAVENOUS at 14:45

## 2025-07-08 RX ADMIN — IOPAMIDOL 80 ML: 755 INJECTION, SOLUTION INTRAVENOUS at 18:25

## 2025-07-08 ASSESSMENT — PAIN SCALES - GENERAL
PAINLEVEL_OUTOF10: 9
PAINLEVEL_OUTOF10: 0
PAINLEVEL_OUTOF10: 9
PAINLEVEL_OUTOF10: 0

## 2025-07-08 ASSESSMENT — PAIN SCALES - WONG BAKER
WONGBAKER_NUMERICALRESPONSE: NO HURT

## 2025-07-08 ASSESSMENT — PAIN - FUNCTIONAL ASSESSMENT
PAIN_FUNCTIONAL_ASSESSMENT: WONG-BAKER FACES
PAIN_FUNCTIONAL_ASSESSMENT: 0-10
PAIN_FUNCTIONAL_ASSESSMENT: 0-10

## 2025-07-08 ASSESSMENT — PAIN DESCRIPTION - LOCATION: LOCATION: ABDOMEN

## 2025-07-08 NOTE — H&P
History & Physical    Patient:  Santiago Wiseman  YOB: 1960  Date of Service: 7/8/2025  MRN: [id number]   Acct:  [id number]   Primary Care Physician: Pablito Zapata, APRN - CNP    Chief Complaint: Abdominal pain, emesis    Assessment and plan:  Abdominal pain: Endorses mid upper abdominal pain and emesis x 2 weeks.  CT abdomen pelvis showing distended gallbladder, noted hyperbilirubinemia.  Concern for acute cholecystitis versus choledocholithiasis.  WBC mildly elevated 11.7.  Lactic acid within normal limits.  Started on Zosyn in ER with concerns for possible intra-abdominal infection.  General surgery consulted.  Will order MRCP.  UA with reflex ordered.  Continue Zosyn at this time.  Antiemetics/analgesics as needed.   Elevated liver enzymes with hyperbilirubinemia: ALT P132, AST 78, total bilirubin 1.9, direct bilirubin 1.0.  Likely secondary to above process.  IV fluids ordered overnight.  Repeat hepatic function panel in the morning.  MRCP ordered.  Consider GI consult based on MRCP results.  Acute hypoxic respiratory failure: SpO2 noted to be 87% on room air prior to leaving the ER.  Was placed on 2 L supplemental oxygen and SpO2 improved to 97%.  Possibly related to pain medication versus shallow breathing from abdominal pain.  Denied chest pain or shortness of breath at time of my exam.  Chest x-ray noted 9 mm nodular density in right upper lung, normal pulmonary vasculature, no pleural effusion or pneumothorax.  SpO2 most recently documented as 97% on 1 L.  Continue to wean to room air.  Encourage pulmonary hygiene.  Consider CT chest for further evaluation of nodular density versus outpatient follow-up based on clinical picture.  Metabolic acidosis: Serum bicarb 21, anion gap 16.  Lactic acid within normal limits.  Suspect secondary to decreased oral intake.  IV fluids ordered.  Repeat BMP in the morning.  Pancreatic cyst: 7 mm pancreatic cyst noted on CT.  MRCP ordered.  Bipolar  no indicators present

## 2025-07-08 NOTE — PROGRESS NOTES
Pharmacy Note - Extended Infusion Beta-Lactam Dose Adjustment    Piperacillin/Tazobactam 3375 mg x 1 extended infusion ordered for the treatment of intra-abdominal infection. Per [facility] Extended Infusion Beta-Lactam Policy, this will be changed to 4500 mg loading dose x 1    Estimated Creatinine Clearance: Estimated Creatinine Clearance: 71 mL/min (based on SCr of 0.6 mg/dL).    Dialysis Status, NIKO, CKD: Normal    BMI: Body mass index is 21.54 kg/m².    Rationale for Adjustment: Dose adjusted per [facility] Extended Infusion Policy based on renal function and indication. The above medication is renally eliminated and demonstrates time-dependent effects on bacterial eradication. Extended-infusion dosing strategy aims to enhance microbiologic and clinical efficacy.     Pharmacy will monitor renal function daily and adjust dose as necessary.    Please call with any questions.    Thank you,  Lily Simon, PharmD  7/8/2025 7:22 PM

## 2025-07-08 NOTE — ED PROVIDER NOTES
[facility] PEDI/MED SURG      EMERGENCY MEDICINE     Pt Name: Santiago Wiseman  MRN: [id number]  Birthdate 1960  Date of evaluation: 2025  Provider: LUCY Humphries    CHIEF COMPLAINT       Chief Complaint   Patient presents with    Vomiting     HISTORY OF PRESENT ILLNESS   Santiago Wiseman is a pleasant 64 y.o. female who presents to the emergency department from from home, by private vehicle for evaluation of emesis. Patient reports that she has been vomiting for 2 weeks and unable to keep fluids and solids down for the past week.  Patient also reports chest pain and abdominal pain. Reports associated  lightheadedness and dizziness.  Patient denies any diarrhea.  Reports last bowel movement was 2 days ago and was hard, small, pebble-like, and difficult to pass.  Denies shortness of breath.  Denies past abdominal surgeries, no recent travel, and denies consumption of uncooked food products.    Of note during physical examination patient continued to add to her's description of the HPI.     PASTMEDICAL HISTORY     Past Medical History:   Diagnosis Date    Anxiety     Asthma     Bipolar disorder (HCC)     Blood clotting disorder 2014    was on coumadin until 2014    Breast cyst 7-8 yrs ago    rt    Depression     Duodenal ulcer 2019    GERD (gastroesophageal reflux disease)     Hyperlipidemia     Hypertension     Schizophrenia (HCC)        Patient Active Problem List   Diagnosis Code    Alcohol abuse F10.10    Hypertension I10    Hyperlipidemia E78.5    GERD (gastroesophageal reflux disease) K21.9    Adenomatous colon polyp D12.6    Intermittent palpitations R00.2    Tobacco abuse Z72.0    Bipolar disorder, current episode mixed, moderate (HCC) F31.62    Macrocytic anemia D53.9    Hyperbilirubinemia E80.6     SURGICAL HISTORY       Past Surgical History:   Procedure Laterality Date    BREAST SURGERY  10 yrs    lump right     SECTION      x 1    ELBOW SURGERY Left 2013    Deep  injection 1.25 mg (1.25 mg IntraVENous Given 7/8/25 1445)   iopamidol (ISOVUE-370) 76 % injection 80 mL (80 mLs IntraVENous Given 7/8/25 1825)   piperacillin-tazobactam (ZOSYN) 4,500 mg in sodium chloride 0.9 % 100 mL IVPB (addEASE) (0 mg IntraVENous Stopped 7/8/25 2024)         PROCEDURES: (None if blank)      CRITICAL CARE: (None if blank)      DISCHARGE PRESCRIPTIONS: (None if blank)  Current Discharge Medication List          FINAL IMPRESSION      1. Acute cholecystitis          DISPOSITION/PLAN   DISPOSITION Admitted 07/08/2025 08:06:30 PM               OUTPATIENT FOLLOW UP THE PATIENT:  No follow-up provider specified.    LUCY Humphries Jeremy R, PA  07/08/25 2049

## 2025-07-08 NOTE — ED NOTES
Pt to ED c/o N/V for about 2 week and hasn't been able to keep down food/fluids for a week. Pt complaining of abdominal pain and chest pain. EKG completed. Pt states last bowel movement 2 days ago and was small. VSS

## 2025-07-08 NOTE — ED NOTES
Pt resting in bed. Vitals assessed. RR easy and unlabored. Report form [facility]. Assumed care at this time.

## 2025-07-09 ENCOUNTER — APPOINTMENT (OUTPATIENT)
Dept: MRI IMAGING | Age: 65
DRG: 263 | End: 2025-07-09
Payer: MEDICAID

## 2025-07-09 PROBLEM — K81.0 ACUTE CHOLECYSTITIS: Status: ACTIVE | Noted: 2025-07-09

## 2025-07-09 LAB
ALBUMIN SERPL BCG-MCNC: 2.6 G/DL (ref 3.4–4.9)
ALP SERPL-CCNC: 100 U/L (ref 38–126)
ALT SERPL W/O P-5'-P-CCNC: 25 U/L (ref 10–35)
ANION GAP SERPL CALC-SCNC: 15 MEQ/L (ref 8–16)
APTT PPP: 124.6 SECONDS (ref 22–38)
AST SERPL-CCNC: 55 U/L (ref 10–35)
BACTERIA URNS QL MICRO: ABNORMAL /HPF
BASOPHILS ABSOLUTE: 0 THOU/MM3 (ref 0–0.1)
BASOPHILS NFR BLD AUTO: 0.3 %
BILIRUB CONJ SERPL-MCNC: 1.2 MG/DL (ref 0–0.2)
BILIRUB SERPL-MCNC: 2 MG/DL (ref 0.3–1.2)
BILIRUB UR QL STRIP.AUTO: NEGATIVE
BUN SERPL-MCNC: 9 MG/DL (ref 8–23)
CALCIUM SERPL-MCNC: 7.8 MG/DL (ref 8.8–10.2)
CASTS #/AREA URNS LPF: ABNORMAL /LPF
CASTS 2: ABNORMAL /LPF
CHARACTER UR: CLEAR
CHLORIDE SERPL-SCNC: 102 MEQ/L (ref 98–111)
CO2 SERPL-SCNC: 22 MEQ/L (ref 22–29)
COLOR, UA: ABNORMAL
CREAT SERPL-MCNC: 0.7 MG/DL (ref 0.5–0.9)
CRYSTALS URNS MICRO: ABNORMAL
DEPRECATED RDW RBC AUTO: 45.5 FL (ref 35–45)
EOSINOPHIL NFR BLD AUTO: 0.8 %
EOSINOPHILS ABSOLUTE: 0.1 THOU/MM3 (ref 0–0.4)
EPITHELIAL CELLS, UA: ABNORMAL /HPF
ERYTHROCYTE [DISTWIDTH] IN BLOOD BY AUTOMATED COUNT: 12.2 % (ref 11.5–14.5)
GFR SERPL CREATININE-BSD FRML MDRD: > 90 ML/MIN/1.73M2
GLUCOSE SERPL-MCNC: 89 MG/DL (ref 74–109)
GLUCOSE UR QL STRIP.AUTO: NEGATIVE MG/DL
HCT VFR BLD AUTO: 38.7 % (ref 37–47)
HGB BLD-MCNC: 13.4 GM/DL (ref 12–16)
HGB UR QL STRIP.AUTO: NEGATIVE
IMM GRANULOCYTES # BLD AUTO: 0.03 THOU/MM3 (ref 0–0.07)
IMM GRANULOCYTES NFR BLD AUTO: 0.3 %
INR PPP: 1.12 (ref 0.85–1.13)
KETONES UR QL STRIP.AUTO: 40
LIPASE SERPL-CCNC: 35 U/L (ref 13–60)
LYMPHOCYTES ABSOLUTE: 1.6 THOU/MM3 (ref 1–4.8)
LYMPHOCYTES NFR BLD AUTO: 18.8 %
MAGNESIUM SERPL-MCNC: 1.6 MG/DL (ref 1.6–2.6)
MAGNESIUM SERPL-MCNC: 2.7 MG/DL (ref 1.6–2.6)
MCH RBC QN AUTO: 34.6 PG (ref 26–33)
MCHC RBC AUTO-ENTMCNC: 34.6 GM/DL (ref 32.2–35.5)
MCV RBC AUTO: 100 FL (ref 81–99)
MISCELLANEOUS 2: ABNORMAL
MONOCYTES ABSOLUTE: 1.1 THOU/MM3 (ref 0.4–1.3)
MONOCYTES NFR BLD AUTO: 13.1 %
NEUTROPHILS ABSOLUTE: 5.8 THOU/MM3 (ref 1.8–7.7)
NEUTROPHILS NFR BLD AUTO: 66.7 %
NITRITE UR QL STRIP: NEGATIVE
NRBC BLD AUTO-RTO: 0 /100 WBC
PH UR STRIP.AUTO: 5.5 [PH] (ref 5–9)
PLATELET # BLD AUTO: 184 THOU/MM3 (ref 130–400)
PMV BLD AUTO: 11.3 FL (ref 9.4–12.4)
POTASSIUM SERPL-SCNC: 3.3 MEQ/L (ref 3.5–5.2)
PROT SERPL-MCNC: 5.4 G/DL (ref 6.4–8.3)
PROT UR STRIP.AUTO-MCNC: ABNORMAL MG/DL
PROTHROMBIN TIME: 12.8 SECONDS (ref 10–13.5)
RBC # BLD AUTO: 3.87 MILL/MM3 (ref 4.2–5.4)
RBC URINE: ABNORMAL /HPF
RENAL EPI CELLS #/AREA URNS HPF: ABNORMAL /[HPF]
SODIUM SERPL-SCNC: 139 MEQ/L (ref 135–145)
SP GR UR REFRACT.AUTO: > 1.03 (ref 1–1.03)
UROBILINOGEN, URINE: 2 EU/DL (ref 0–1)
WBC # BLD AUTO: 8.7 THOU/MM3 (ref 4.8–10.8)
WBC #/AREA URNS HPF: ABNORMAL /HPF
WBC #/AREA URNS HPF: ABNORMAL /[HPF]
YEAST LIKE FUNGI URNS QL MICRO: ABNORMAL

## 2025-07-09 PROCEDURE — 6360000004 HC RX CONTRAST MEDICATION

## 2025-07-09 PROCEDURE — 6360000002 HC RX W HCPCS

## 2025-07-09 PROCEDURE — A9579 GAD-BASE MR CONTRAST NOS,1ML: HCPCS

## 2025-07-09 PROCEDURE — 99222 1ST HOSP IP/OBS MODERATE 55: CPT | Performed by: SURGERY

## 2025-07-09 PROCEDURE — 2500000003 HC RX 250 WO HCPCS

## 2025-07-09 PROCEDURE — 85025 COMPLETE CBC W/AUTO DIFF WBC: CPT

## 2025-07-09 PROCEDURE — 6370000000 HC RX 637 (ALT 250 FOR IP): Performed by: NURSE PRACTITIONER

## 2025-07-09 PROCEDURE — 80076 HEPATIC FUNCTION PANEL: CPT

## 2025-07-09 PROCEDURE — 99232 SBSQ HOSP IP/OBS MODERATE 35: CPT | Performed by: NURSE PRACTITIONER

## 2025-07-09 PROCEDURE — 6370000000 HC RX 637 (ALT 250 FOR IP)

## 2025-07-09 PROCEDURE — 83690 ASSAY OF LIPASE: CPT

## 2025-07-09 PROCEDURE — 1200000000 HC SEMI PRIVATE

## 2025-07-09 PROCEDURE — APPNB30 APP NON BILLABLE TIME 0-30 MINS

## 2025-07-09 PROCEDURE — 2580000003 HC RX 258

## 2025-07-09 PROCEDURE — 85730 THROMBOPLASTIN TIME PARTIAL: CPT

## 2025-07-09 PROCEDURE — 74183 MRI ABD W/O CNTR FLWD CNTR: CPT

## 2025-07-09 PROCEDURE — 85610 PROTHROMBIN TIME: CPT

## 2025-07-09 PROCEDURE — 83735 ASSAY OF MAGNESIUM: CPT

## 2025-07-09 PROCEDURE — 80048 BASIC METABOLIC PNL TOTAL CA: CPT

## 2025-07-09 PROCEDURE — 6360000002 HC RX W HCPCS: Performed by: PHYSICIAN ASSISTANT

## 2025-07-09 PROCEDURE — 81001 URINALYSIS AUTO W/SCOPE: CPT

## 2025-07-09 RX ORDER — GADOTERIDOL 279.3 MG/ML
15 INJECTION INTRAVENOUS
Status: COMPLETED | OUTPATIENT
Start: 2025-07-09 | End: 2025-07-09

## 2025-07-09 RX ORDER — SODIUM CHLORIDE 9 MG/ML
INJECTION, SOLUTION INTRAVENOUS CONTINUOUS
Status: DISCONTINUED | OUTPATIENT
Start: 2025-07-09 | End: 2025-07-11

## 2025-07-09 RX ORDER — HYDRALAZINE HYDROCHLORIDE 20 MG/ML
5 INJECTION INTRAMUSCULAR; INTRAVENOUS EVERY 6 HOURS PRN
Status: DISCONTINUED | OUTPATIENT
Start: 2025-07-09 | End: 2025-07-12 | Stop reason: HOSPADM

## 2025-07-09 RX ORDER — LORAZEPAM 1 MG/1
1 TABLET ORAL ONCE
Status: COMPLETED | OUTPATIENT
Start: 2025-07-09 | End: 2025-07-09

## 2025-07-09 RX ADMIN — HEPARIN SODIUM 5000 UNITS: 5000 INJECTION INTRAVENOUS; SUBCUTANEOUS at 06:08

## 2025-07-09 RX ADMIN — MORPHINE SULFATE 4 MG: 4 INJECTION, SOLUTION INTRAMUSCULAR; INTRAVENOUS at 19:57

## 2025-07-09 RX ADMIN — HEPARIN SODIUM 5000 UNITS: 5000 INJECTION INTRAVENOUS; SUBCUTANEOUS at 22:00

## 2025-07-09 RX ADMIN — PANTOPRAZOLE SODIUM 40 MG: 40 INJECTION, POWDER, LYOPHILIZED, FOR SOLUTION INTRAVENOUS at 09:10

## 2025-07-09 RX ADMIN — HEPARIN SODIUM 5000 UNITS: 5000 INJECTION INTRAVENOUS; SUBCUTANEOUS at 13:38

## 2025-07-09 RX ADMIN — PIPERACILLIN AND TAZOBACTAM 3375 MG: 3; .375 INJECTION, POWDER, FOR SOLUTION INTRAVENOUS at 10:11

## 2025-07-09 RX ADMIN — POTASSIUM CHLORIDE 40 MEQ: 1500 TABLET, EXTENDED RELEASE ORAL at 09:16

## 2025-07-09 RX ADMIN — PIPERACILLIN AND TAZOBACTAM 3375 MG: 3; .375 INJECTION, POWDER, FOR SOLUTION INTRAVENOUS at 02:39

## 2025-07-09 RX ADMIN — PIPERACILLIN AND TAZOBACTAM 3375 MG: 3; .375 INJECTION, POWDER, FOR SOLUTION INTRAVENOUS at 17:28

## 2025-07-09 RX ADMIN — GADOTERIDOL 15 ML: 279.3 INJECTION, SOLUTION INTRAVENOUS at 16:31

## 2025-07-09 RX ADMIN — SODIUM CHLORIDE, PRESERVATIVE FREE 10 ML: 5 INJECTION INTRAVENOUS at 09:10

## 2025-07-09 RX ADMIN — ONDANSETRON 4 MG: 2 INJECTION, SOLUTION INTRAMUSCULAR; INTRAVENOUS at 06:14

## 2025-07-09 RX ADMIN — LORAZEPAM 1 MG: 1 TABLET ORAL at 14:41

## 2025-07-09 RX ADMIN — MAGNESIUM SULFATE HEPTAHYDRATE 2000 MG: 40 INJECTION, SOLUTION INTRAVENOUS at 11:41

## 2025-07-09 ASSESSMENT — PAIN SCALES - GENERAL
PAINLEVEL_OUTOF10: 8
PAINLEVEL_OUTOF10: 8

## 2025-07-09 ASSESSMENT — PAIN DESCRIPTION - FREQUENCY: FREQUENCY: INTERMITTENT

## 2025-07-09 ASSESSMENT — PAIN DESCRIPTION - ONSET: ONSET: ON-GOING

## 2025-07-09 ASSESSMENT — PAIN DESCRIPTION - PAIN TYPE: TYPE: ACUTE PAIN

## 2025-07-09 ASSESSMENT — PAIN DESCRIPTION - DESCRIPTORS: DESCRIPTORS: CRAMPING

## 2025-07-09 ASSESSMENT — PAIN DESCRIPTION - ORIENTATION: ORIENTATION: RIGHT;UPPER

## 2025-07-09 ASSESSMENT — PAIN - FUNCTIONAL ASSESSMENT: PAIN_FUNCTIONAL_ASSESSMENT: ACTIVITIES ARE NOT PREVENTED

## 2025-07-09 ASSESSMENT — PAIN DESCRIPTION - LOCATION: LOCATION: ABDOMEN

## 2025-07-09 NOTE — ED NOTES
Pt placed on 3 L NC due to SPO2 87% during rest. Vitals assessed. Pt maintaining 92% at this time.

## 2025-07-09 NOTE — CONSULTS
tablet Take 1 tablet by mouth daily as needed for Sleep  Patient not taking: Reported on 7/8/2025 9/25/24   Pablito Zapata APRN - CNP   fluticasone (FLONASE) 50 MCG/ACT nasal spray 2 sprays by Nasal route daily  Patient not taking: Reported on 7/8/2025 9/25/24   Pablito Zapata APRN - CNP   loratadine (CLARITIN) 10 MG tablet Take 1 tablet by mouth daily  Patient not taking: Reported on 7/8/2025 9/25/24   Pablito Zapata APRN - CNP   hydrocortisone (ANUSOL-HC) 25 MG suppository Place 1 suppository rectally in the morning and 1 suppository in the evening.  Patient not taking: Reported on 7/8/2025 9/25/24   Pablito Zapata APRN - CNP   megestrol (MEGACE) 20 MG tablet TAKE 1 TABLET BY MOUTH EVERY DAY  Patient not taking: Reported on 7/8/2025 9/25/24   Pablito Zapata APRN - CNP   pantoprazole (PROTONIX) 40 MG tablet TAKE 1 TABLET BY MOUTH EVERY DAY  Patient not taking: Reported on 7/8/2025 2/27/24   Pablito Zapata APRN - CNP   lidocaine (LMX) 4 % cream Apply topically as needed for pain.  Patient not taking: Reported on 7/8/2025 4/17/22   Jen Deng PA-C   ASPIRIN LOW DOSE 81 MG EC tablet TAKE 1 TABLET BY MOUTH DAILY  Patient not taking: Reported on 7/8/2025 11/14/19   Pablito Zapata APRN - CNP    Scheduled Meds:   LORazepam  1 mg Oral Once    pantoprazole  40 mg IntraVENous Daily    sodium chloride flush  5-40 mL IntraVENous 2 times per day    heparin (porcine)  5,000 Units SubCUTAneous 3 times per day    piperacillin-tazobactam  3,375 mg IntraVENous Q8H     Continuous Infusions:   sodium chloride      sodium chloride       PRN Meds:.hydrALAZINE, sodium chloride flush, sodium chloride, potassium chloride **OR** potassium alternative oral replacement **OR** potassium chloride, magnesium sulfate, ondansetron **OR** ondansetron, polyethylene glycol, acetaminophen **OR** acetaminophen, morphine **OR** morphine  Allergies:  is allergic to pork-derived products.  Family History:  family history includes Breast  has been electronically signed by: Kirsten Cook MD on   07/08/2025 07:09 PM     All CTs at this facility use dose modulation techniques and iterative   reconstructions, and/or weight-based dosing  when appropriate to reduce radiation to a low as reasonably achievable.        Exam Ended: 07/08/25 18:32 EDT Last Resulted: 07/08/25 19:09 EDT     Total time spent in care of patient:  20 minutes collectively between subjective/objective examination, chart review, documentation, clinical reasoning and discussion with attending regarding plan/interval changes. More than 50% of the time involved with counseling, education and coordinating care.    Electronically signed by Nikkie Rosario PA-C on 7/9/2025 at 2:39 PM    Patient seen and examined independently by me 7/9/2025  Total time personally spent on this patient encounter was 40 minutes which includes :  Preparing to see the patient( reviewing tests and chart)  Obtaining and reviewing separately obtained history  Performing a medically appropriate examination and evaluation  Ordering medications, tests, or procedures  Counseling and educating the patient/family/caregiver  Care coordination  Referring and communicating with other healthcare professionals  Documenting clinical information in the EHR  Independent interpretation of results and communicating the results to patient and care team  This includes a direct physical exam as well as all the other encounter activities described above.   Time may be discontiguous.   Time does not include procedures.    Please see our orders that were directed and approved by me if there are any new ones for the updated patient care plan.    Above discussed and I agree with documentation and orders placed by Nikkie Rosario PA    See any additional comments if needed below for any other updated orders and plans.     -- Bowel rest.  Pain and nausea control.  MRCP today.  Repeat labs in AM.  Discussed pros and cons of cholecystectomy.

## 2025-07-09 NOTE — ED NOTES
ED to inpatient nurses report      Chief Complaint:  Chief Complaint   Patient presents with    Vomiting     Present to ED from: Home    MOA:     LOC: alert and orientated to name, place, date  Mobility: Requires assistance * 2- pt has not been up since coming in   Oxygen Baseline: Room Air    Current needs required: 2 L NC during rest     Code Status:   Prior    What abnormal results were found and what did you give/do to treat them? Acute cholecystitis  Any procedures or intervention occur? See MAR    Mental Status:  Level of Consciousness: Alert (0)    Psych Assessment:        Vitals:  Patient Vitals for the past 24 hrs:   BP Temp Temp src Pulse Resp SpO2 Weight   07/08/25 2022 -- -- -- 85 21 97 % --   07/08/25 2016 -- -- -- -- -- 92 % --   07/08/25 2014 125/70 -- -- 93 21 (!) 87 % --   07/08/25 1952 133/73 -- -- -- -- -- --   07/08/25 1917 (!) 151/89 -- -- 68 15 98 % --   07/08/25 1812 (!) 140/77 -- -- 81 16 100 % --   07/08/25 1727 133/68 -- -- 77 11 95 % --   07/08/25 1539 137/75 -- -- 70 14 95 % --   07/08/25 1447 (!) 149/80 -- -- 74 19 96 % --   07/08/25 1402 (!) 157/88 98.4 °F (36.9 °C) Oral 70 18 98 % 51.7 kg (114 lb)        LDAs:   Peripheral IV 07/08/25 Proximal;Right;Anterior Forearm (Active)   Site Assessment Clean, dry & intact 07/08/25 1917   Line Status Normal saline locked 07/08/25 1917   Phlebitis Assessment No symptoms 07/08/25 1917   Infiltration Assessment 0 07/08/25 1917   Alcohol Cap Used Yes 07/08/25 1917   Dressing Status Clean, dry & intact 07/08/25 1917   Dressing Type Transparent 07/08/25 1917       Ambulatory Status:  No data recorded    Diagnosis:  DISPOSITION Admitted 07/08/2025 08:06:30 PM   Final diagnoses:   Acute cholecystitis        Consults:  None     Pain Score:  Pain Assessment  Pain Assessment: 0-10  Pain Level: 9  Vega-Baker Pain Rating: No hurt  Pain Location: Abdomen    C-SSRS:        Sepsis Screening:       [place] Fall Risk:       Swallow Screening        Preferred

## 2025-07-09 NOTE — ED NOTES
Zosyn complete. Pt resting in bed. Son at bedside updated on POC. Vitals assessed. Pt remains on 2 L NC during rest.

## 2025-07-09 NOTE — PLAN OF CARE
Problem: Pain  Goal: Verbalizes/displays adequate comfort level or baseline comfort level  7/9/2025 1021 by Geraldine Cason RN  Outcome: Progressing  Flowsheets (Taken 7/8/2025 2242 by Bertha Santillan RN)  Verbalizes/displays adequate comfort level or baseline comfort level:   Encourage patient to monitor pain and request assistance   Assess pain using appropriate pain scale   Administer analgesics based on type and severity of pain and evaluate response   Implement non-pharmacological measures as appropriate and evaluate response  7/8/2025 2242 by Bertha Santillan RN  Outcome: Progressing  Flowsheets (Taken 7/8/2025 2242)  Verbalizes/displays adequate comfort level or baseline comfort level:   Encourage patient to monitor pain and request assistance   Assess pain using appropriate pain scale   Administer analgesics based on type and severity of pain and evaluate response   Implement non-pharmacological measures as appropriate and evaluate response     Problem: Safety - Adult  Goal: Free from fall injury  7/9/2025 1021 by Geraldine Cason RN  Outcome: Progressing  Flowsheets (Taken 7/8/2025 2242 by Bertha Santillan RN)  Free From Fall Injury: Instruct family/caregiver on patient safety  7/8/2025 2242 by Bertha Santillan RN  Outcome: Progressing  Flowsheets (Taken 7/8/2025 2242)  Free From Fall Injury: Instruct family/caregiver on patient safety     Problem: Neurosensory - Adult  Goal: Achieves maximal functionality and self care  7/9/2025 1021 by Geraldine Cason RN  Outcome: Progressing  Flowsheets (Taken 7/8/2025 2242 by Bertha Santillan RN)  Achieves maximal functionality and self care: Monitor swallowing and airway patency with patient fatigue and changes in neurological status  7/8/2025 2242 by Bertha Santillan RN  Outcome: Progressing  Flowsheets (Taken 7/8/2025 2242)  Achieves maximal functionality and self care: Monitor swallowing and airway patency with patient fatigue and changes  (Taken 7/8/2025 2242)  Absence of urinary retention:   Assess patient’s ability to void and empty bladder   Monitor intake/output and perform bladder scan as needed     Problem: Infection - Adult  Goal: Absence of infection at discharge  7/9/2025 1021 by Geraldine Cason RN  Outcome: Progressing  Flowsheets (Taken 7/8/2025 2242 by Bertha Santillan RN)  Absence of infection at discharge:   Assess and monitor for signs and symptoms of infection   Monitor lab/diagnostic results   Administer medications as ordered   Instruct and encourage patient and family to use good hand hygiene technique  7/8/2025 2242 by Bertha Santillan RN  Outcome: Progressing  Flowsheets (Taken 7/8/2025 2242)  Absence of infection at discharge:   Assess and monitor for signs and symptoms of infection   Monitor lab/diagnostic results   Administer medications as ordered   Instruct and encourage patient and family to use good hand hygiene technique     Problem: Metabolic/Fluid and Electrolytes - Adult  Goal: Electrolytes maintained within normal limits  7/9/2025 1021 by Geraldine Cason RN  Outcome: Progressing  Flowsheets (Taken 7/8/2025 2242 by Bertha Santillan RN)  Electrolytes maintained within normal limits:   Monitor labs and assess patient for signs and symptoms of electrolyte imbalances   Administer electrolyte replacement as ordered   Monitor response to electrolyte replacements, including repeat lab results as appropriate  7/8/2025 2242 by Bertha Santillan RN  Outcome: Progressing  Flowsheets (Taken 7/8/2025 2242)  Electrolytes maintained within normal limits:   Monitor labs and assess patient for signs and symptoms of electrolyte imbalances   Administer electrolyte replacement as ordered   Monitor response to electrolyte replacements, including repeat lab results as appropriate   Reviewed plan of care and all new orders for today. patient verbalized understanding.

## 2025-07-09 NOTE — PLAN OF CARE
Problem: Pain  Goal: Verbalizes/displays adequate comfort level or baseline comfort level  Outcome: Progressing  Flowsheets (Taken 7/8/2025 2242)  Verbalizes/displays adequate comfort level or baseline comfort level:   Encourage patient to monitor pain and request assistance   Assess pain using appropriate pain scale   Administer analgesics based on type and severity of pain and evaluate response   Implement non-pharmacological measures as appropriate and evaluate response     Problem: Safety - Adult  Goal: Free from fall injury  Outcome: Progressing  Flowsheets (Taken 7/8/2025 2242)  Free From Fall Injury: Instruct family/caregiver on patient safety     Problem: Neurosensory - Adult  Goal: Achieves maximal functionality and self care  Outcome: Progressing  Flowsheets (Taken 7/8/2025 2242)  Achieves maximal functionality and self care: Monitor swallowing and airway patency with patient fatigue and changes in neurological status     Problem: Cardiovascular - Adult  Goal: Absence of cardiac dysrhythmias or at baseline  Outcome: Progressing  Flowsheets (Taken 7/8/2025 2242)  Absence of cardiac dysrhythmias or at baseline:   Monitor cardiac rate and rhythm   Assess for signs of decreased cardiac output     Problem: Skin/Tissue Integrity - Adult  Goal: Skin integrity remains intact  Outcome: Progressing  Flowsheets (Taken 7/8/2025 2242)  Skin Integrity Remains Intact:   Monitor for areas of redness and/or skin breakdown   Pressure redistribution bed/mattress (bed type)   Check visual cues for pain   Monitor skin under medical devices     Problem: Gastrointestinal - Adult  Goal: Minimal or absence of nausea and vomiting  Outcome: Progressing  Flowsheets (Taken 7/8/2025 2242)  Minimal or absence of nausea and vomiting:   Administer IV fluids as ordered to ensure adequate hydration   Administer ordered antiemetic medications as needed   Provide nonpharmacologic comfort measures as appropriate     Problem: Gastrointestinal  - Adult  Goal: Maintains or returns to baseline bowel function  Outcome: Progressing  Flowsheets (Taken 7/8/2025 2242)  Maintains or returns to baseline bowel function:   Assess bowel function   Administer IV fluids as ordered to ensure adequate hydration   Administer ordered medications as needed   Encourage mobilization and activity     Problem: Gastrointestinal - Adult  Goal: Maintains adequate nutritional intake  Outcome: Progressing  Flowsheets (Taken 7/8/2025 2242)  Maintains adequate nutritional intake:   Monitor percentage of each meal consumed   Identify factors contributing to decreased intake, treat as appropriate   Assist with meals as needed   Monitor intake and output, weight and lab values     Problem: Genitourinary - Adult  Goal: Absence of urinary retention  Outcome: Progressing  Flowsheets (Taken 7/8/2025 2242)  Absence of urinary retention:   Assess patient’s ability to void and empty bladder   Monitor intake/output and perform bladder scan as needed     Problem: Infection - Adult  Goal: Absence of infection at discharge  Outcome: Progressing  Flowsheets (Taken 7/8/2025 2242)  Absence of infection at discharge:   Assess and monitor for signs and symptoms of infection   Monitor lab/diagnostic results   Administer medications as ordered   Instruct and encourage patient and family to use good hand hygiene technique     Problem: Metabolic/Fluid and Electrolytes - Adult  Goal: Electrolytes maintained within normal limits  Outcome: Progressing  Flowsheets (Taken 7/8/2025 2242)  Electrolytes maintained within normal limits:   Monitor labs and assess patient for signs and symptoms of electrolyte imbalances   Administer electrolyte replacement as ordered   Monitor response to electrolyte replacements, including repeat lab results as appropriate       Care plan reviewed with patient and son. Patient and son verbalize understanding of plan of care and contribute to goal setting.

## 2025-07-10 ENCOUNTER — ANESTHESIA EVENT (OUTPATIENT)
Dept: OPERATING ROOM | Age: 65
End: 2025-07-10
Payer: MEDICAID

## 2025-07-10 ENCOUNTER — APPOINTMENT (OUTPATIENT)
Dept: ULTRASOUND IMAGING | Age: 65
DRG: 263 | End: 2025-07-10
Payer: MEDICAID

## 2025-07-10 ENCOUNTER — ANESTHESIA (OUTPATIENT)
Dept: OPERATING ROOM | Age: 65
End: 2025-07-10
Payer: MEDICAID

## 2025-07-10 LAB
ALBUMIN SERPL BCG-MCNC: 2.5 G/DL (ref 3.4–4.9)
ALP SERPL-CCNC: 91 U/L (ref 38–126)
ALT SERPL W/O P-5'-P-CCNC: 21 U/L (ref 10–35)
ANION GAP SERPL CALC-SCNC: 15 MEQ/L (ref 8–16)
AST SERPL-CCNC: 40 U/L (ref 10–35)
BILIRUB CONJ SERPL-MCNC: 1 MG/DL (ref 0–0.2)
BILIRUB SERPL-MCNC: 1.8 MG/DL (ref 0.3–1.2)
BUN SERPL-MCNC: 5 MG/DL (ref 8–23)
CALCIUM SERPL-MCNC: 8 MG/DL (ref 8.8–10.2)
CHLORIDE SERPL-SCNC: 100 MEQ/L (ref 98–111)
CO2 SERPL-SCNC: 20 MEQ/L (ref 22–29)
CREAT SERPL-MCNC: 0.6 MG/DL (ref 0.5–0.9)
GFR SERPL CREATININE-BSD FRML MDRD: > 90 ML/MIN/1.73M2
GLUCOSE SERPL-MCNC: 80 MG/DL (ref 74–109)
POTASSIUM SERPL-SCNC: 4 MEQ/L (ref 3.5–5.2)
PROT SERPL-MCNC: 5.3 G/DL (ref 6.4–8.3)
SODIUM SERPL-SCNC: 135 MEQ/L (ref 135–145)

## 2025-07-10 PROCEDURE — 6360000002 HC RX W HCPCS: Performed by: ANESTHESIOLOGY

## 2025-07-10 PROCEDURE — APPNB30 APP NON BILLABLE TIME 0-30 MINS

## 2025-07-10 PROCEDURE — 88304 TISSUE EXAM BY PATHOLOGIST: CPT

## 2025-07-10 PROCEDURE — 76705 ECHO EXAM OF ABDOMEN: CPT

## 2025-07-10 PROCEDURE — 3700000000 HC ANESTHESIA ATTENDED CARE: Performed by: SURGERY

## 2025-07-10 PROCEDURE — 2580000003 HC RX 258: Performed by: SURGERY

## 2025-07-10 PROCEDURE — 3600000019 HC SURGERY ROBOT ADDTL 15MIN: Performed by: SURGERY

## 2025-07-10 PROCEDURE — 2580000003 HC RX 258

## 2025-07-10 PROCEDURE — 3700000001 HC ADD 15 MINUTES (ANESTHESIA): Performed by: SURGERY

## 2025-07-10 PROCEDURE — S2900 ROBOTIC SURGICAL SYSTEM: HCPCS | Performed by: SURGERY

## 2025-07-10 PROCEDURE — 6360000002 HC RX W HCPCS: Performed by: SURGERY

## 2025-07-10 PROCEDURE — 80076 HEPATIC FUNCTION PANEL: CPT

## 2025-07-10 PROCEDURE — C1889 IMPLANT/INSERT DEVICE, NOC: HCPCS | Performed by: SURGERY

## 2025-07-10 PROCEDURE — 6360000002 HC RX W HCPCS: Performed by: PHYSICIAN ASSISTANT

## 2025-07-10 PROCEDURE — 1200000000 HC SEMI PRIVATE

## 2025-07-10 PROCEDURE — 2709999900 HC NON-CHARGEABLE SUPPLY: Performed by: SURGERY

## 2025-07-10 PROCEDURE — 6360000002 HC RX W HCPCS

## 2025-07-10 PROCEDURE — 80048 BASIC METABOLIC PNL TOTAL CA: CPT

## 2025-07-10 PROCEDURE — 7100000001 HC PACU RECOVERY - ADDTL 15 MIN: Performed by: SURGERY

## 2025-07-10 PROCEDURE — 8E0W4CZ ROBOTIC ASSISTED PROCEDURE OF TRUNK REGION, PERCUTANEOUS ENDOSCOPIC APPROACH: ICD-10-PCS | Performed by: SURGERY

## 2025-07-10 PROCEDURE — 7100000000 HC PACU RECOVERY - FIRST 15 MIN: Performed by: SURGERY

## 2025-07-10 PROCEDURE — 3600000009 HC SURGERY ROBOT BASE: Performed by: SURGERY

## 2025-07-10 PROCEDURE — 2500000003 HC RX 250 WO HCPCS

## 2025-07-10 PROCEDURE — 99232 SBSQ HOSP IP/OBS MODERATE 35: CPT | Performed by: PHYSICIAN ASSISTANT

## 2025-07-10 PROCEDURE — 0FT44ZZ RESECTION OF GALLBLADDER, PERCUTANEOUS ENDOSCOPIC APPROACH: ICD-10-PCS | Performed by: SURGERY

## 2025-07-10 PROCEDURE — 6370000000 HC RX 637 (ALT 250 FOR IP): Performed by: SURGERY

## 2025-07-10 PROCEDURE — 47562 LAPAROSCOPIC CHOLECYSTECTOMY: CPT | Performed by: SURGERY

## 2025-07-10 PROCEDURE — 2580000003 HC RX 258: Performed by: NURSE PRACTITIONER

## 2025-07-10 DEVICE — CLIP INT L POLYMER LOK LIG HEM O LOK (6EA/PK): Type: IMPLANTABLE DEVICE | Status: FUNCTIONAL

## 2025-07-10 RX ORDER — ONDANSETRON 2 MG/ML
INJECTION INTRAMUSCULAR; INTRAVENOUS
Status: DISCONTINUED | OUTPATIENT
Start: 2025-07-10 | End: 2025-07-10 | Stop reason: SDUPTHER

## 2025-07-10 RX ORDER — ROCURONIUM BROMIDE 10 MG/ML
INJECTION, SOLUTION INTRAVENOUS
Status: DISCONTINUED | OUTPATIENT
Start: 2025-07-10 | End: 2025-07-10 | Stop reason: SDUPTHER

## 2025-07-10 RX ORDER — SODIUM CHLORIDE 0.9 % (FLUSH) 0.9 %
5-40 SYRINGE (ML) INJECTION PRN
Status: DISCONTINUED | OUTPATIENT
Start: 2025-07-10 | End: 2025-07-12 | Stop reason: HOSPADM

## 2025-07-10 RX ORDER — FENTANYL CITRATE 50 UG/ML
INJECTION, SOLUTION INTRAMUSCULAR; INTRAVENOUS
Status: COMPLETED
Start: 2025-07-10 | End: 2025-07-10

## 2025-07-10 RX ORDER — ONDANSETRON 4 MG/1
4 TABLET, ORALLY DISINTEGRATING ORAL EVERY 8 HOURS PRN
Status: DISCONTINUED | OUTPATIENT
Start: 2025-07-10 | End: 2025-07-12 | Stop reason: HOSPADM

## 2025-07-10 RX ORDER — ENOXAPARIN SODIUM 100 MG/ML
40 INJECTION SUBCUTANEOUS DAILY
Status: DISCONTINUED | OUTPATIENT
Start: 2025-07-11 | End: 2025-07-12 | Stop reason: HOSPADM

## 2025-07-10 RX ORDER — DEXAMETHASONE SODIUM PHOSPHATE 4 MG/ML
INJECTION, SOLUTION INTRA-ARTICULAR; INTRALESIONAL; INTRAMUSCULAR; INTRAVENOUS; SOFT TISSUE
Status: DISCONTINUED | OUTPATIENT
Start: 2025-07-10 | End: 2025-07-10 | Stop reason: SDUPTHER

## 2025-07-10 RX ORDER — KETOROLAC TROMETHAMINE 30 MG/ML
INJECTION, SOLUTION INTRAMUSCULAR; INTRAVENOUS
Status: DISCONTINUED | OUTPATIENT
Start: 2025-07-10 | End: 2025-07-10 | Stop reason: SDUPTHER

## 2025-07-10 RX ORDER — SODIUM CHLORIDE 9 MG/ML
INJECTION, SOLUTION INTRAVENOUS CONTINUOUS
Status: DISCONTINUED | OUTPATIENT
Start: 2025-07-10 | End: 2025-07-11

## 2025-07-10 RX ORDER — SODIUM CHLORIDE 0.9 % (FLUSH) 0.9 %
5-40 SYRINGE (ML) INJECTION EVERY 12 HOURS SCHEDULED
Status: DISCONTINUED | OUTPATIENT
Start: 2025-07-10 | End: 2025-07-12 | Stop reason: HOSPADM

## 2025-07-10 RX ORDER — KETOROLAC TROMETHAMINE 30 MG/ML
15 INJECTION, SOLUTION INTRAMUSCULAR; INTRAVENOUS EVERY 6 HOURS
Status: DISCONTINUED | OUTPATIENT
Start: 2025-07-10 | End: 2025-07-12 | Stop reason: HOSPADM

## 2025-07-10 RX ORDER — INDOCYANINE GREEN AND WATER 25 MG
5 KIT INJECTION ONCE
Status: COMPLETED | OUTPATIENT
Start: 2025-07-10 | End: 2025-07-10

## 2025-07-10 RX ORDER — FENTANYL CITRATE 50 UG/ML
50 INJECTION, SOLUTION INTRAMUSCULAR; INTRAVENOUS EVERY 5 MIN PRN
Status: COMPLETED | OUTPATIENT
Start: 2025-07-10 | End: 2025-07-10

## 2025-07-10 RX ORDER — HYOSCYAMINE SULFATE 0.125 MG
0.12 TABLET,DISINTEGRATING ORAL EVERY 4 HOURS PRN
Status: DISCONTINUED | OUTPATIENT
Start: 2025-07-10 | End: 2025-07-12 | Stop reason: HOSPADM

## 2025-07-10 RX ORDER — FENTANYL CITRATE 50 UG/ML
INJECTION, SOLUTION INTRAMUSCULAR; INTRAVENOUS
Status: DISCONTINUED | OUTPATIENT
Start: 2025-07-10 | End: 2025-07-10 | Stop reason: SDUPTHER

## 2025-07-10 RX ORDER — SODIUM CHLORIDE 9 MG/ML
INJECTION, SOLUTION INTRAVENOUS PRN
Status: DISCONTINUED | OUTPATIENT
Start: 2025-07-10 | End: 2025-07-10 | Stop reason: HOSPADM

## 2025-07-10 RX ORDER — SODIUM CHLORIDE 0.9 % (FLUSH) 0.9 %
5-40 SYRINGE (ML) INJECTION PRN
Status: DISCONTINUED | OUTPATIENT
Start: 2025-07-10 | End: 2025-07-10 | Stop reason: HOSPADM

## 2025-07-10 RX ORDER — MIDAZOLAM HYDROCHLORIDE 1 MG/ML
INJECTION, SOLUTION INTRAMUSCULAR; INTRAVENOUS
Status: DISCONTINUED | OUTPATIENT
Start: 2025-07-10 | End: 2025-07-10 | Stop reason: SDUPTHER

## 2025-07-10 RX ORDER — SODIUM CHLORIDE 9 MG/ML
INJECTION, SOLUTION INTRAVENOUS PRN
Status: DISCONTINUED | OUTPATIENT
Start: 2025-07-10 | End: 2025-07-12 | Stop reason: HOSPADM

## 2025-07-10 RX ORDER — LIDOCAINE HYDROCHLORIDE 20 MG/ML
INJECTION, SOLUTION INTRAVENOUS
Status: DISCONTINUED | OUTPATIENT
Start: 2025-07-10 | End: 2025-07-10 | Stop reason: SDUPTHER

## 2025-07-10 RX ORDER — FAMOTIDINE 20 MG/1
20 TABLET, FILM COATED ORAL 2 TIMES DAILY
Status: DISCONTINUED | OUTPATIENT
Start: 2025-07-10 | End: 2025-07-12 | Stop reason: HOSPADM

## 2025-07-10 RX ORDER — BUPIVACAINE HYDROCHLORIDE 5 MG/ML
INJECTION, SOLUTION EPIDURAL; INTRACAUDAL; PERINEURAL PRN
Status: DISCONTINUED | OUTPATIENT
Start: 2025-07-10 | End: 2025-07-10 | Stop reason: ALTCHOICE

## 2025-07-10 RX ORDER — SODIUM CHLORIDE 0.9 % (FLUSH) 0.9 %
5-40 SYRINGE (ML) INJECTION EVERY 12 HOURS SCHEDULED
Status: DISCONTINUED | OUTPATIENT
Start: 2025-07-10 | End: 2025-07-10 | Stop reason: HOSPADM

## 2025-07-10 RX ORDER — PROPOFOL 10 MG/ML
INJECTION, EMULSION INTRAVENOUS
Status: DISCONTINUED | OUTPATIENT
Start: 2025-07-10 | End: 2025-07-10 | Stop reason: SDUPTHER

## 2025-07-10 RX ORDER — ALBUTEROL SULFATE 90 UG/1
2 INHALANT RESPIRATORY (INHALATION) EVERY 6 HOURS PRN
Status: DISCONTINUED | OUTPATIENT
Start: 2025-07-10 | End: 2025-07-11

## 2025-07-10 RX ORDER — ONDANSETRON 2 MG/ML
4 INJECTION INTRAMUSCULAR; INTRAVENOUS EVERY 6 HOURS PRN
Status: DISCONTINUED | OUTPATIENT
Start: 2025-07-10 | End: 2025-07-12 | Stop reason: HOSPADM

## 2025-07-10 RX ADMIN — KETOROLAC TROMETHAMINE 15 MG: 30 INJECTION, SOLUTION INTRAMUSCULAR; INTRAVENOUS at 15:10

## 2025-07-10 RX ADMIN — PANTOPRAZOLE SODIUM 40 MG: 40 INJECTION, POWDER, LYOPHILIZED, FOR SOLUTION INTRAVENOUS at 09:38

## 2025-07-10 RX ADMIN — KETOROLAC TROMETHAMINE 15 MG: 30 INJECTION, SOLUTION INTRAMUSCULAR at 18:04

## 2025-07-10 RX ADMIN — ROCURONIUM BROMIDE 50 MG: 10 INJECTION, SOLUTION INTRAVENOUS at 14:36

## 2025-07-10 RX ADMIN — PIPERACILLIN AND TAZOBACTAM 3375 MG: 3; .375 INJECTION, POWDER, FOR SOLUTION INTRAVENOUS at 14:33

## 2025-07-10 RX ADMIN — MIDAZOLAM 2 MG: 1 INJECTION INTRAMUSCULAR; INTRAVENOUS at 14:32

## 2025-07-10 RX ADMIN — FAMOTIDINE 20 MG: 20 TABLET, FILM COATED ORAL at 20:05

## 2025-07-10 RX ADMIN — PIPERACILLIN AND TAZOBACTAM 3375 MG: 3; .375 INJECTION, POWDER, FOR SOLUTION INTRAVENOUS at 17:58

## 2025-07-10 RX ADMIN — MORPHINE SULFATE 2 MG: 2 INJECTION, SOLUTION INTRAMUSCULAR; INTRAVENOUS at 09:47

## 2025-07-10 RX ADMIN — FENTANYL CITRATE 50 MCG: 50 INJECTION, SOLUTION INTRAMUSCULAR; INTRAVENOUS at 15:16

## 2025-07-10 RX ADMIN — PIPERACILLIN AND TAZOBACTAM 3375 MG: 3; .375 INJECTION, POWDER, FOR SOLUTION INTRAVENOUS at 02:10

## 2025-07-10 RX ADMIN — FENTANYL CITRATE 50 MCG: 50 INJECTION, SOLUTION INTRAMUSCULAR; INTRAVENOUS at 14:49

## 2025-07-10 RX ADMIN — PIPERACILLIN AND TAZOBACTAM 3375 MG: 3; .375 INJECTION, POWDER, FOR SOLUTION INTRAVENOUS at 09:41

## 2025-07-10 RX ADMIN — SODIUM CHLORIDE, PRESERVATIVE FREE 10 ML: 5 INJECTION INTRAVENOUS at 09:38

## 2025-07-10 RX ADMIN — PROPOFOL 150 MG: 10 INJECTION, EMULSION INTRAVENOUS at 14:36

## 2025-07-10 RX ADMIN — ONDANSETRON 4 MG: 2 INJECTION, SOLUTION INTRAMUSCULAR; INTRAVENOUS at 14:43

## 2025-07-10 RX ADMIN — SUGAMMADEX 200 MG: 100 INJECTION, SOLUTION INTRAVENOUS at 15:15

## 2025-07-10 RX ADMIN — INDOCYANINE GREEN 5 MG: KIT INTRAVENOUS at 14:24

## 2025-07-10 RX ADMIN — FENTANYL CITRATE 50 MCG: 50 INJECTION INTRAMUSCULAR; INTRAVENOUS at 15:50

## 2025-07-10 RX ADMIN — FENTANYL CITRATE 50 MCG: 50 INJECTION INTRAMUSCULAR; INTRAVENOUS at 15:40

## 2025-07-10 RX ADMIN — DEXAMETHASONE SODIUM PHOSPHATE 4 MG: 4 INJECTION, SOLUTION INTRAMUSCULAR; INTRAVENOUS at 14:43

## 2025-07-10 RX ADMIN — WATER 2000 MG: 1 INJECTION INTRAMUSCULAR; INTRAVENOUS; SUBCUTANEOUS at 14:27

## 2025-07-10 RX ADMIN — Medication 60 MG: at 14:36

## 2025-07-10 RX ADMIN — ONDANSETRON 4 MG: 2 INJECTION, SOLUTION INTRAMUSCULAR; INTRAVENOUS at 13:15

## 2025-07-10 RX ADMIN — FENTANYL CITRATE 50 MCG: 50 INJECTION, SOLUTION INTRAMUSCULAR; INTRAVENOUS at 14:58

## 2025-07-10 RX ADMIN — SODIUM CHLORIDE: 0.9 INJECTION, SOLUTION INTRAVENOUS at 23:38

## 2025-07-10 RX ADMIN — SODIUM CHLORIDE: 0.9 INJECTION, SOLUTION INTRAVENOUS at 13:22

## 2025-07-10 RX ADMIN — FENTANYL CITRATE 50 MCG: 50 INJECTION, SOLUTION INTRAMUSCULAR; INTRAVENOUS at 14:36

## 2025-07-10 ASSESSMENT — PAIN DESCRIPTION - FREQUENCY: FREQUENCY: INTERMITTENT

## 2025-07-10 ASSESSMENT — PAIN DESCRIPTION - PAIN TYPE: TYPE: ACUTE PAIN

## 2025-07-10 ASSESSMENT — PAIN SCALES - GENERAL
PAINLEVEL_OUTOF10: 7
PAINLEVEL_OUTOF10: 9
PAINLEVEL_OUTOF10: 8
PAINLEVEL_OUTOF10: 9
PAINLEVEL_OUTOF10: 9
PAINLEVEL_OUTOF10: 8

## 2025-07-10 ASSESSMENT — PAIN - FUNCTIONAL ASSESSMENT
PAIN_FUNCTIONAL_ASSESSMENT: PREVENTS OR INTERFERES SOME ACTIVE ACTIVITIES AND ADLS
PAIN_FUNCTIONAL_ASSESSMENT: ACTIVITIES ARE NOT PREVENTED

## 2025-07-10 ASSESSMENT — PAIN DESCRIPTION - LOCATION
LOCATION: ABDOMEN

## 2025-07-10 ASSESSMENT — PAIN DESCRIPTION - DESCRIPTORS
DESCRIPTORS: ACHING;CRAMPING
DESCRIPTORS: ACHING

## 2025-07-10 ASSESSMENT — PAIN DESCRIPTION - ONSET: ONSET: ON-GOING

## 2025-07-10 ASSESSMENT — PAIN DESCRIPTION - ORIENTATION
ORIENTATION: MID
ORIENTATION: UPPER
ORIENTATION: MID

## 2025-07-10 ASSESSMENT — PAIN SCALES - WONG BAKER: WONGBAKER_NUMERICALRESPONSE: HURTS A LITTLE BIT

## 2025-07-10 NOTE — PROGRESS NOTES
1527 Pt arrives to pacu with OPA. Pt appears to be in no acute distress. Pt minimally responsive to stimuli at this time. Respirations easy and unlabored. Noted snoring. Abdominal incision sites assessed with Circulating RN. CDIx4. Ice pack placed on abdomen. VSS.   1535 Pt restless in bed. Pt unable to following commands yet. Pt drifts off the sleep with eyes closed.   1540 Pt starting to wake up more, following commands. Pt disoriented to time and place. Pt slightly agitated. Pt says pain is 9/10. Unable to get a pain goal. 50mcg Fentanyl given.   1550 Pt oriented x3. Pt oriented to self, place and situation. Pt disoriented to time. Pt pain 9/10. 50mcg Fentanyl given. Pt oriented/disoriented at times.   1600 Report called to 5E. Spoke with RN. All questions and concerns addressed at this time. Pt Gary Conley, 2.   1610 Pt meets criteria for discharge from pacu. Transport at bedside. Pt transported to 5E in stable condition.

## 2025-07-10 NOTE — PROGRESS NOTES
Hospitalist Progress Note      Patient:  Santiago Wiseman 64 y.o. female       : 1960  Unit/Bed:[facility] OR (General) POOL R*    Date of Admission: 2025      ASSESSMENT AND PLAN    Active Problems  Acute cholecystitis  Elevated LFTs  Hyperbilirubinemia  Distended gallbladder on CT.  RUQ ultrasound reported sludge within gallbladder, hepatic steatosis.  No choledocholithiasis on MRCP  General Surgery consulted  Continue Zosyn (started )  Plan for lap valerie today  Nausea/vomiting  Due to above.  Antiemetics as needed  Pancreatic cyst  Renal cysts  MRCP reports subcentimeter cystic lesions in the pancreas.  Bilateral renal cysts.  1 year follow-up for pancreatic cyst with PCP    Resolved Problems  Hypokalemia  Acute hypoxia   Mild metabolic acidosis    Chronic Conditions (reviewed, stable, and home medications resumed, unless otherwise stated)  History of blood clotting disorder.  Was on Coumadin until 2014 per chart review  Bipolar disorder.  Not taking any medication      LDA: []CVC / []PICC / []Midline / []Baird / []Drains / []Mediport / [x]None  Antibiotics: Zosyn  Steroids: no  Labs (still needed?): [x]Yes / []No  IVF (still needed?): [x]Yes / []No    Level of care: []Step Down / [x]Med-Surg  Bed Status: [x]Inpatient / []Observation  Telemetry: []Yes / [x]No  PT/OT: []Yes / [x]No    DVT Prophylaxis: [x] Lovenox / [] Heparin / [] SCDs / [] Already on Systemic Anticoagulation / [] None     Expected discharge date:  1-2 days  Disposition: Home      Code status: Full Code     ===================================================================    Chief Complaint: Abdominal pain, emesis  Subjective (past 24 hours):   Patient reports nausea, no vomiting at this time.  Continued abdominal pain.  No fever/chills, shortness of breath, chest pain.  Planning for lap valerie this afternoon      Initial HPI 2025 per chart review:  The patient is a 64 y.o. female who presents with complaints of abdominal  pain and emesis over the last 2 weeks.  Reports mid upper abdominal pain that has been ongoing for approximately 2 weeks.  Notes nausea/emesis for that same period of time.  Has progressively worsened.  States she is unable to keep down food or drinks.  Reports last bowel movement was 2 days ago, hard stool.  Endorses that she has not been taking any of her home medications.  Concern for leukocytosis, hyperbilirubinemia, possible acute cholecystitis on imaging.  General surgery consulted by ER.  Upon my exam, patient is laying on her side in bed.  She does note improvement in her nausea and pain following droperidol and morphine.  She also given Zosyn in ER.  Patient is restless in bed and rolls over to her side, minimally answers questions.  Denies any chest pain or shortness of breath at time of my exam.     Medications:    Infusion Medications    sodium chloride 75 mL/hr at 07/10/25 1322    sodium chloride      Scheduled Medications    ceFAZolin  2,000 mg IntraVENous 30 Min Pre-Op    pantoprazole  40 mg IntraVENous Daily    sodium chloride flush  5-40 mL IntraVENous 2 times per day    [Held by provider] heparin (porcine)  5,000 Units SubCUTAneous 3 times per day    piperacillin-tazobactam  3,375 mg IntraVENous Q8H    PRN Meds: hydrALAZINE, sodium chloride flush, sodium chloride, potassium chloride **OR** potassium alternative oral replacement **OR** potassium chloride, magnesium sulfate, ondansetron **OR** ondansetron, polyethylene glycol, acetaminophen **OR** acetaminophen, morphine **OR** morphine    Exam:  /85   Pulse 52   Temp 98.1 °F (36.7 °C) (Oral)   Resp 18   Ht 1.549 m (5' 1\")   Wt 51.7 kg (114 lb)   SpO2 97%   BMI 21.54 kg/m²   General: No distress, appears stated age.   Eyes:  PERRL. Conjunctivae/corneas clear.  HENT: Head normal appearing. Nares normal. Oral mucosa moist.  Hearing intact.   Neck: Supple, with full range of motion. Trachea midline.  No gross JVD appreciated.  Respiratory:

## 2025-07-10 NOTE — PROGRESS NOTES
Patient arrived to 5E06 by bed following surgery. Patient drowsy, but oriented to name,DOBand place. States pain is 9/10. SP02 saturations 97% on room air. Lungs clear. Incision x4 to abdomen with surgical glue, dry and intact, ice pack in place. SCD's on bilaterally. IV infusing into RFA without complications. Care board reviewed with patient. Hourly rounding explained. Bed alarm turned on. Call light within reach.

## 2025-07-10 NOTE — CARE COORDINATION
Case Management Assessment Initial Evaluation    Date/Time of Evaluation: 7/10/2025 8:30 AM  Assessment Completed by: Renae Lew, RN    If patient is discharged prior to next notation, then this note serves as note for discharge by case management.    Patient Name: Santiago Alcantar                   YOB: 1960  Diagnosis: Acute cholecystitis [K81.0]  Hyperbilirubinemia [E80.6]                   Date / Time: 2025  1:54 PM  Location: [address] [place] [address]     Patient Admission Status: Inpatient   Readmission Risk Low 0-14, Mod 15-19), High > 20: Readmission Risk Score: 12.5    Current PCP: Pablito Zapata APRN - CNP  Health Care Decision Makers:   Primary Decision Maker: MARY JANE ALCANTAR - Child - [phone]    Primary Decision Maker: Emeterio Alcantar - Child - [phone]    Additional Case Management Notes: Admitted through ED with abdominal pain and emesis. Consulted Surgery due to CT showing distended GB. GB US to be done. IVF. Protonix iv daily. Zosyn iv q8hr.     Procedures:   7/10 OR with Dr. Staton: Robotic lap cholecystectomy, possible open planned.     Imagin/8 CXR: There is a 9 mm nodular density in the right upper lung. This could represent superimposed osseous structures however other etiologies can't be excluded. Further evaluation with chest CT is recommended.    CT abd/pelvis:   1. The gallbladder is distended. If there is any clinical concern for the possibility of acute cholecystitis recommend further evaluation with ultrasound.  2. There is a 7 mm pancreatic cyst. Recommend MRI follow-up.  3. There is fatty infiltration of the liver.    MRI Abd w/MRCP:   1. Moderate hepatic steatosis.  2. Bilateral renal cysts. No imaging follow up is required based on ACR  consensus guidelines. Any further follow up should be based on patient's risk of  malignancy.  3. Subcentimeter cystic lesions in the pancreas. 1 year follow-up can be  obtained.   7/10 US GB: ordered.    Patient

## 2025-07-10 NOTE — OP NOTE
[address] [address]                            OPERATIVE REPORT      PATIENT NAME: SEGUNDO ALCANTAR             : 1960  MED REC NO: [id number]                       ROOM: [facility]  ACCOUNT NO: [id number]                       ADMIT DATE: 2025  PROVIDER: Alpesh Staton MD      DATE OF PROCEDURE:  07/10/2025    SURGEON:  Alpesh Staton MD    PREOPERATIVE DIAGNOSIS:  Calculous cholecystitis.    POSTOPERATIVE DIAGNOSIS:  Calculous cholecystitis.    PROCEDURE:  Robotic laparoscopic cholecystectomy.    ASSISTANT:  KURT Velasquez    ANESTHESIA:  General/local.    ESTIMATED BLOOD LOSS:  10 mL.    DRAINS:  None.    COMPLICATIONS:  None.    DISPOSITION:  Stable to the recovery room.    INDICATIONS:  The patient is a 64-year-old female, who I had seen in the hospital due to right upper quadrant abdominal pain and biliary colic symptoms.  Concern for cholecystitis.  Both operative and nonoperative intervention plans were discussed.  Risks of surgery were further discussed.  Some of the risks included, but were not limited to, bleeding, infection, the need for reoperation, severe chronic postoperative pain or numbness, major vascular or nerve injury, cardiopulmonary complications, anesthetic complications, seroma or hematoma formation, wound breakdown, trocar site herniation, bile leak, bile duct injury, biloma formation, chronic pain, and death.  After all of the questions were answered in their entirety and the patient was completely aware of the current situation, she wished to proceed with surgery.    DESCRIPTION OF PROCEDURE:  After informed consent was signed and placed on the chart, the patient was taken back to the operating room and placed supine on the operating room table.  General anesthesia was induced.  Tolerated this throughout the case.  All pressure points were padded.  On preoperative antibiotics.  Bilateral lower

## 2025-07-10 NOTE — ANESTHESIA PRE PROCEDURE
Department of Anesthesiology  Preprocedure Note       Name:  Santiago Wiseman   Age:  64 y.o.  :  1960                                          MRN:  [id number]         Date:  7/10/2025      Surgeon: Surgeon(s):  Alpesh Staton MD    Procedure: Procedure(s):  Robot lap valerie poss open    Medications prior to admission:   Prior to Admission medications    Medication Sig Start Date End Date Taking? Authorizing Provider   folic acid (FOLVITE) 1 MG tablet Take 1 tablet by mouth daily  Patient not taking: Reported on 2025   Pablito Zapata APRN - CNP   lamoTRIgine (LAMICTAL) 25 MG tablet TAKE 2 TABLETS BY MOUTH TWICE A DAY  Patient not taking: Reported on 2025   Pablito Zapata APRN - CNP   lisinopril-hydroCHLOROthiazide (PRINZIDE;ZESTORETIC) 10-12.5 MG per tablet 1 tab po daily  Patient not taking: Reported on 2025   Pablito Zapata APRN - CNP   sertraline (ZOLOFT) 100 MG tablet 1 tab po daily  Patient not taking: Reported on 2025   Pablito Zapata APRN - CNP   simvastatin (ZOCOR) 40 MG tablet Take 1 tablet by mouth nightly  Patient not taking: Reported on 2025   Pablito Zapata APRN - CNP   thiamine mononitrate 100 MG tablet TAKE 1 TABLET BY MOUTH EVERY DAY  Patient not taking: Reported on 2025   Pablito Zapata APRN - CNP   traZODone (DESYREL) 50 MG tablet Take 1 tablet by mouth daily as needed for Sleep  Patient not taking: Reported on 2025   Pablito Zapata APRN - CNP   fluticasone (FLONASE) 50 MCG/ACT nasal spray 2 sprays by Nasal route daily  Patient not taking: Reported on 2025   Pablito Zapata APRN - CNP   loratadine (CLARITIN) 10 MG tablet Take 1 tablet by mouth daily  Patient not taking: Reported on 2025   Zapata, Pablito, APRN - CNP   hydrocortisone (ANUSOL-HC) 25 MG suppository Place 1 suppository rectally in the morning and 1 suppository in the evening.  Patient not taking: Reported on

## 2025-07-10 NOTE — BRIEF OP NOTE
Brief Postoperative Note      Patient: Santiago Wiseman  YOB: 1960  MRN: [id number]    Date of Procedure: 7/10/2025    Pre-Op Diagnosis Codes:      * Acute cholecystitis [K81.0]    Post-Op Diagnosis: Same       Procedure(s):  Robot laparoscopic cholecystectomy    Surgeon(s):  Alpesh Staton MD    Assistant:  First Assistant: Glenn Keita RN    Anesthesia: General    Estimated Blood Loss (mL): 10ml    Complications: None    Specimens:   ID Type Source Tests Collected by Time Destination   A : gallbladder Tissue Gallbladder SURGICAL PATHOLOGY Alpesh Staton MD 7/10/2025 1505        Implants:  Implant Name Type Inv. Item Serial No.  Lot No. LRB No. Used Action   CLIP INT L POLYMER TRINO LIG HEM O TRINO (6EA/PK) - [id number]  CLIP INT L POLYMER TRINO LIG HEM O TRINO (6EA/PK)  [employer]- [id number] N/A 1 Implanted         Drains: * No LDAs found *    Findings:  Present At Time Of Surgery (PATOS) (choose all levels that have infection present):No infection present    Other Findings: see op note    Electronically signed by Alpesh Staton MD on 7/10/2025 at 3:18 PM

## 2025-07-10 NOTE — PLAN OF CARE
Problem: Pain  Goal: Verbalizes/displays adequate comfort level or baseline comfort level  7/9/2025 2128 by Rosa Maria Segovia RN  Outcome: Progressing  Flowsheets (Taken 7/9/2025 2128)  Verbalizes/displays adequate comfort level or baseline comfort level:   Encourage patient to monitor pain and request assistance   Administer analgesics based on type and severity of pain and evaluate response   Assess pain using appropriate pain scale   Implement non-pharmacological measures as appropriate and evaluate response     Problem: Safety - Adult  Goal: Free from fall injury  7/9/2025 2128 by Rosa Maria Segovia RN  Outcome: Progressing  Flowsheets (Taken 7/9/2025 2128)  Free From Fall Injury: Instruct family/caregiver on patient safety     Problem: Neurosensory - Adult  Goal: Achieves maximal functionality and self care  7/9/2025 2128 by Rosa Maria Segovia RN  Outcome: Progressing  Flowsheets (Taken 7/9/2025 2128)  Achieves maximal functionality and self care: Encourage and assist patient to increase activity and self care with guidance from physical therapy/occupational therapy     Problem: Cardiovascular - Adult  Goal: Absence of cardiac dysrhythmias or at baseline  7/9/2025 2128 by Rosa Maria Segovia RN  Outcome: Progressing  Flowsheets (Taken 7/9/2025 2128)  Absence of cardiac dysrhythmias or at baseline: Monitor cardiac rate and rhythm     Problem: Skin/Tissue Integrity - Adult  Goal: Skin integrity remains intact  7/9/2025 2128 by Rosa Maria Segovia RN  Outcome: Progressing  Flowsheets (Taken 7/9/2025 2128)  Skin Integrity Remains Intact:   Monitor for areas of redness and/or skin breakdown   Assess vascular access sites hourly   Check visual cues for pain   Monitor skin under medical devices     Problem: Gastrointestinal - Adult  Goal: Minimal or absence of nausea and vomiting  7/9/2025 2128 by Rosa Maria Segovia RN  Outcome: Progressing  Flowsheets (Taken 7/9/2025 2128)  Minimal or absence of nausea and vomiting:   Administer IV fluids as ordered

## 2025-07-10 NOTE — PROGRESS NOTES
[facility]  [address]  General Surgery Daily Progress Note    Pt Name: Santiago Wiseman  Medical Record Number: [id number]  Date of Birth 1960   Today's Date: 7/10/2025    Hospital day # 2     ASSESSMENT   RUQ abdominal pain  Distended gallbladder possible acute cholecystitis  Hypokalemia  Hyperbilirubinemia  Nausea and vomiting  Pancreatic cyst  Diverticulosis  Alcohol use disorder  Blood clotting disorder  Mental illness - bipolar disorder, schizophrenia, depression, anxiety   has a past medical history of Anxiety, Asthma, Bipolar disorder (HCC), Blood clotting disorder, Breast cyst, Depression, Duodenal ulcer, GERD (gastroesophageal reflux disease), Hyperlipidemia, Hypertension, and Schizophrenia (HCC).  PLAN   N.p.o. okay for ice chips and sips  Nausea and pain control  Hospitalist for medical management  Antibiotics  IV fluids per hospitalist  On heparin injections. APTT elevated yesterday. Hold today.   MRCP reviewed. No gallbladder or biliary tree pathology seen  RUQ US today reviewed: showing sludge and positive sonographic Finn sign  Labs reviewed. LFTs still elevated but improved  Planning laparoscopic cholecystectomy later today. Ultrasound reviewed antibiotics. Procedure consent. Risk benefits and alternatives discussed. Patient agreeable to surgical intervention.  SUBJECTIVE   Chief complaint: RUQ pain     Patient was stable overnight. Chart reviewed. No fevers. Updated by nursing staff. Denies chest discomfort or dyspnea. Having some nausea but no vomiting; (+) belching, flatus and no BM. Tolerating Diet NPO Exceptions are: Ice Chips, Sips of Water with Meds diet. Urinating without issue. Pain controlled with analgesia. Still having right upper quadrant pain. Discussed obtaining ultrasound and possible cholecystectomy. Patient agreeable to plan.  CURRENT MEDICATIONS   Scheduled Meds:   pantoprazole  40 mg IntraVENous Daily    sodium chloride

## 2025-07-11 LAB
ANION GAP SERPL CALC-SCNC: 11 MEQ/L (ref 8–16)
BUN SERPL-MCNC: 3 MG/DL (ref 8–23)
CALCIUM SERPL-MCNC: 8.2 MG/DL (ref 8.8–10.2)
CHLORIDE SERPL-SCNC: 101 MEQ/L (ref 98–111)
CO2 SERPL-SCNC: 23 MEQ/L (ref 22–29)
CREAT SERPL-MCNC: 0.5 MG/DL (ref 0.5–0.9)
DEPRECATED RDW RBC AUTO: 43.8 FL (ref 35–45)
ERYTHROCYTE [DISTWIDTH] IN BLOOD BY AUTOMATED COUNT: 12.1 % (ref 11.5–14.5)
GFR SERPL CREATININE-BSD FRML MDRD: > 90 ML/MIN/1.73M2
GLUCOSE SERPL-MCNC: 118 MG/DL (ref 74–109)
HCT VFR BLD AUTO: 37.8 % (ref 37–47)
HGB BLD-MCNC: 13.5 GM/DL (ref 12–16)
MCH RBC QN AUTO: 35.1 PG (ref 26–33)
MCHC RBC AUTO-ENTMCNC: 35.7 GM/DL (ref 32.2–35.5)
MCV RBC AUTO: 98.2 FL (ref 81–99)
PLATELET # BLD AUTO: 179 THOU/MM3 (ref 130–400)
PMV BLD AUTO: 11.4 FL (ref 9.4–12.4)
POTASSIUM SERPL-SCNC: 4.2 MEQ/L (ref 3.5–5.2)
RBC # BLD AUTO: 3.85 MILL/MM3 (ref 4.2–5.4)
SODIUM SERPL-SCNC: 135 MEQ/L (ref 135–145)
WBC # BLD AUTO: 10.6 THOU/MM3 (ref 4.8–10.8)

## 2025-07-11 PROCEDURE — 1200000000 HC SEMI PRIVATE

## 2025-07-11 PROCEDURE — 6370000000 HC RX 637 (ALT 250 FOR IP): Performed by: PHYSICIAN ASSISTANT

## 2025-07-11 PROCEDURE — 6360000002 HC RX W HCPCS: Performed by: SURGERY

## 2025-07-11 PROCEDURE — 99024 POSTOP FOLLOW-UP VISIT: CPT

## 2025-07-11 PROCEDURE — 6370000000 HC RX 637 (ALT 250 FOR IP): Performed by: SURGERY

## 2025-07-11 PROCEDURE — 6360000002 HC RX W HCPCS: Performed by: PHYSICIAN ASSISTANT

## 2025-07-11 PROCEDURE — 2500000003 HC RX 250 WO HCPCS: Performed by: SURGERY

## 2025-07-11 PROCEDURE — 80048 BASIC METABOLIC PNL TOTAL CA: CPT

## 2025-07-11 PROCEDURE — 2580000003 HC RX 258: Performed by: SURGERY

## 2025-07-11 PROCEDURE — APPNB30 APP NON BILLABLE TIME 0-30 MINS

## 2025-07-11 PROCEDURE — 85027 COMPLETE CBC AUTOMATED: CPT

## 2025-07-11 RX ORDER — PROCHLORPERAZINE EDISYLATE 5 MG/ML
10 INJECTION INTRAMUSCULAR; INTRAVENOUS ONCE
Status: COMPLETED | OUTPATIENT
Start: 2025-07-11 | End: 2025-07-11

## 2025-07-11 RX ORDER — ALBUTEROL SULFATE 90 UG/1
2 INHALANT RESPIRATORY (INHALATION) EVERY 4 HOURS PRN
Status: DISCONTINUED | OUTPATIENT
Start: 2025-07-11 | End: 2025-07-12 | Stop reason: HOSPADM

## 2025-07-11 RX ADMIN — ENOXAPARIN SODIUM 40 MG: 100 INJECTION SUBCUTANEOUS at 07:52

## 2025-07-11 RX ADMIN — AMOXICILLIN AND CLAVULANATE POTASSIUM 1 TABLET: 875; 125 TABLET, FILM COATED ORAL at 21:37

## 2025-07-11 RX ADMIN — FAMOTIDINE 20 MG: 20 TABLET, FILM COATED ORAL at 21:37

## 2025-07-11 RX ADMIN — SODIUM CHLORIDE, PRESERVATIVE FREE 10 ML: 5 INJECTION INTRAVENOUS at 21:04

## 2025-07-11 RX ADMIN — FAMOTIDINE 20 MG: 20 TABLET, FILM COATED ORAL at 07:52

## 2025-07-11 RX ADMIN — ONDANSETRON 4 MG: 2 INJECTION, SOLUTION INTRAMUSCULAR; INTRAVENOUS at 08:41

## 2025-07-11 RX ADMIN — ONDANSETRON 4 MG: 4 TABLET, ORALLY DISINTEGRATING ORAL at 17:10

## 2025-07-11 RX ADMIN — KETOROLAC TROMETHAMINE 15 MG: 30 INJECTION, SOLUTION INTRAMUSCULAR at 11:36

## 2025-07-11 RX ADMIN — PIPERACILLIN AND TAZOBACTAM 3375 MG: 3; .375 INJECTION, POWDER, FOR SOLUTION INTRAVENOUS at 10:04

## 2025-07-11 RX ADMIN — SODIUM CHLORIDE, PRESERVATIVE FREE 5 ML: 5 INJECTION INTRAVENOUS at 21:38

## 2025-07-11 RX ADMIN — KETOROLAC TROMETHAMINE 15 MG: 30 INJECTION, SOLUTION INTRAMUSCULAR at 00:18

## 2025-07-11 RX ADMIN — KETOROLAC TROMETHAMINE 15 MG: 30 INJECTION, SOLUTION INTRAMUSCULAR at 06:21

## 2025-07-11 RX ADMIN — KETOROLAC TROMETHAMINE 15 MG: 30 INJECTION, SOLUTION INTRAMUSCULAR at 23:14

## 2025-07-11 RX ADMIN — PIPERACILLIN AND TAZOBACTAM 3375 MG: 3; .375 INJECTION, POWDER, FOR SOLUTION INTRAVENOUS at 02:19

## 2025-07-11 RX ADMIN — PROCHLORPERAZINE EDISYLATE 10 MG: 5 INJECTION INTRAMUSCULAR; INTRAVENOUS at 21:04

## 2025-07-11 ASSESSMENT — PAIN DESCRIPTION - LOCATION
LOCATION: ABDOMEN

## 2025-07-11 ASSESSMENT — PAIN - FUNCTIONAL ASSESSMENT
PAIN_FUNCTIONAL_ASSESSMENT: ACTIVITIES ARE NOT PREVENTED

## 2025-07-11 ASSESSMENT — PAIN SCALES - GENERAL
PAINLEVEL_OUTOF10: 8
PAINLEVEL_OUTOF10: 0
PAINLEVEL_OUTOF10: 6
PAINLEVEL_OUTOF10: 8
PAINLEVEL_OUTOF10: 8

## 2025-07-11 ASSESSMENT — PAIN DESCRIPTION - ORIENTATION
ORIENTATION: RIGHT;MID
ORIENTATION: RIGHT;MID

## 2025-07-11 ASSESSMENT — PAIN DESCRIPTION - DESCRIPTORS
DESCRIPTORS: SORE
DESCRIPTORS: ACHING;SORE
DESCRIPTORS: SORE;ACHING

## 2025-07-11 ASSESSMENT — PAIN DESCRIPTION - ONSET: ONSET: ON-GOING

## 2025-07-11 ASSESSMENT — PAIN DESCRIPTION - FREQUENCY: FREQUENCY: CONTINUOUS

## 2025-07-11 ASSESSMENT — PAIN SCALES - WONG BAKER: WONGBAKER_NUMERICALRESPONSE: NO HURT

## 2025-07-11 NOTE — DISCHARGE INSTRUCTIONS
CARING FOR YOUR INCISION AT HOME    Wash your hands thoroughly with soap and water before touching your incision.     Take a clean wash cloth with liquid antibacterial soap, for example Dial, and water and cleanse the incision. Pat dry with a clean towel.     Steri strips will fall off on their own. Do NOT pick and or pull at them.     Let the incision air dry before applying new dressing if needed.     Do NOT place anything in or on your incision other than a clean dressing.     Do NOT clean your incision with anything other than soap and water unless you were instructed to do so by your physician.     Do NOT touch your incision unless your hands are clean and you are performing incision care. Try to touch it at least as possible.     NO powders, lotions, perfumes, or deodorants near incisions.     If you notice an increase in drainage, only use a clean piece of gauze to cover the incision and notify your physician.            CHG soap given to patient at discharge. Instructions given on daily use of CHG to prevent infection. Patient voiced understanding.               Learning About Low-Fat Eating  What is low-fat eating?     Most food has some fat in it. Your body needs some fat to be healthy. But some kinds of fats are healthier than others.  In a low-fat eating plan, you try to choose healthier fats and eat fewer unhealthy fats. Healthy fats include olive and canola oil. Try to avoid eating too much saturated fat, such as in cheese and meats.  You do not need to cut all fat from your diet. But you can make healthier choices about the types and amount of fat you eat.  Even though it is a good idea to choose healthier fats, it is still important to be careful of how much fat you eat, because all fats are high in calories.  What are the different types of fats?  Unhealthy fat  Saturated fat. Saturated fats are mostly in animal foods, such as meat and dairy foods. Tropical oils, such as coconut oil, palm oil, and  cocoa butter, are also saturated fats.  Healthy fats  Monounsaturated fat. Monounsaturated fats are liquid at room temperature but get solid when refrigerated. Eating foods that are high in this fat may help lower your \"bad\" (LDL) cholesterol, keep your \"good\" (HDL) cholesterol level up, and lower your chances of getting coronary artery disease. This fat is found in canola oil, olive oil, peanut oil, olives, avocados, nuts, and nut butters.  Polyunsaturated fat. Polyunsaturated fats are liquid at room temperature. They are in safflower, sunflower, and corn oils. They are also the main fat in seafood. Omega-3 fatty acids are types of polyunsaturated fat. Eating fish may lower your chances of getting coronary artery disease. Fatty fish such as salmon and mackerel contain these healthy fatty acids. So do ground flaxseeds and flaxseed oil, soybeans, walnuts, and seeds.  Why cut down on unhealthy fats?  Eating foods that contain saturated fats can raise the LDL (\"bad\") cholesterol in your blood. Having a high level of LDL cholesterol increases your chance of hardening of the arteries (atherosclerosis), which can lead to heart disease, heart attack, and stroke.  In general:  No more than 10% of your daily calories should come from saturated fat. This is about 20 grams in a 2,000-calorie diet.  No more than 10% of your daily calories should come from polyunsaturated fat. This is about 20 grams in a 2,000-calorie diet.  Monounsaturated fats can be up to 15% of your daily calories. This is about 25 to 30 grams in a 2,000-calorie diet.  If you're not sure how much fat you should be eating or how many calories you need each day to stay at a healthy weight, talk to a registered dietitian. A dietitian can help you create a plan that's right for you.  What can you do to cut down on fat?  Foods like cheese, butter, sausage, and desserts can have a lot of unhealthy fats. Try these tips for healthier meals at home and when you eat

## 2025-07-11 NOTE — PROGRESS NOTES
significantly enlarged lymph nodes are seen.     BONES: No aggressive bony lesions noted.          IMPRESSION:  1. Moderate hepatic steatosis.  2. Bilateral renal cysts. No imaging follow up is required based on ACR  consensus guidelines. Any further follow up should be based on patient's risk of  malignancy.  3. Subcentimeter cystic lesions in the pancreas. 1 year follow-up can be  obtained.           **This report has been created using voice recognition software.  It may contain  minor errors which are inherent in voice recognition technology.**     Electronically signed by Dr. Chelsey Espinoza        Exam Ended: 07/09/25 16:30 EDT Last Resulted: 07/09/25 17:12 EDT         Electronically signed by Nikkie Rosario PA-C on 7/11/2025 at 2:40 PM

## 2025-07-11 NOTE — ANESTHESIA POSTPROCEDURE EVALUATION
Department of Anesthesiology  Postprocedure Note    Patient: Santiago Wiseman  MRN: [id number]  YOB: 1960  Date of evaluation: 7/11/2025    Procedure Summary       Date: 07/10/25 Room / Location: [facility] OR  / [facility] OR    Anesthesia Start: 1432 Anesthesia Stop: 1530    Procedure: Robot laparoscopic cholecystectomy Diagnosis:       Acute cholecystitis      (Acute cholecystitis [K81.0])    Surgeons: Alpesh Staton MD Responsible Provider: Glenn Arriaza DO    Anesthesia Type: general ASA Status: 2            Anesthesia Type: No value filed.    Rj Phase I: Rj Score: 10    Rj Phase II:      Anesthesia Post Evaluation    Patient location during evaluation: PACU  Patient participation: complete - patient participated  Level of consciousness: awake and alert  Airway patency: patent  Nausea & Vomiting: no vomiting and no nausea  Cardiovascular status: hemodynamically stable  Respiratory status: acceptable  Hydration status: stable  Pain management: adequate        No notable events documented.

## 2025-07-11 NOTE — PLAN OF CARE
Problem: Pain  Goal: Verbalizes/displays adequate comfort level or baseline comfort level  Outcome: Progressing  Flowsheets (Taken 7/11/2025 0027)  Verbalizes/displays adequate comfort level or baseline comfort level:   Encourage patient to monitor pain and request assistance   Administer analgesics based on type and severity of pain and evaluate response   Assess pain using appropriate pain scale   Implement non-pharmacological measures as appropriate and evaluate response     Problem: Safety - Adult  Goal: Free from fall injury  Outcome: Progressing  Flowsheets (Taken 7/11/2025 0027)  Free From Fall Injury: Instruct family/caregiver on patient safety     Problem: Neurosensory - Adult  Goal: Achieves maximal functionality and self care  Outcome: Progressing  Flowsheets (Taken 7/11/2025 0027)  Achieves maximal functionality and self care: Encourage and assist patient to increase activity and self care with guidance from physical therapy/occupational therapy     Problem: Cardiovascular - Adult  Goal: Absence of cardiac dysrhythmias or at baseline  Outcome: Progressing  Flowsheets (Taken 7/11/2025 0027)  Absence of cardiac dysrhythmias or at baseline: Monitor cardiac rate and rhythm     Problem: Skin/Tissue Integrity - Adult  Goal: Skin integrity remains intact  Outcome: Progressing  Flowsheets (Taken 7/11/2025 0027)  Skin Integrity Remains Intact:   Monitor for areas of redness and/or skin breakdown   Assess vascular access sites hourly   Check visual cues for pain   Monitor skin under medical devices     Problem: Gastrointestinal - Adult  Goal: Maintains adequate nutritional intake  Outcome: Progressing  Flowsheets (Taken 7/11/2025 0027)  Maintains adequate nutritional intake:   Monitor percentage of each meal consumed   Monitor intake and output, weight and lab values     Problem: Genitourinary - Adult  Goal: Absence of urinary retention  Outcome: Progressing  Flowsheets (Taken 7/11/2025 0027)  Absence of urinary

## 2025-07-11 NOTE — PROGRESS NOTES
Hospitalist Progress Note      Patient:  Santiago Wiseman 64 y.o. female       : 1960  Unit/Bed:5E-06/006-A    Date of Admission: 2025      ASSESSMENT AND PLAN    Active Problems  Acute cholecystitis  Elevated LFTs  Hyperbilirubinemia  Distended gallbladder on CT.  RUQ ultrasound reported sludge within gallbladder, hepatic steatosis.  No choledocholithiasis on MRCP  General Surgery consulted  Continue Zosyn (started ), plan Augmentin at dc.  S/p lap valerie 7/10.   ADAT. Discharge this evening if pt tolerating po intake.   Nausea/vomiting  Due to above.  Antiemetics as needed  Pancreatic cyst  Renal cysts  MRCP reports subcentimeter cystic lesions in the pancreas.  Bilateral renal cysts.  1 year follow-up for pancreatic cyst with PCP    Resolved Problems  Hypokalemia  Acute hypoxia   Mild metabolic acidosis    Chronic Conditions (reviewed, stable, and home medications resumed, unless otherwise stated)  History of blood clotting disorder.  Was on Coumadin until 2014 per chart review  Bipolar disorder.  Not taking any medication      LDA: []CVC / []PICC / []Midline / []Baird / []Drains / []Mediport / [x]None  Antibiotics: Zosyn  Steroids: no  Labs (still needed?): [x]Yes / []No  IVF (still needed?): [x]Yes / []No    Level of care: []Step Down / [x]Med-Surg  Bed Status: [x]Inpatient / []Observation  Telemetry: []Yes / [x]No  PT/OT: []Yes / [x]No    DVT Prophylaxis: [x] Lovenox / [] Heparin / [] SCDs / [] Already on Systemic Anticoagulation / [] None     Expected discharge date:  1-2 days  Disposition: Home      Code status: Full Code     ===================================================================    Chief Complaint: Abdominal pain, emesis  Subjective (past 24 hours):   Pt reports nausea, no vomiting. Abdominal pain. No f/c, sob, cp. Discussed diet and possible discharge this evening.       Initial HPI 2025 per chart review:  The patient is a 64 y.o. female who presents with

## 2025-07-11 NOTE — PLAN OF CARE
Problem: Pain  Goal: Verbalizes/displays adequate comfort level or baseline comfort level  Outcome: Progressing  Flowsheets (Taken 7/11/2025 0027 by Rosa Maria Segovia RN)  Verbalizes/displays adequate comfort level or baseline comfort level:   Encourage patient to monitor pain and request assistance   Administer analgesics based on type and severity of pain and evaluate response   Assess pain using appropriate pain scale   Implement non-pharmacological measures as appropriate and evaluate response     Problem: Safety - Adult  Goal: Free from fall injury  Outcome: Progressing  Flowsheets (Taken 7/11/2025 0027 by Rosa Maria Segovia RN)  Free From Fall Injury: Instruct family/caregiver on patient safety     Problem: Neurosensory - Adult  Goal: Achieves maximal functionality and self care  Outcome: Progressing  Flowsheets (Taken 7/11/2025 0027 by Rosa Maria Segovia RN)  Achieves maximal functionality and self care: Encourage and assist patient to increase activity and self care with guidance from physical therapy/occupational therapy     Problem: Cardiovascular - Adult  Goal: Absence of cardiac dysrhythmias or at baseline  Outcome: Progressing  Flowsheets (Taken 7/11/2025 0027 by Rosa Maria Segovia RN)  Absence of cardiac dysrhythmias or at baseline: Monitor cardiac rate and rhythm     Problem: Skin/Tissue Integrity - Adult  Goal: Skin integrity remains intact  Outcome: Progressing  Flowsheets (Taken 7/11/2025 0027 by Rosa Maria Segovia RN)  Skin Integrity Remains Intact:   Monitor for areas of redness and/or skin breakdown   Assess vascular access sites hourly   Check visual cues for pain   Monitor skin under medical devices     Problem: Gastrointestinal - Adult  Goal: Minimal or absence of nausea and vomiting  Outcome: Progressing  Flowsheets (Taken 7/9/2025 2128 by Rosa Maria Segovia, RN)  Minimal or absence of nausea and vomiting:   Administer IV fluids as ordered to ensure adequate hydration   Provide nonpharmacologic comfort measures as

## 2025-07-11 NOTE — CASE COMMUNICATION
7/11/25, 11:01 AM EDT    DISCHARGE ON GOING EVALUATION    [facility]       [facility] day: 3  Location: -06/006-A Reason for admit: Acute cholecystitis [K81.0]  Hyperbilirubinemia [E80.6]     Procedures: 7/10 Robot laparoscopic cholecystectomy with Dr Staton    Imaging since last note: 7/10 ct gallbladdder:There is sludge within the lumen of the gallbladder.  2. Hepatic steatosis.       Barriers to Discharge: advanced diet to regular. Regulate pain meds     PCP: Pablito Zapata APRN - CNP  Readmission Risk Score: 12.8    Patient Goals/Plan/Treatment Preferences: plans to go home with help of family. Denies any needs at this time for discharge.

## 2025-07-12 VITALS
TEMPERATURE: 98.2 F | DIASTOLIC BLOOD PRESSURE: 68 MMHG | OXYGEN SATURATION: 95 % | HEIGHT: 61 IN | SYSTOLIC BLOOD PRESSURE: 109 MMHG | BODY MASS INDEX: 21.52 KG/M2 | RESPIRATION RATE: 16 BRPM | HEART RATE: 66 BPM | WEIGHT: 114 LBS

## 2025-07-12 PROCEDURE — 6370000000 HC RX 637 (ALT 250 FOR IP): Performed by: PHYSICIAN ASSISTANT

## 2025-07-12 PROCEDURE — 6370000000 HC RX 637 (ALT 250 FOR IP): Performed by: SURGERY

## 2025-07-12 PROCEDURE — 99239 HOSP IP/OBS DSCHRG MGMT >30: CPT | Performed by: PHYSICIAN ASSISTANT

## 2025-07-12 PROCEDURE — 6360000002 HC RX W HCPCS: Performed by: SURGERY

## 2025-07-12 RX ORDER — HYDROCODONE BITARTRATE AND ACETAMINOPHEN 5; 325 MG/1; MG/1
1 TABLET ORAL EVERY 4 HOURS PRN
Qty: 10 TABLET | Refills: 0 | Status: SHIPPED | OUTPATIENT
Start: 2025-07-12 | End: 2025-07-15

## 2025-07-12 RX ORDER — ONDANSETRON 4 MG/1
4 TABLET, ORALLY DISINTEGRATING ORAL 3 TIMES DAILY PRN
Qty: 21 TABLET | Refills: 0 | Status: SHIPPED | OUTPATIENT
Start: 2025-07-12

## 2025-07-12 RX ADMIN — KETOROLAC TROMETHAMINE 15 MG: 30 INJECTION, SOLUTION INTRAMUSCULAR at 05:17

## 2025-07-12 RX ADMIN — AMOXICILLIN AND CLAVULANATE POTASSIUM 1 TABLET: 875; 125 TABLET, FILM COATED ORAL at 08:41

## 2025-07-12 RX ADMIN — FAMOTIDINE 20 MG: 20 TABLET, FILM COATED ORAL at 08:41

## 2025-07-12 ASSESSMENT — PAIN SCALES - GENERAL
PAINLEVEL_OUTOF10: 5
PAINLEVEL_OUTOF10: 8

## 2025-07-12 ASSESSMENT — PAIN - FUNCTIONAL ASSESSMENT: PAIN_FUNCTIONAL_ASSESSMENT: ACTIVITIES ARE NOT PREVENTED

## 2025-07-12 ASSESSMENT — PAIN DESCRIPTION - LOCATION: LOCATION: ABDOMEN

## 2025-07-12 ASSESSMENT — PAIN DESCRIPTION - ORIENTATION: ORIENTATION: RIGHT

## 2025-07-12 ASSESSMENT — PAIN DESCRIPTION - DESCRIPTORS: DESCRIPTORS: SORE

## 2025-07-12 NOTE — PROGRESS NOTES
[facility]  Dr. Alpesh Staton  General Surgery Daily Progress Note    Pt Name: Santiago Wiseman  Medical Record Number: [id number]  Date of Birth 1960   Today's Date: 7/12/2025    Hospital day # 4     ASSESSMENT   POD# 2 Robotic laparoscopic cholecystectomy.   Hyperbilirubinemia  Nausea and vomiting  Pancreatic cyst  Diverticulosis  Alcohol use disorder  Blood clotting disorder  Mental illness - bipolar disorder, schizophrenia, depression, anxiety   has a past medical history of Anxiety, Asthma, Bipolar disorder (HCC), Blood clotting disorder, Breast cyst, Depression, Duodenal ulcer, GERD (gastroesophageal reflux disease), Hyperlipidemia, Hypertension, and Schizophrenia (HCC).  PLAN   Tolerating diet  Nausea and pain control  Hospitalist for medical management  Antibiotics.  Okay to stop from a surgical standpoint  Surgical pathology pending  IV fluids per hospitalist  DVT prophylaxis  Incentive spirometry  Incisional care  Labs reviewed   Family at bedside - all questions answered.  Patient clinically doing better postoperatively.  Okay for discharge from a surgical standpoint.  Follow-up as outpatient in office in 10-14 days.  Surgical service signing off.  Call if need to reevaluate while patient still in house.    SUBJECTIVE   Chief complaint: RUQ pain     Patient was stable overnight. Chart reviewed. No fevers. Updated by nursing staff. Pain better controlled.  Ambulating.  Incisions clean and dry.  Tolerating diet.  Passing flatus.  Denies chest pain or shortness of breath.  CURRENT MEDICATIONS   Scheduled Meds:   amoxicillin-clavulanate  1 tablet Oral 2 times per day    sodium chloride flush  5-40 mL IntraVENous 2 times per day    famotidine  20 mg Oral BID    Or    famotidine (PEPCID) injection  20 mg IntraVENous BID    enoxaparin  40 mg SubCUTAneous Daily    ketorolac  15 mg IntraVENous Q6H    sodium chloride flush  5-40 mL IntraVENous 2 times per day    [Held by provider] heparin

## 2025-07-13 LAB
BACTERIA BLD AEROBE CULT: NORMAL
BACTERIA BLD AEROBE CULT: NORMAL

## 2025-07-14 ENCOUNTER — TELEPHONE (OUTPATIENT)
Dept: SURGERY | Age: 65
End: 2025-07-14

## 2025-07-14 ENCOUNTER — TELEPHONE (OUTPATIENT)
Dept: FAMILY MEDICINE CLINIC | Age: 65
End: 2025-07-14

## 2025-07-14 NOTE — DISCHARGE SUMMARY
Hospitalist Discharge Summary    Patient: Santiago Wiseman  YOB: 1960  MRN: [id number]   Acct: [id number]    Primary Care Physician: Pablito Zapata APRN - CNP    Admit date  7/8/2025    Discharge date: 7/12/2025     Chief Complaint on presentation: Abdominal pain, N/V    Summarized Hospital course:    64-year-old female admitted to hospitalist service for acute cholecystitis.  General surgery was consulted, patient went for laparoscopic cholecystectomy on 7/10.  Uncomplicated postop course.  Tolerating diet, okay for discharge from surgery standpoint.  Follow-up with PCP and general surgery    Discharge Assessment & Plan    Acute cholecystitis  Elevated LFTs  Hyperbilirubinemia  Distended gallbladder on CT.  RUQ ultrasound reported sludge within gallbladder, hepatic steatosis.  No choledocholithiasis on MRCP  General Surgery consulted  S/p Zosyn (7/8-7/12), no need for continue antibiotics at discharge.  S/p lap valerie 7/10.   Tolerating diet.  Abdominal pain is controlled  Nausea/vomiting, resolved  Due to above.  Antiemetics as needed  Pancreatic cyst  Renal cysts  MRCP reports subcentimeter cystic lesions in the pancreas.  Bilateral renal cysts.  1 year follow-up for pancreatic cyst with PCP     Resolved Problems  Hypokalemia  Acute hypoxia   Mild metabolic acidosis     Chronic Conditions (reviewed, stable, and home medications resumed, unless otherwise stated)  History of blood clotting disorder.  Was on Coumadin until April 2014 per chart review  Bipolar disorder.  Not taking any medication      Physical Exam:-  Vitals: No data found.  Weight: Weight - Scale: 51.7 kg (114 lb)     General appearance: No apparent distress, appears stated age and cooperative.   HEENT: Normal cephalic, atraumatic without obvious deformity. Pupils equal, round, and reactive to light.  Extra ocular muscles intact. Conjunctivae/corneas clear.  Neck: Supple, with full range of motion. No jugular venous distention.

## 2025-07-14 NOTE — TELEPHONE ENCOUNTER
Patient called c/o not having a bowel movement, she usually just drinks milk and her bowels would move.  She is taking the pain medication as prescribed.  She denies taking any stool softeners.  She is passing gas.  Recommended the patient take some Milk of Magnesium to get her bowels moving.  Advised her that her pain medication can constipate her and that she should take stool softeners to keep regular. She voiced understanding.

## 2025-07-14 NOTE — TELEPHONE ENCOUNTER
Pt states she has not been able have a BM in 3 weeks.. she states she was seen in hospital related to this issue. She has been feeling weak, dizzy, low energy, and unable to do anything. She states that her stomach is black and blue. She currently is not taking a stool softener. She states she is drinking milk but it does not help.     [facility] Transitions Initial Follow Up Call    Outreach made within 2 business days of discharge: Yes    Patient: Santiago Wiseman Patient : 1960   MRN: [id number]  Reason for Admission: There are no discharge diagnoses documented for the most recent discharge.  Discharge Date: 25       Spoke with: Santiago    Discharge department/facility: [facility] Interactive Patient Contact:  Was patient able to fill all prescriptions: Yes  Was patient instructed to bring all medications to the follow-up visit: Yes  Is patient taking all medications as directed in the discharge summary? Yes  Does patient understand their discharge instructions: Yes  Does patient have questions or concerns that need addressed prior to 7 day follow up office visit: no    Scheduled appointment with PCP within 7-14 days    Follow Up  Future Appointments   Date Time Provider Department Center   2025  1:20 PM Pablito Zapata APRN - CNP Fam Med [facility]   2025 10:00 AM Manohar Padilla APRN - CNP N Adv Surg [facility] - Sherrie Cervantes MA

## 2025-07-15 NOTE — TELEPHONE ENCOUNTER
Spoke with patient she will call her surgeon right now.     She wanted me to cancel her appointment for you. So I did.

## 2025-07-15 NOTE — TELEPHONE ENCOUNTER
If not able to have a BM I recommend she contact her surgeon so they can treat her for this Thanks

## 2025-07-16 ENCOUNTER — APPOINTMENT (OUTPATIENT)
Dept: INTERVENTIONAL RADIOLOGY/VASCULAR | Age: 65
End: 2025-07-16
Attending: PHYSICIAN ASSISTANT
Payer: MEDICAID

## 2025-07-16 ENCOUNTER — HOSPITAL ENCOUNTER (EMERGENCY)
Age: 65
Discharge: HOME | End: 2025-07-16
Payer: MEDICAID

## 2025-07-16 ENCOUNTER — APPOINTMENT (OUTPATIENT)
Dept: GENERAL RADIOLOGY | Age: 65
End: 2025-07-16
Payer: MEDICAID

## 2025-07-16 VITALS
SYSTOLIC BLOOD PRESSURE: 150 MMHG | WEIGHT: 114 LBS | HEART RATE: 100 BPM | RESPIRATION RATE: 18 BRPM | BODY MASS INDEX: 21.52 KG/M2 | HEIGHT: 61 IN | OXYGEN SATURATION: 99 % | DIASTOLIC BLOOD PRESSURE: 84 MMHG | TEMPERATURE: 98 F

## 2025-07-16 DIAGNOSIS — M79.89 LEG SWELLING: Primary | ICD-10-CM

## 2025-07-16 LAB
ALBUMIN SERPL BCG-MCNC: 3.1 G/DL (ref 3.4–4.9)
ALP SERPL-CCNC: 94 U/L (ref 38–126)
ALT SERPL W/O P-5'-P-CCNC: 54 U/L (ref 10–35)
ANION GAP SERPL CALC-SCNC: 14 MEQ/L (ref 8–16)
AST SERPL-CCNC: 121 U/L (ref 10–35)
BASOPHILS ABSOLUTE: 0.1 THOU/MM3 (ref 0–0.1)
BASOPHILS NFR BLD AUTO: 0.6 %
BILIRUB CONJ SERPL-MCNC: < 0.1 MG/DL (ref 0–0.2)
BILIRUB SERPL-MCNC: 0.3 MG/DL (ref 0.3–1.2)
BUN SERPL-MCNC: 9 MG/DL (ref 8–23)
CALCIUM SERPL-MCNC: 8.9 MG/DL (ref 8.8–10.2)
CHLORIDE SERPL-SCNC: 107 MEQ/L (ref 98–111)
CO2 SERPL-SCNC: 20 MEQ/L (ref 22–29)
CREAT SERPL-MCNC: 0.5 MG/DL (ref 0.5–0.9)
DEPRECATED RDW RBC AUTO: 49.2 FL (ref 35–45)
ECHO BSA: 1.49 M2
EKG ATRIAL RATE: 90 BPM
EKG P AXIS: 50 DEGREES
EKG P-R INTERVAL: 138 MS
EKG Q-T INTERVAL: 352 MS
EKG QRS DURATION: 72 MS
EKG QTC CALCULATION (BAZETT): 430 MS
EKG R AXIS: 37 DEGREES
EKG T AXIS: -29 DEGREES
EKG VENTRICULAR RATE: 90 BPM
EOSINOPHIL NFR BLD AUTO: 2.2 %
EOSINOPHILS ABSOLUTE: 0.2 THOU/MM3 (ref 0–0.4)
ERYTHROCYTE [DISTWIDTH] IN BLOOD BY AUTOMATED COUNT: 13 % (ref 11.5–14.5)
GFR SERPL CREATININE-BSD FRML MDRD: > 90 ML/MIN/1.73M2
GLUCOSE SERPL-MCNC: 125 MG/DL (ref 74–109)
HCT VFR BLD AUTO: 37.8 % (ref 37–47)
HGB BLD-MCNC: 12.5 GM/DL (ref 12–16)
IMM GRANULOCYTES # BLD AUTO: 0.06 THOU/MM3 (ref 0–0.07)
IMM GRANULOCYTES NFR BLD AUTO: 0.5 %
LYMPHOCYTES ABSOLUTE: 3.3 THOU/MM3 (ref 1–4.8)
LYMPHOCYTES NFR BLD AUTO: 30.1 %
MAGNESIUM SERPL-MCNC: 2.1 MG/DL (ref 1.6–2.6)
MCH RBC QN AUTO: 34.2 PG (ref 26–33)
MCHC RBC AUTO-ENTMCNC: 33.1 GM/DL (ref 32.2–35.5)
MCV RBC AUTO: 103.6 FL (ref 81–99)
MONOCYTES ABSOLUTE: 1.8 THOU/MM3 (ref 0.4–1.3)
MONOCYTES NFR BLD AUTO: 16.2 %
NEUTROPHILS ABSOLUTE: 5.5 THOU/MM3 (ref 1.8–7.7)
NEUTROPHILS NFR BLD AUTO: 50.4 %
NRBC BLD AUTO-RTO: 0 /100 WBC
NT-PROBNP SERPL IA-MCNC: 275 PG/ML (ref 0–124)
OSMOLALITY SERPL CALC.SUM OF ELEC: 281.4 MOSMOL/KG (ref 275–300)
PLATELET # BLD AUTO: 330 THOU/MM3 (ref 130–400)
PMV BLD AUTO: 10.6 FL (ref 9.4–12.4)
POTASSIUM SERPL-SCNC: 4.1 MEQ/L (ref 3.5–5.2)
PROT SERPL-MCNC: 6.3 G/DL (ref 6.4–8.3)
RBC # BLD AUTO: 3.65 MILL/MM3 (ref 4.2–5.4)
SODIUM SERPL-SCNC: 141 MEQ/L (ref 135–145)
TROPONIN, HIGH SENSITIVITY: < 6 NG/L (ref 0–12)
WBC # BLD AUTO: 11 THOU/MM3 (ref 4.8–10.8)

## 2025-07-16 PROCEDURE — 85025 COMPLETE CBC W/AUTO DIFF WBC: CPT

## 2025-07-16 PROCEDURE — 82248 BILIRUBIN DIRECT: CPT

## 2025-07-16 PROCEDURE — 71046 X-RAY EXAM CHEST 2 VIEWS: CPT

## 2025-07-16 PROCEDURE — 93970 EXTREMITY STUDY: CPT

## 2025-07-16 PROCEDURE — 83880 ASSAY OF NATRIURETIC PEPTIDE: CPT

## 2025-07-16 PROCEDURE — 83735 ASSAY OF MAGNESIUM: CPT

## 2025-07-16 PROCEDURE — 84484 ASSAY OF TROPONIN QUANT: CPT

## 2025-07-16 PROCEDURE — 93010 ELECTROCARDIOGRAM REPORT: CPT | Performed by: NUCLEAR MEDICINE

## 2025-07-16 PROCEDURE — 80053 COMPREHEN METABOLIC PANEL: CPT

## 2025-07-16 PROCEDURE — 93005 ELECTROCARDIOGRAM TRACING: CPT | Performed by: PHYSICIAN ASSISTANT

## 2025-07-16 PROCEDURE — 36415 COLL VENOUS BLD VENIPUNCTURE: CPT

## 2025-07-16 PROCEDURE — 99285 EMERGENCY DEPT VISIT HI MDM: CPT

## 2025-07-16 ASSESSMENT — PAIN - FUNCTIONAL ASSESSMENT: PAIN_FUNCTIONAL_ASSESSMENT: NONE - DENIES PAIN

## 2025-07-16 NOTE — DISCHARGE INSTRUCTIONS
Please follow-up with your primary care provider and general surgery as scheduled.    Return if symptoms worsen or do not improve

## 2025-07-16 NOTE — ED TRIAGE NOTES
Pt presents to the ED through triage with c/c bilateral feet swelling post gall bladder surgery. Pt reports she was D/Cd from hospital on 7/12. Pt denies pain but states \"they feel like they're thumping\". Pt has strong bilateral pedal pulses. Vitals stable.

## 2025-07-16 NOTE — ED PROVIDER NOTES
[facility] EMERGENCY DEPARTMENT      EMERGENCY MEDICINE     Pt Name: Santiago Wiseman  MRN: [id number]  Birthdate 1960  Date of evaluation: 2025  Provider: Tmamy Hawk PA-C    CHIEF COMPLAINT       Chief Complaint   Patient presents with    Leg Swelling     HISTORY OF PRESENT ILLNESS   Santiago Wiseman is a pleasant 64 y.o. female who presents to the emergency department from from home, by private vehicle for evaluation of lower leg edema.  Patient states she had a cholecystectomy and was discharged in the hospital on the .  She states she has bilateral calf pain as well as swelling and had concerns about DVT.  She denies history of heart failure or swelling of her legs before.  She denies shortness of breath or chest pain.  She is neurovascularly intact of bilateral extremities    PASTMEDICAL HISTORY     Past Medical History:   Diagnosis Date    Anxiety     Asthma     Bipolar disorder (HCC)     Blood clotting disorder 2014    was on coumadin until 2014    Breast cyst 7-8 yrs ago    rt    Depression     Duodenal ulcer 2019    GERD (gastroesophageal reflux disease)     Hyperlipidemia     Hypertension     Schizophrenia (HCC)        Patient Active Problem List   Diagnosis Code    Alcohol abuse F10.10    Hypertension I10    Hyperlipidemia E78.5    GERD (gastroesophageal reflux disease) K21.9    Adenomatous colon polyp D12.6    Intermittent palpitations R00.2    Tobacco abuse Z72.0    Bipolar disorder, current episode mixed, moderate (HCC) F31.62    Macrocytic anemia D53.9    Hyperbilirubinemia E80.6    Acute cholecystitis K81.0     SURGICAL HISTORY       Past Surgical History:   Procedure Laterality Date    BREAST SURGERY  10 yrs    lump right     SECTION      x 1    CHOLECYSTECTOMY, LAPAROSCOPIC N/A 7/10/2025    Robot laparoscopic cholecystectomy performed by Alpesh Staton MD at [facility] OR    ELBOW SURGERY Left 2013    Deep hardware removal with ulnar nerve

## 2025-08-06 ENCOUNTER — OFFICE VISIT (OUTPATIENT)
Dept: SURGERY | Age: 65
End: 2025-08-06

## 2025-08-06 VITALS
BODY MASS INDEX: 21.54 KG/M2 | RESPIRATION RATE: 18 BRPM | OXYGEN SATURATION: 97 % | TEMPERATURE: 97.5 F | DIASTOLIC BLOOD PRESSURE: 68 MMHG | HEIGHT: 61 IN | SYSTOLIC BLOOD PRESSURE: 128 MMHG | HEART RATE: 93 BPM

## 2025-08-06 DIAGNOSIS — Z90.49 S/P LAPAROSCOPIC CHOLECYSTECTOMY: Primary | ICD-10-CM

## 2025-08-06 PROCEDURE — 99024 POSTOP FOLLOW-UP VISIT: CPT | Performed by: NURSE PRACTITIONER

## (undated) DEVICE — TUBING INSUFFLATOR HEAT HUMIDIFIED SMK EVAC SET PNEUMOCLEAR

## (undated) DEVICE — SUTURE VICRYL + SZ 0 L27IN ABSRB VLT L26MM UR-6 5/8 CIR VCP603H

## (undated) DEVICE — BASIC SINGLE BASIN BTC-LF: Brand: MEDLINE INDUSTRIES, INC.

## (undated) DEVICE — INSUFFLATION NEEDLE TO ESTABLISH PNEUMOPERITONEUM.: Brand: INSUFFLATION NEEDLE

## (undated) DEVICE — BAG SPEC REM 224ML W4XL6IN DIA10MM 1 HND GYN DISP ENDOPCH

## (undated) DEVICE — ELECTRO LUBE IS A SINGLE PATIENT USE DEVICE THAT IS INTENDED TO BE USED ON ELECTROSURGICAL ELECTRODES TO REDUCE STICKING.: Brand: KEY SURGICAL ELECTRO LUBE

## (undated) DEVICE — GLOVE ORANGE PI 8   MSG9080

## (undated) DEVICE — SEAL

## (undated) DEVICE — KIT DETRGNT ENZYMATIC SOLUTION 100 ML SOAK SHLD 5 VI LG HUMD

## (undated) DEVICE — TROCAR: Brand: KII FIOS FIRST ENTRY

## (undated) DEVICE — COLUMN DRAPE

## (undated) DEVICE — BLADELESS OBTURATOR: Brand: WECK VISTA

## (undated) DEVICE — REDUCER: Brand: ENDOWRIST

## (undated) DEVICE — GLOVE ORANGE PI 7   MSG9070

## (undated) DEVICE — GOWN,SIRUS,NON REINFRCD,LARGE,SET IN SL: Brand: MEDLINE

## (undated) DEVICE — LIQUIBAND RAPID ADHESIVE 36/CS 0.8ML: Brand: MEDLINE

## (undated) DEVICE — GENERAL LAPAROSCOPY-LF: Brand: MEDLINE INDUSTRIES, INC.

## (undated) DEVICE — ARM DRAPE